# Patient Record
Sex: MALE | Race: WHITE | NOT HISPANIC OR LATINO | Employment: OTHER | URBAN - METROPOLITAN AREA
[De-identification: names, ages, dates, MRNs, and addresses within clinical notes are randomized per-mention and may not be internally consistent; named-entity substitution may affect disease eponyms.]

---

## 2017-02-16 RX ORDER — TESTOSTERONE CYPIONATE 200 MG/ML
INJECTION, SOLUTION INTRAMUSCULAR
Qty: 10 ML | Refills: 0 | OUTPATIENT
Start: 2017-02-16 | End: 2017-06-01 | Stop reason: SDUPTHER

## 2017-03-06 RX ORDER — LEVOTHYROXINE SODIUM 0.2 MG/1
TABLET ORAL
Qty: 90 TABLET | Refills: 0 | Status: SHIPPED | OUTPATIENT
Start: 2017-03-06 | End: 2017-06-01 | Stop reason: SDUPTHER

## 2017-03-14 ENCOUNTER — TELEPHONE (OUTPATIENT)
Dept: ENDOCRINOLOGY | Age: 67
End: 2017-03-14

## 2017-03-14 RX ORDER — DILTIAZEM HYDROCHLORIDE 180 MG/1
180 CAPSULE, COATED, EXTENDED RELEASE ORAL DAILY
Qty: 90 CAPSULE | Refills: 1 | Status: SHIPPED | OUTPATIENT
Start: 2017-03-14 | End: 2017-04-06

## 2017-03-14 RX ORDER — DILTIAZEM HYDROCHLORIDE 180 MG/1
180 CAPSULE, COATED, EXTENDED RELEASE ORAL DAILY
Qty: 90 CAPSULE | Refills: 1 | Status: SHIPPED | OUTPATIENT
Start: 2017-03-14 | End: 2017-03-14 | Stop reason: SDUPTHER

## 2017-03-14 RX ORDER — FENOFIBRIC ACID 135 MG/1
135 CAPSULE, DELAYED RELEASE ORAL DAILY
Qty: 90 CAPSULE | Refills: 1 | Status: SHIPPED | OUTPATIENT
Start: 2017-03-14 | End: 2017-04-06

## 2017-03-14 NOTE — TELEPHONE ENCOUNTER
----- Message from Monica Dale sent at 3/14/2017  9:40 AM EDT -----  Contact: patient  Patient needs a refill of  fenofibric acid (TRILIPIX) 135 MG capsule, one tablet once per day, 90 day supply  diltiaZEM CD (CARDIZEM CD) 180 MG 24 hr capsule, one tablet once per day, 90 day supply  Send to [facility]  [phone] (Phone)  [phone] (Fax)      FENOFIBRIC ACID REFILL SENT TO PHARMACY.  REQUEST FOR DILTIAZEM WAS SENT TO DR. DURHAM

## 2017-03-17 ENCOUNTER — TELEPHONE (OUTPATIENT)
Dept: ENDOCRINOLOGY | Age: 67
End: 2017-03-17

## 2017-03-17 RX ORDER — ATORVASTATIN CALCIUM 40 MG/1
40 TABLET, FILM COATED ORAL DAILY
Qty: 90 TABLET | Refills: 1 | Status: SHIPPED | OUTPATIENT
Start: 2017-03-17 | End: 2017-09-25 | Stop reason: SDUPTHER

## 2017-03-17 NOTE — TELEPHONE ENCOUNTER
----- Message from Monica Dale sent at 3/17/2017 10:11 AM EDT -----  Contact: patient  Patient needs a refill of  atorvastatin (LIPITOR) 40 MG tablet, one tablet once per day, 90 day supply  Send to [facility]   [phone] (Phone)  [phone] (Fax)    SCRIPT SENT

## 2017-03-29 ENCOUNTER — TELEPHONE (OUTPATIENT)
Dept: ENDOCRINOLOGY | Age: 67
End: 2017-03-29

## 2017-03-29 DIAGNOSIS — IMO0002 UNCONTROLLED TYPE 2 DIABETES MELLITUS WITH COMPLICATION, WITHOUT LONG-TERM CURRENT USE OF INSULIN: ICD-10-CM

## 2017-03-29 NOTE — TELEPHONE ENCOUNTER
----- Message from Jennifer Blackburn sent at 3/28/2017  9:41 AM EDT -----  Contact: patient  [facility] [place], [place] - [address] [phone]  - [phone] (Phone)  [phone] (Fax)              VICTOZA 18 MG/3ML solution pen-injector 27 mL 1 9/2/2016      INJECT 1.8MG SUBCUTANEOUSLYDAILY           Script sent

## 2017-04-06 ENCOUNTER — OFFICE VISIT (OUTPATIENT)
Dept: CARDIOLOGY | Facility: CLINIC | Age: 67
End: 2017-04-06

## 2017-04-06 VITALS
BODY MASS INDEX: 29.54 KG/M2 | HEART RATE: 62 BPM | HEIGHT: 71 IN | DIASTOLIC BLOOD PRESSURE: 70 MMHG | SYSTOLIC BLOOD PRESSURE: 120 MMHG | WEIGHT: 211 LBS

## 2017-04-06 DIAGNOSIS — I73.9 PERIPHERAL VASCULAR DISEASE (HCC): ICD-10-CM

## 2017-04-06 DIAGNOSIS — I25.10 CHRONIC CORONARY ARTERY DISEASE: Primary | ICD-10-CM

## 2017-04-06 DIAGNOSIS — E78.5 HYPERLIPIDEMIA, UNSPECIFIED HYPERLIPIDEMIA TYPE: ICD-10-CM

## 2017-04-06 DIAGNOSIS — I10 ESSENTIAL HYPERTENSION: ICD-10-CM

## 2017-04-06 DIAGNOSIS — Z79.4 TYPE 2 DIABETES MELLITUS WITH OTHER CIRCULATORY COMPLICATION, WITH LONG-TERM CURRENT USE OF INSULIN (HCC): ICD-10-CM

## 2017-04-06 DIAGNOSIS — E11.59 TYPE 2 DIABETES MELLITUS WITH OTHER CIRCULATORY COMPLICATION, WITH LONG-TERM CURRENT USE OF INSULIN (HCC): ICD-10-CM

## 2017-04-06 PROCEDURE — 93000 ELECTROCARDIOGRAM COMPLETE: CPT | Performed by: INTERNAL MEDICINE

## 2017-04-06 PROCEDURE — 99214 OFFICE O/P EST MOD 30 MIN: CPT | Performed by: INTERNAL MEDICINE

## 2017-04-06 NOTE — PROGRESS NOTES
Date of Office Visit: 2017  Encounter Provider: Argentina Prieto MD  Place of Service: [facility]  Patient Name: Spencer Sinha  :1950      Patient ID:  Spencer Sinha is a 67 y.o. male is here for  followup for CAD.         History of Present Illness  He was admitted to [facility] on 2011 with syncope and  atrial fibrillation with rapid ventricular response. His atrial fibrillation  was due to thyrotoxicosis. He sees Dr. Brown for this. He converted  to sinus rhythm with medication, was stable and able to be discharged from  the hospital on propylthiouracil.      He had bilateral knee replacements in  with Dr. Mena. Prior to that  he had an abnormal stress Myoview study showing anterior infarct with  periinfarct ischemia. He went on to have a cardiac catheterization showing  severe left anterior descending artery disease, which was well  collateralized and he was treated medically as he did not have any active  angina.      He has a known history of peripheral arterial disease and had aortobifemoral  bypass done by Dr. Solo in . He is schedule to followup with him in one  month.      He has discoid lupus and follows with Dr. Foster.   He stopped smoking in .        He retired in 2014 and he is really enjoying it.       Mr. Sinha is here today for followup.  He has no chest pain or difficulty breathing.  He has had no tachycardia, dizziness or syncope.  His energy level is good.  I do not have a recent lipid panel for him.  He is getting that checked with Dr. Brown so I will get that from his office.  The patient has had no dizziness, heart racing or skipping.  He wonders if there are some medications he can come off of.  He is staying active and he brought his grandson, Hardik, with him today and he enjoys spending a lot of time with him.            Past Medical History:   Diagnosis Date   •  Arteriosclerotic coronary artery disease    • Atrial fibrillation    • Coronary artery disease    • Diabetes     type II, mellitus   • Diplopia    • Discoid lupus erythematosus    • Graves disease    • History of echocardiogram     L ATRIUM ENLARGED, anterior myocardial infarction, lft axis deviation, abnormal ECG   • History of electrocardiogram     SHOWS NORMAL SINUS RHYTHM, ANTERIOR MYOCARDIAL INFARCTION, OLD LDEFT AXIS DEVIATION, LEFT ANTERIOR FASCICULAR BLOCK, ABNORMAL ECG   • Hyperinsulinism    • Hypertension    • Hypertensive heart disease    • Hypogonadism, male    • Hypothyroidism, postablative    • IBS (irritable bowel syndrome)    • Left ventricular hypertrophy    • Long-term insulin use    • Metabolic disorder    • Myocardial infarction of inferolateral wall greater than eight weeks ago     of inferior wall, greater than 8 weeks   • Osteoarthritis    • Peripheral vascular disease    • Right bundle branch block with left anterior fascicular block    • Syncope    • Thyrotoxicosis factitia    • Type 2 diabetes mellitus          Past Surgical History:   Procedure Laterality Date   • BACK SURGERY     • BYPASS GRAFT      USING VEIN: AORTOFEMORAL   • OTHER SURGICAL HISTORY      CATHETER ABLATION   • REPLACEMENT TOTAL KNEE BILATERAL         Current Outpatient Prescriptions on File Prior to Visit   Medication Sig Dispense Refill   • ALPRAZolam (XANAX) 0.5 MG tablet Take 0.5 mg by mouth 2 (Two) Times a Day.     • aspirin 81 MG EC tablet Take 1 tablet by mouth daily.     • atorvastatin (LIPITOR) 40 MG tablet Take 1 tablet by mouth Daily. 90 tablet 1   • CALCIUM PO Take 1 tablet by mouth daily.     • carvedilol (COREG) 6.25 MG tablet TAKE ONE TABLET BY MOUTH TWICE A DAY WITH MEALS 180 tablet 0   • Cholecalciferol (VITAMIN D) 2000 UNITS capsule Take  by mouth.     • clopidogrel (PLAVIX) 75 MG tablet Take 1 tablet by mouth daily.     • diltiaZEM CD (CARDIZEM CD) 180 MG 24 hr capsule Take 1 capsule by mouth Daily. 90  "capsule 1   • Docusate Calcium (STOOL SOFTENER PO) Take 1 tablet by mouth 2 (two) times a day.     • DULoxetine (CYMBALTA) 30 MG capsule Take by mouth.     • DULoxetine (CYMBALTA) 60 MG capsule      • fenofibric acid (TRILIPIX) 135 MG capsule delayed-release delayed release capsule Take 1 capsule by mouth Daily. 90 capsule 1   • glimepiride (AMARYL) 2 MG tablet TAKE ONE TABLET BY MOUTH DAILY AS DIRECTED 90 tablet 1   • glucose blood (KIMBERLY CONTOUR TEST) test strip Kimberly Contour Test In Vitro Strip; Patient Sig: Kimberly Contour Test In Vitro Strip ; 100; 0; 06-May-2012; Active     • Insulin Pen Needle (NOVOFINE) 32G X 6 MM misc      • levothyroxine (SYNTHROID, LEVOTHROID) 200 MCG tablet TAKE ONE TABLET BY MOUTH DAILY 90 tablet 0   • Liraglutide (VICTOZA) 18 MG/3ML solution pen-injector Inject 1.8 mg under the skin Daily. 27 mL 1   • lisinopril-hydrochlorothiazide (PRINZIDE,ZESTORETIC) 20-25 MG per tablet TAKE ONE TABLET BY MOUTH DAILY 90 tablet 3   • Magnesium 100 MG capsule Take  by mouth.     • meloxicam (MOBIC) 15 MG tablet Take by mouth.     • metFORMIN (GLUCOPHAGE) 1000 MG tablet TAKE ONE TABLET BY MOUTH TWICE A  tablet 2   • Multiple Vitamin (MULTI-DAY VITAMINS PO) Take  by mouth daily.     • Syringe/Needle, Disp, (B-D SYRINGE/NEEDLE 3CC/22GX1.5) 22G X 1-1/2\" 3 ML misc      • Testosterone Cypionate (DEPOTESTOTERONE CYPIONATE) 200 MG/ML injection INJECT 0.75MLS INTO THE MUSCLE EVERY 10 DAYS 10 mL 0   • traZODone (DESYREL) 50 MG tablet Take 50 mg by mouth.     • Zinc 100 MG tablet Take 1 tablet by mouth daily.     • [DISCONTINUED] glycopyrrolate (ROBINUL) 1 MG tablet Take 1 mg by mouth 3 (Three) Times a Day.       No current facility-administered medications on file prior to visit.        Social History     Social History   • Marital status:      Spouse name: N/A   • Number of children: N/A   • Years of education: N/A     Occupational History   • Not on file.     Social History Main Topics   • Smoking " "status: Former Smoker   • Smokeless tobacco: Not on file   • Alcohol use No      Comment: caffeine use   • Drug use: Not on file   • Sexual activity: Not on file     Other Topics Concern   • Not on file     Social History Narrative           Review of Systems   Constitution: Negative.   HENT: Negative for congestion and headaches.    Eyes: Negative for vision loss in left eye and vision loss in right eye.   Respiratory: Negative.  Negative for cough, hemoptysis, shortness of breath, sleep disturbances due to breathing, snoring, sputum production and wheezing.    Endocrine: Negative.    Hematologic/Lymphatic: Negative.    Skin: Negative for poor wound healing and rash.   Musculoskeletal: Negative for falls, gout, muscle cramps and myalgias.   Gastrointestinal: Negative for abdominal pain, diarrhea, dysphagia, hematemesis, melena, nausea and vomiting.   Neurological: Negative for excessive daytime sleepiness, dizziness, light-headedness, loss of balance, seizures and vertigo.   Psychiatric/Behavioral: Negative for depression and substance abuse. The patient is not nervous/anxious.        Procedures    ECG 12 Lead  Date/Time: 4/6/2017 1:06 PM  Performed by: SONJA DURHAM  Authorized by: SONJA DURHAM   Comparison: compared with previous ECG   Similar to previous ECG  Rhythm: sinus rhythm  QRS axis: left  Other findings: PRWP  Clinical impression: non-specific ECG               Objective:      Vitals:    04/06/17 1254   BP: 120/70   BP Location: Right arm   Patient Position: Sitting   Pulse: 62   Weight: 211 lb (95.7 kg)   Height: 71\" (180.3 cm)     Body mass index is 29.43 kg/(m^2).    Physical Exam   Constitutional: He is oriented to person, place, and time. He appears well-developed and well-nourished. No distress.   HENT:   Head: Normocephalic and atraumatic.   Eyes: Conjunctivae are normal. No scleral icterus.   Neck: Neck supple. No JVD present. Carotid bruit is not present. No thyromegaly present. "   Cardiovascular: Normal rate, regular rhythm, S1 normal, S2 normal, normal heart sounds and intact distal pulses.   No extrasystoles are present. PMI is not displaced.  Exam reveals no gallop.    No murmur heard.  Pulses:       Carotid pulses are 2+ on the right side, and 2+ on the left side.       Radial pulses are 2+ on the right side, and 2+ on the left side.        Dorsalis pedis pulses are 2+ on the right side, and 2+ on the left side.        Posterior tibial pulses are 2+ on the right side, and 2+ on the left side.   Pulmonary/Chest: Effort normal and breath sounds normal. No respiratory distress. He has no wheezes. He has no rhonchi. He has no rales. He exhibits no tenderness.   Abdominal: Soft. Bowel sounds are normal. He exhibits no distension, no abdominal bruit and no mass. There is no tenderness.   Musculoskeletal: He exhibits no edema or deformity.   Lymphadenopathy:     He has no cervical adenopathy.   Neurological: He is alert and oriented to person, place, and time. No cranial nerve deficit.   Skin: Skin is warm and dry. No rash noted. He is not diaphoretic. No cyanosis. No pallor. Nails show no clubbing.   Psychiatric: He has a normal mood and affect. Judgment normal.   Vitals reviewed.      Lab Review:       Assessment:      Diagnosis Plan   1. Chronic coronary artery disease     2. Hyperlipidemia, unspecified hyperlipidemia type     3. Essential hypertension     4. Peripheral vascular disease     5. Type 2 diabetes mellitus with other circulatory complication, with long-term current use of insulin       1. History of atrial fibrillation due to thyroid toxicosis. No recurrence of atrial fibrillation.  2. Hypertension, under good control. Has history of left ventricular hypertrophy.  3. Hyperlipidemia, per Dr. Brown.   4. Diabetes mellitus type 2, per Dr. Brown. Well controlled.  5. Peripheral arterial disease status post bilateral aorta with bifemoral  bypasses. Follows with   Jarod who he sees next month.  6. Tobacco use. He stopped smoking in 2015.   7. Coronary disease with history of severe left anterior descending artery  stenosis. No active angina or heart failure.   8. Abnormal ECG with left anterior fascicular block.  9. Osteoarthritis.   10. Stress with depression, under better control.      Plan:       Stop fenofibrate and diltiazem.  See back in 1 year, call in bp and hr results in 1 month.    Coronary Artery Disease  Assessment  • The patient has no angina    Subjective - Objective  • Current antiplatelet therapy includes aspirin 81 mg

## 2017-04-13 RX ORDER — LISINOPRIL AND HYDROCHLOROTHIAZIDE 25; 20 MG/1; MG/1
TABLET ORAL
Qty: 90 TABLET | Refills: 2 | Status: CANCELLED | OUTPATIENT
Start: 2017-04-13

## 2017-04-14 RX ORDER — LISINOPRIL AND HYDROCHLOROTHIAZIDE 25; 20 MG/1; MG/1
1 TABLET ORAL DAILY
Qty: 90 TABLET | Refills: 3 | Status: SHIPPED | OUTPATIENT
Start: 2017-04-14 | End: 2018-04-10 | Stop reason: ALTCHOICE

## 2017-04-21 ENCOUNTER — LAB (OUTPATIENT)
Dept: ENDOCRINOLOGY | Age: 67
End: 2017-04-21

## 2017-04-21 DIAGNOSIS — E89.0 POSTABLATIVE HYPOTHYROIDISM: ICD-10-CM

## 2017-04-21 DIAGNOSIS — E29.1 HYPOGONADISM IN MALE: ICD-10-CM

## 2017-04-21 DIAGNOSIS — IMO0002 UNCONTROLLED TYPE 2 DIABETES MELLITUS WITH COMPLICATION, WITH LONG-TERM CURRENT USE OF INSULIN: Primary | ICD-10-CM

## 2017-04-21 DIAGNOSIS — IMO0002 UNCONTROLLED TYPE 2 DIABETES MELLITUS WITH COMPLICATION, WITH LONG-TERM CURRENT USE OF INSULIN: ICD-10-CM

## 2017-04-23 LAB
ALBUMIN SERPL-MCNC: 4.5 G/DL (ref 3.5–5.2)
ALBUMIN/CREAT UR: 4.4 MG/G CREAT (ref 0–30)
ALBUMIN/GLOB SERPL: 1.7 G/DL
ALP SERPL-CCNC: 55 U/L (ref 39–117)
ALT SERPL-CCNC: 43 U/L (ref 1–41)
AST SERPL-CCNC: 26 U/L (ref 1–40)
BILIRUB SERPL-MCNC: 0.4 MG/DL (ref 0.1–1.2)
BUN SERPL-MCNC: 20 MG/DL (ref 8–23)
BUN/CREAT SERPL: 15.7 (ref 7–25)
CALCIUM SERPL-MCNC: 10.4 MG/DL (ref 8.6–10.5)
CHLORIDE SERPL-SCNC: 101 MMOL/L (ref 98–107)
CO2 SERPL-SCNC: 24.7 MMOL/L (ref 22–29)
CONV COMMENT: ABNORMAL
CREAT SERPL-MCNC: 1.27 MG/DL (ref 0.76–1.27)
CREAT UR-MCNC: 129.9 MG/DL
GLOBULIN SER CALC-MCNC: 2.6 GM/DL
GLUCOSE SERPL-MCNC: 170 MG/DL (ref 65–99)
HBA1C MFR BLD: 7.2 % (ref 4.8–5.6)
MICROALBUMIN UR-MCNC: 5.7 UG/ML
POTASSIUM SERPL-SCNC: 4.8 MMOL/L (ref 3.5–5.2)
PROT SERPL-MCNC: 7.1 G/DL (ref 6–8.5)
SHBG SERPL-SCNC: 35.2 NMOL/L (ref 19.3–76.4)
SODIUM SERPL-SCNC: 143 MMOL/L (ref 136–145)
T4 FREE SERPL-MCNC: 1.75 NG/DL (ref 0.93–1.7)
TESTOST FREE SERPL-MCNC: 8.5 PG/ML (ref 6.6–18.1)
TESTOST SERPL-MCNC: 207 NG/DL (ref 348–1197)
TSH SERPL DL<=0.005 MIU/L-ACNC: 1.4 MIU/ML (ref 0.27–4.2)

## 2017-04-28 ENCOUNTER — OFFICE VISIT (OUTPATIENT)
Dept: ENDOCRINOLOGY | Age: 67
End: 2017-04-28

## 2017-04-28 VITALS
SYSTOLIC BLOOD PRESSURE: 110 MMHG | BODY MASS INDEX: 29.09 KG/M2 | HEIGHT: 71 IN | WEIGHT: 207.8 LBS | HEART RATE: 74 BPM | DIASTOLIC BLOOD PRESSURE: 66 MMHG | OXYGEN SATURATION: 98 %

## 2017-04-28 DIAGNOSIS — E16.1 HYPERINSULINISM: ICD-10-CM

## 2017-04-28 DIAGNOSIS — E29.1 HYPOGONADISM IN MALE: ICD-10-CM

## 2017-04-28 DIAGNOSIS — E89.0 POSTABLATIVE HYPOTHYROIDISM: ICD-10-CM

## 2017-04-28 DIAGNOSIS — IMO0002 UNCONTROLLED TYPE 2 DIABETES MELLITUS WITH COMPLICATION, WITHOUT LONG-TERM CURRENT USE OF INSULIN: ICD-10-CM

## 2017-04-28 DIAGNOSIS — E05.00 GRAVES DISEASE: ICD-10-CM

## 2017-04-28 DIAGNOSIS — IMO0002 UNCONTROLLED TYPE 2 DIABETES MELLITUS WITH COMPLICATION, WITH LONG-TERM CURRENT USE OF INSULIN: Primary | ICD-10-CM

## 2017-04-28 PROCEDURE — 99214 OFFICE O/P EST MOD 30 MIN: CPT | Performed by: INTERNAL MEDICINE

## 2017-04-30 LAB
CHOLEST SERPL-MCNC: 149 MG/DL (ref 0–200)
CONV COMMENT: NORMAL
HDLC SERPL-MCNC: 39 MG/DL (ref 40–60)
LDLC SERPL CALC-MCNC: 71 MG/DL (ref 0–100)
TESTOST FREE SERPL-MCNC: 17.3 PG/ML (ref 6.6–18.1)
TESTOST SERPL-MCNC: 707 NG/DL (ref 348–1197)
TRIGL SERPL-MCNC: 195 MG/DL (ref 0–150)
VLDLC SERPL CALC-MCNC: 39 MG/DL (ref 5–40)

## 2017-05-02 ENCOUNTER — TELEPHONE (OUTPATIENT)
Dept: CARDIOLOGY | Facility: CLINIC | Age: 67
End: 2017-05-02

## 2017-05-26 ENCOUNTER — TELEPHONE (OUTPATIENT)
Dept: ENDOCRINOLOGY | Age: 67
End: 2017-05-26

## 2017-06-01 ENCOUNTER — TELEPHONE (OUTPATIENT)
Dept: ENDOCRINOLOGY | Age: 67
End: 2017-06-01

## 2017-06-01 RX ORDER — TESTOSTERONE CYPIONATE 200 MG/ML
INJECTION, SOLUTION INTRAMUSCULAR
Qty: 10 ML | Refills: 0 | OUTPATIENT
Start: 2017-06-01 | End: 2018-03-01 | Stop reason: SDUPTHER

## 2017-06-01 NOTE — TELEPHONE ENCOUNTER
----- Message from Francesca Wilkerson sent at 5/31/2017  3:36 PM EDT -----  Contact: PATIENT  THE PATIENT HAS SENT 2 MESSAGES ABOUT NEEDING HIS TESTOSTERONE REFILLED AND I HAD SPOKEN WITH HIM ABOUT HIS ON 5/24/17. I SEE IN THE CHART WHERE YOU HAD SENT THE PA TO [facility] ON 5/26/17 AND YOU SAID WE ARE JUST WAITING FOR A DECISION FROM THEM. THE PATIENT HEARD THAT EVENING FROM [facility] THAT HIS TESTOSTERONE WOULD BE COVERED AND IS ASKING FOR THIS SCRIPT TO BE SENT TO [facility]; NOT HIS [facility]. PLEASE SEND THIS SCRIPT TO:  [facility] [place], [place] - [address]  [phone] (Phone)  [phone] (Fax)          SCRIPT CALLED IN

## 2017-06-02 RX ORDER — LEVOTHYROXINE SODIUM 0.2 MG/1
TABLET ORAL
Qty: 90 TABLET | Refills: 0 | Status: SHIPPED | OUTPATIENT
Start: 2017-06-02 | End: 2017-07-27 | Stop reason: SDUPTHER

## 2017-06-02 RX ORDER — GLIMEPIRIDE 2 MG/1
TABLET ORAL
Qty: 90 TABLET | Refills: 0 | Status: SHIPPED | OUTPATIENT
Start: 2017-06-02 | End: 2017-08-23 | Stop reason: SDUPTHER

## 2017-06-15 ENCOUNTER — OFFICE (AMBULATORY)
Dept: URBAN - METROPOLITAN AREA CLINIC 75 | Facility: CLINIC | Age: 67
End: 2017-06-15

## 2017-06-15 VITALS
HEART RATE: 68 BPM | WEIGHT: 200 LBS | SYSTOLIC BLOOD PRESSURE: 118 MMHG | HEIGHT: 78 IN | DIASTOLIC BLOOD PRESSURE: 62 MMHG

## 2017-06-15 DIAGNOSIS — K59.1 FUNCTIONAL DIARRHEA: ICD-10-CM

## 2017-06-15 PROCEDURE — 99204 OFFICE O/P NEW MOD 45 MIN: CPT | Performed by: INTERNAL MEDICINE

## 2017-06-15 RX ORDER — DICYCLOMINE HYDROCHLORIDE 10 MG/1
CAPSULE ORAL
Qty: 60 | Refills: 6 | Status: COMPLETED
Start: 2017-06-15 | End: 2017-07-25

## 2017-06-16 ENCOUNTER — OFFICE (AMBULATORY)
Dept: URBAN - METROPOLITAN AREA LAB 2 | Facility: LAB | Age: 67
End: 2017-06-16
Payer: MEDICARE

## 2017-06-16 DIAGNOSIS — K59.1 FUNCTIONAL DIARRHEA: ICD-10-CM

## 2017-06-16 PROCEDURE — 87999 UNLISTED MICROBIOLOGY PX: CPT | Performed by: INTERNAL MEDICINE

## 2017-06-23 RX ORDER — CARVEDILOL 6.25 MG/1
TABLET ORAL
Qty: 180 TABLET | Refills: 3 | Status: SHIPPED | OUTPATIENT
Start: 2017-06-23 | End: 2018-06-23 | Stop reason: SDUPTHER

## 2017-07-13 ENCOUNTER — LAB (OUTPATIENT)
Dept: ENDOCRINOLOGY | Age: 67
End: 2017-07-13

## 2017-07-13 DIAGNOSIS — IMO0002 UNCONTROLLED TYPE 2 DIABETES MELLITUS WITH COMPLICATION, WITH LONG-TERM CURRENT USE OF INSULIN: Primary | ICD-10-CM

## 2017-07-13 DIAGNOSIS — E89.0 POSTABLATIVE HYPOTHYROIDISM: ICD-10-CM

## 2017-07-13 DIAGNOSIS — E29.1 HYPOGONADISM IN MALE: ICD-10-CM

## 2017-07-13 DIAGNOSIS — E78.5 HYPERLIPIDEMIA, UNSPECIFIED HYPERLIPIDEMIA TYPE: ICD-10-CM

## 2017-07-13 DIAGNOSIS — IMO0002 UNCONTROLLED TYPE 2 DIABETES MELLITUS WITH COMPLICATION, WITH LONG-TERM CURRENT USE OF INSULIN: ICD-10-CM

## 2017-07-15 LAB
ALBUMIN SERPL-MCNC: 4.6 G/DL (ref 3.5–5.2)
ALBUMIN/CREAT UR: <4 MG/G CREAT (ref 0–30)
ALBUMIN/GLOB SERPL: 1.5 G/DL
ALP SERPL-CCNC: 69 U/L (ref 39–117)
ALT SERPL-CCNC: 27 U/L (ref 1–41)
AST SERPL-CCNC: 22 U/L (ref 1–40)
BILIRUB SERPL-MCNC: 0.4 MG/DL (ref 0.1–1.2)
BUN SERPL-MCNC: 22 MG/DL (ref 8–23)
BUN/CREAT SERPL: 18.6 (ref 7–25)
CALCIUM SERPL-MCNC: 10.8 MG/DL (ref 8.6–10.5)
CHLORIDE SERPL-SCNC: 95 MMOL/L (ref 98–107)
CHOLEST SERPL-MCNC: 140 MG/DL (ref 0–200)
CO2 SERPL-SCNC: 28.8 MMOL/L (ref 22–29)
CONV COMMENT: ABNORMAL
CREAT SERPL-MCNC: 1.18 MG/DL (ref 0.76–1.27)
CREAT UR-MCNC: 75.4 MG/DL
GLOBULIN SER CALC-MCNC: 3 GM/DL
GLUCOSE SERPL-MCNC: 130 MG/DL (ref 65–99)
HBA1C MFR BLD: 6.6 % (ref 4.8–5.6)
HDLC SERPL-MCNC: 37 MG/DL (ref 40–60)
LDLC SERPL CALC-MCNC: 68 MG/DL (ref 0–100)
MICROALBUMIN UR-MCNC: <3 UG/ML
POTASSIUM SERPL-SCNC: 4.4 MMOL/L (ref 3.5–5.2)
PROT SERPL-MCNC: 7.6 G/DL (ref 6–8.5)
SHBG SERPL-SCNC: 41.3 NMOL/L (ref 19.3–76.4)
SODIUM SERPL-SCNC: 139 MMOL/L (ref 136–145)
T4 FREE SERPL-MCNC: 1.79 NG/DL (ref 0.93–1.7)
TESTOST FREE SERPL-MCNC: 5.5 PG/ML (ref 6.6–18.1)
TESTOST SERPL-MCNC: 130 NG/DL (ref 348–1197)
TRIGL SERPL-MCNC: 173 MG/DL (ref 0–150)
TSH SERPL DL<=0.005 MIU/L-ACNC: 0.92 MIU/ML (ref 0.27–4.2)
VLDLC SERPL CALC-MCNC: 34.6 MG/DL (ref 5–40)

## 2017-07-24 VITALS
RESPIRATION RATE: 17 BRPM | HEART RATE: 70 BPM | TEMPERATURE: 97.3 F | OXYGEN SATURATION: 95 % | DIASTOLIC BLOOD PRESSURE: 64 MMHG | SYSTOLIC BLOOD PRESSURE: 128 MMHG | OXYGEN SATURATION: 96 % | HEIGHT: 71 IN | RESPIRATION RATE: 21 BRPM | SYSTOLIC BLOOD PRESSURE: 101 MMHG | OXYGEN SATURATION: 98 % | SYSTOLIC BLOOD PRESSURE: 103 MMHG | OXYGEN SATURATION: 99 % | RESPIRATION RATE: 14 BRPM | RESPIRATION RATE: 18 BRPM | SYSTOLIC BLOOD PRESSURE: 106 MMHG | HEART RATE: 68 BPM | SYSTOLIC BLOOD PRESSURE: 105 MMHG | SYSTOLIC BLOOD PRESSURE: 109 MMHG | HEART RATE: 69 BPM | SYSTOLIC BLOOD PRESSURE: 111 MMHG | DIASTOLIC BLOOD PRESSURE: 62 MMHG | DIASTOLIC BLOOD PRESSURE: 66 MMHG | DIASTOLIC BLOOD PRESSURE: 88 MMHG | SYSTOLIC BLOOD PRESSURE: 126 MMHG | DIASTOLIC BLOOD PRESSURE: 92 MMHG | HEART RATE: 67 BPM | HEART RATE: 76 BPM | OXYGEN SATURATION: 97 % | HEART RATE: 66 BPM | DIASTOLIC BLOOD PRESSURE: 65 MMHG | HEART RATE: 78 BPM | TEMPERATURE: 97 F | SYSTOLIC BLOOD PRESSURE: 110 MMHG | DIASTOLIC BLOOD PRESSURE: 61 MMHG | SYSTOLIC BLOOD PRESSURE: 129 MMHG | WEIGHT: 200 LBS | DIASTOLIC BLOOD PRESSURE: 76 MMHG | RESPIRATION RATE: 16 BRPM

## 2017-07-25 ENCOUNTER — AMBULATORY SURGICAL CENTER (AMBULATORY)
Dept: URBAN - METROPOLITAN AREA SURGERY 17 | Facility: SURGERY | Age: 67
End: 2017-07-25

## 2017-07-25 ENCOUNTER — OFFICE (AMBULATORY)
Dept: URBAN - METROPOLITAN AREA CLINIC 64 | Facility: CLINIC | Age: 67
End: 2017-07-25

## 2017-07-25 DIAGNOSIS — K59.1 FUNCTIONAL DIARRHEA: ICD-10-CM

## 2017-07-25 DIAGNOSIS — K64.8 OTHER HEMORRHOIDS: ICD-10-CM

## 2017-07-25 LAB
GI HISTOLOGY: A. UNSPECIFIED: (no result)
GI HISTOLOGY: B. UNSPECIFIED: (no result)
GI HISTOLOGY: C. UNSPECIFIED: (no result)
GI HISTOLOGY: D. UNSPECIFIED: (no result)
GI HISTOLOGY: PDF REPORT: (no result)

## 2017-07-25 PROCEDURE — 45380 COLONOSCOPY AND BIOPSY: CPT | Performed by: INTERNAL MEDICINE

## 2017-07-25 PROCEDURE — 88305 TISSUE EXAM BY PATHOLOGIST: CPT | Performed by: INTERNAL MEDICINE

## 2017-07-25 RX ADMIN — PROPOFOL 50 MG: 10 INJECTION, EMULSION INTRAVENOUS at 09:30

## 2017-07-25 RX ADMIN — PROPOFOL 50 MG: 10 INJECTION, EMULSION INTRAVENOUS at 09:34

## 2017-07-25 RX ADMIN — PROPOFOL 50 MG: 10 INJECTION, EMULSION INTRAVENOUS at 09:24

## 2017-07-25 RX ADMIN — PROPOFOL 100 MG: 10 INJECTION, EMULSION INTRAVENOUS at 09:20

## 2017-07-27 ENCOUNTER — OFFICE VISIT (OUTPATIENT)
Dept: ENDOCRINOLOGY | Age: 67
End: 2017-07-27

## 2017-07-27 VITALS
SYSTOLIC BLOOD PRESSURE: 112 MMHG | OXYGEN SATURATION: 95 % | DIASTOLIC BLOOD PRESSURE: 72 MMHG | HEART RATE: 79 BPM | WEIGHT: 197.4 LBS | HEIGHT: 71 IN | BODY MASS INDEX: 27.64 KG/M2

## 2017-07-27 DIAGNOSIS — IMO0002 UNCONTROLLED TYPE 2 DIABETES MELLITUS WITH COMPLICATION, WITHOUT LONG-TERM CURRENT USE OF INSULIN: Primary | ICD-10-CM

## 2017-07-27 DIAGNOSIS — E29.1 HYPOGONADISM IN MALE: ICD-10-CM

## 2017-07-27 DIAGNOSIS — E05.00 GRAVES DISEASE: ICD-10-CM

## 2017-07-27 DIAGNOSIS — E16.1 HYPERINSULINISM: ICD-10-CM

## 2017-07-27 DIAGNOSIS — E89.0 POSTABLATIVE HYPOTHYROIDISM: ICD-10-CM

## 2017-07-27 PROCEDURE — 99214 OFFICE O/P EST MOD 30 MIN: CPT | Performed by: INTERNAL MEDICINE

## 2017-07-27 RX ORDER — METHSCOPOLAMINE BROMIDE 5 MG/1
TABLET ORAL
COMMUNITY
Start: 2017-07-21 | End: 2018-03-30

## 2017-07-27 RX ORDER — LEVOTHYROXINE SODIUM 175 UG/1
175 TABLET ORAL DAILY
Qty: 90 TABLET | Refills: 1 | Status: SHIPPED | OUTPATIENT
Start: 2017-07-27 | End: 2018-01-25 | Stop reason: SDUPTHER

## 2017-08-24 RX ORDER — GLIMEPIRIDE 2 MG/1
TABLET ORAL
Qty: 90 TABLET | Refills: 1 | Status: SHIPPED | OUTPATIENT
Start: 2017-08-24 | End: 2018-03-01 | Stop reason: SDUPTHER

## 2017-09-25 RX ORDER — ATORVASTATIN CALCIUM 40 MG/1
40 TABLET, FILM COATED ORAL DAILY
Qty: 90 TABLET | Refills: 1 | Status: SHIPPED | OUTPATIENT
Start: 2017-09-25 | End: 2018-03-27 | Stop reason: SDUPTHER

## 2017-09-26 ENCOUNTER — TELEPHONE (OUTPATIENT)
Dept: CARDIOLOGY | Facility: CLINIC | Age: 67
End: 2017-09-26

## 2017-09-26 NOTE — TELEPHONE ENCOUNTER
----- Message from Spencer Sinha sent at 9/26/2017  9:21 AM EDT -----  Regarding: Prescription Question  Contact: [phone]  Background:   In last 10 weeks I have changed diet to Lactose free & lost 20 pounds.  I am light headed when I rise from lying down or sitting.  I have passed out 5 times since July (out for a few seconds). No other symptoms.  BP has been running lower. Range now 106/64. 109/63 at DrMark office yesterday.  RX today: 1 Carvedilol (Coreg) AM & PM                  1 Lisinopril HCTZ (Zestoric) AM  Should I cut back on anything & monitor?

## 2017-10-12 ENCOUNTER — OFFICE (AMBULATORY)
Dept: URBAN - METROPOLITAN AREA CLINIC 75 | Facility: CLINIC | Age: 67
End: 2017-10-12

## 2017-10-12 VITALS
DIASTOLIC BLOOD PRESSURE: 68 MMHG | HEART RATE: 80 BPM | SYSTOLIC BLOOD PRESSURE: 102 MMHG | WEIGHT: 194 LBS | HEIGHT: 71 IN

## 2017-10-12 DIAGNOSIS — K30 FUNCTIONAL DYSPEPSIA: ICD-10-CM

## 2017-10-12 DIAGNOSIS — K59.1 FUNCTIONAL DIARRHEA: ICD-10-CM

## 2017-10-12 DIAGNOSIS — K64.8 OTHER HEMORRHOIDS: ICD-10-CM

## 2017-10-12 DIAGNOSIS — R14.0 ABDOMINAL DISTENSION (GASEOUS): ICD-10-CM

## 2017-10-12 PROCEDURE — 99214 OFFICE O/P EST MOD 30 MIN: CPT | Performed by: INTERNAL MEDICINE

## 2017-10-17 DIAGNOSIS — IMO0002 UNCONTROLLED TYPE 2 DIABETES MELLITUS WITH COMPLICATION, WITHOUT LONG-TERM CURRENT USE OF INSULIN: ICD-10-CM

## 2017-10-17 RX ORDER — LIRAGLUTIDE 6 MG/ML
INJECTION SUBCUTANEOUS
Qty: 27 ML | Refills: 1 | Status: SHIPPED | OUTPATIENT
Start: 2017-10-17 | End: 2018-01-22 | Stop reason: SDUPTHER

## 2017-10-17 RX ORDER — ATORVASTATIN CALCIUM 40 MG/1
TABLET, FILM COATED ORAL
Qty: 90 TABLET | Refills: 1 | Status: SHIPPED | OUTPATIENT
Start: 2017-10-17 | End: 2018-03-30 | Stop reason: SDUPTHER

## 2017-11-10 DIAGNOSIS — E89.0 POSTABLATIVE HYPOTHYROIDISM: Primary | ICD-10-CM

## 2017-11-30 ENCOUNTER — OFFICE VISIT (OUTPATIENT)
Dept: ENDOCRINOLOGY | Age: 67
End: 2017-11-30

## 2017-11-30 DIAGNOSIS — E89.0 POSTABLATIVE HYPOTHYROIDISM: ICD-10-CM

## 2017-11-30 PROCEDURE — 76536 US EXAM OF HEAD AND NECK: CPT | Performed by: INTERNAL MEDICINE

## 2017-12-09 NOTE — PROGRESS NOTES
[facility]                                                                                                                                       Ultrasound Thyroid/ Soft tissue Neck Report    [id number]  Spencer LUIS Sinha    1950    [facility] MD:      11/30/2017    INDICATION: Nodular goiter, Graves' disease status post radioiodine ablation    Explanation of Procedure::    Using physician - performed, real-time ultrasound, limited to the thyroid gland and the anterior neck , longitudinal and transverse images were obtained of both lobes and isthmus and power doppler was used to assess the vascularity and the findings are as follows:    Comparison to previous study  No previous ultrasound available for comparison    Thyroid size and Measurements: All measurements are expressed as longitudinal X AP X  transverse unless otherwise specified    Right Lobe: Measures 4.94 cm x 1.79 cm x 2.39 cm    Left lobe: Measures 3.57 cm x 1.5 cm x 1.37 cm    Isthmus: Measures 0.17 cm    Thyroid Parenchyma:    Overall thyroid gland echotexture appears to be predominantly isoechoic with heterogeneous appearance and multiple nodules spread throughout the gland.  The nodule in the right lobe which appears to be 2.1 cm x 1.6 cm predominantly isoechoic with no calcifications noted  test again to sick features noted for this nodule.  Vascularity appears to be minimal  There is also a nodule in the left lobe of 1.89 cm x 1.05 cm which is rather hypoechoic with a diminished vascularity  Patient also has several small nodules bilaterally  General vascularity of the entire gland appears to be power Doppler grade 1        Impression  Multinodular goiter with heterogeneous echotexture suggestive of thyroiditis  Large right thyroid nodule measuring 2.1 cm x 1.6 cm predominantly isoechoic with no internal characteristics features  Nodule in the left lobe of 1.89 cm x 1.05 cm  predominantly hypoechoic with a diminished vascularity and no other characteristic features  Several small nodules noted throughout the gland  Consistent with multinodular goiter along with the picture of thyroiditis.    Recommendation / Followup  Follow-up ultrasound in 4-6 months to evaluate nodule stability

## 2018-01-15 ENCOUNTER — LAB (OUTPATIENT)
Dept: ENDOCRINOLOGY | Age: 68
End: 2018-01-15

## 2018-01-15 DIAGNOSIS — E89.0 POSTABLATIVE HYPOTHYROIDISM: Primary | ICD-10-CM

## 2018-01-15 DIAGNOSIS — E89.0 POSTABLATIVE HYPOTHYROIDISM: ICD-10-CM

## 2018-01-15 DIAGNOSIS — E78.5 HYPERLIPIDEMIA, UNSPECIFIED HYPERLIPIDEMIA TYPE: ICD-10-CM

## 2018-01-15 DIAGNOSIS — IMO0002 UNCONTROLLED TYPE 2 DIABETES MELLITUS WITH COMPLICATION, WITHOUT LONG-TERM CURRENT USE OF INSULIN: ICD-10-CM

## 2018-01-15 DIAGNOSIS — E29.1 HYPOGONADISM IN MALE: ICD-10-CM

## 2018-01-18 LAB
ALBUMIN SERPL-MCNC: 4.7 G/DL (ref 3.5–5.2)
ALBUMIN/CREAT UR: 9.9 MG/G CREAT (ref 0–30)
ALBUMIN/GLOB SERPL: 1.8 G/DL
ALP SERPL-CCNC: 66 U/L (ref 39–117)
ALT SERPL-CCNC: 37 U/L (ref 1–41)
AST SERPL-CCNC: 46 U/L (ref 1–40)
BILIRUB SERPL-MCNC: 0.4 MG/DL (ref 0.1–1.2)
BUN SERPL-MCNC: 19 MG/DL (ref 8–23)
BUN/CREAT SERPL: 17.1 (ref 7–25)
CALCIUM SERPL-MCNC: 9.7 MG/DL (ref 8.6–10.5)
CHLORIDE SERPL-SCNC: 103 MMOL/L (ref 98–107)
CHOLEST SERPL-MCNC: 118 MG/DL (ref 0–200)
CO2 SERPL-SCNC: 28 MMOL/L (ref 22–29)
CREAT SERPL-MCNC: 1.11 MG/DL (ref 0.76–1.27)
CREAT UR-MCNC: 148.6 MG/DL
GLOBULIN SER CALC-MCNC: 2.6 GM/DL
GLUCOSE SERPL-MCNC: 164 MG/DL (ref 65–99)
HBA1C MFR BLD: 7.03 % (ref 4.8–5.6)
HDLC SERPL-MCNC: 42 MG/DL (ref 40–60)
INTERPRETATION: NORMAL
LDLC SERPL CALC-MCNC: 58 MG/DL (ref 0–100)
Lab: NORMAL
MICROALBUMIN UR-MCNC: 14.7 UG/ML
POTASSIUM SERPL-SCNC: 4.5 MMOL/L (ref 3.5–5.2)
PROT SERPL-MCNC: 7.3 G/DL (ref 6–8.5)
SHBG SERPL-SCNC: 36.5 NMOL/L (ref 19.3–76.4)
SODIUM SERPL-SCNC: 140 MMOL/L (ref 136–145)
T4 FREE SERPL-MCNC: 1.76 NG/DL (ref 0.93–1.7)
TESTOST FREE SERPL-MCNC: 26.7 PG/ML (ref 6.6–18.1)
TESTOST SERPL-MCNC: 1213 NG/DL (ref 264–916)
TRIGL SERPL-MCNC: 88 MG/DL (ref 0–150)
TSH SERPL DL<=0.005 MIU/L-ACNC: 1.89 MIU/ML (ref 0.27–4.2)
VLDLC SERPL CALC-MCNC: 17.6 MG/DL (ref 5–40)

## 2018-01-22 DIAGNOSIS — IMO0002 UNCONTROLLED TYPE 2 DIABETES MELLITUS WITH COMPLICATION, WITHOUT LONG-TERM CURRENT USE OF INSULIN: ICD-10-CM

## 2018-01-24 ENCOUNTER — DOCUMENTATION (OUTPATIENT)
Dept: ENDOCRINOLOGY | Age: 68
End: 2018-01-24

## 2018-01-24 DIAGNOSIS — E04.2 MULTINODULAR GOITER: Primary | ICD-10-CM

## 2018-01-24 NOTE — PROGRESS NOTES
Patient recently had a thyroid ultrasound through Dr. Brown.  Which showed 2 thyroid nodules though the characteristic features of the nodules were not concerning for malignancy based on the size of the nodule itself patient would qualify for a biopsy.  When these results were related to the patient he became very anxious and didn't want to wait until Dr. Brown returned from vacation to perform the biopsy or make a decision if he would need a biopsy or not.  At this point based on the size of the nodule and given the concern that the patient has been experiencing Will refer the patient to the hospital for thyroid nodule biopsy.

## 2018-01-25 DIAGNOSIS — E04.2 MULTINODULAR GOITER: Primary | ICD-10-CM

## 2018-01-26 RX ORDER — LEVOTHYROXINE SODIUM 175 UG/1
TABLET ORAL
Qty: 90 TABLET | Refills: 0 | Status: SHIPPED | OUTPATIENT
Start: 2018-01-26 | End: 2018-05-06 | Stop reason: SDUPTHER

## 2018-01-30 DIAGNOSIS — E04.2 MULTINODULAR GOITER: Primary | ICD-10-CM

## 2018-02-02 ENCOUNTER — TRANSCRIBE ORDERS (OUTPATIENT)
Dept: ADMINISTRATIVE | Facility: HOSPITAL | Age: 68
End: 2018-02-02

## 2018-02-02 DIAGNOSIS — E04.2 MULTINODULAR GOITER: Primary | ICD-10-CM

## 2018-02-06 DIAGNOSIS — E04.2 MULTINODULAR GOITER: Primary | ICD-10-CM

## 2018-02-07 ENCOUNTER — HOSPITAL ENCOUNTER (OUTPATIENT)
Dept: ULTRASOUND IMAGING | Facility: HOSPITAL | Age: 68
Discharge: HOME | End: 2018-02-07
Attending: INTERNAL MEDICINE | Admitting: INTERNAL MEDICINE

## 2018-02-07 VITALS
RESPIRATION RATE: 16 BRPM | HEART RATE: 74 BPM | TEMPERATURE: 94 F | SYSTOLIC BLOOD PRESSURE: 122 MMHG | BODY MASS INDEX: 28 KG/M2 | WEIGHT: 200 LBS | HEIGHT: 71 IN | DIASTOLIC BLOOD PRESSURE: 73 MMHG | OXYGEN SATURATION: 98 %

## 2018-02-07 DIAGNOSIS — E04.2 MULTINODULAR GOITER: ICD-10-CM

## 2018-02-07 LAB
INR PPP: 1.1 (ref 0.8–1.2)
PROTHROMBIN TIME: 12.8 SECONDS (ref 12.8–15.2)

## 2018-02-07 PROCEDURE — 88173 CYTOPATH EVAL FNA REPORT: CPT | Performed by: INTERNAL MEDICINE

## 2018-02-07 PROCEDURE — 88305 TISSUE EXAM BY PATHOLOGIST: CPT | Performed by: INTERNAL MEDICINE

## 2018-02-07 PROCEDURE — 76942 ECHO GUIDE FOR BIOPSY: CPT

## 2018-02-07 RX ORDER — LIDOCAINE HYDROCHLORIDE 10 MG/ML
10 INJECTION, SOLUTION INFILTRATION; PERINEURAL ONCE
Status: COMPLETED | OUTPATIENT
Start: 2018-02-07 | End: 2018-02-07

## 2018-02-07 RX ADMIN — LIDOCAINE HYDROCHLORIDE 10 ML: 10 INJECTION, SOLUTION INFILTRATION; PERINEURAL at 08:27

## 2018-02-07 NOTE — DISCHARGE INSTRUCTIONS
EDUCATION / DISCHARGE INSTRUCTIONS  Aspiration:  An aspiration is a procedure used to take a sample of cells or tissue from a lump, mass or other area of concern.   Within a week the radiologist will send a report to your physician.  A pathologist will also examine the tissue and send a report.  THIS EDUCATION INFORMATION WAS REVIEWED PRIOR TO THE PROCEDURE AND CONSENT.  Patient initials_________________Time________________      Post Procedure:    *  Rest today (no pushing pulling or straining).   *  Slowly increase activity tomorow.    *  If you received sedation do not drive for 24 hours.   *  Keep dressing clean and dry.   *  Leave dressing on puncture site for 24 hours.    *  You may shower when dressing removed.   *  If needed, use an ice pack at the site for up to 20 minutes at a time for pain.    Call your doctor if experiencing:   *  Signs of infection such as redness, swelling, excessive pain and / or foul      smelling drainage from the puncture site.   *  Chills or fever over 101 degrees (by mouth).   *  Unrelieved pain.   *  Any new or severe symptoms.      Following the procedure:     Follow-up with the ordering physician as directed.    Continue to take other medications as directed by your physician unless  otherwise instructed.   If applicable, resume taking your blood thinners or Aspirin on ___________.     If you have any concerns please call the Radiology Nurses Desk at [phone].  You are the most important factor in your recovery.  Follow the above instructions carefully.  EDUCATION /DISCHARGE INSTRUCTIONS  CT/US guided biopsy:  A biopsy is a procedure done to remove tissue for further analysis.  Before images are taken to locate the target area.  Images can be obtained using ultrasound, CT or MRI.  A physician will clean your skin with antiseptic soap, place a sterile towel around the site and administer a local anesthetic to numb the area.  The physician will then insert a special needle.   Sometimes images are taken of the needle after it is inserted to ensure the needle is in the correct area to be biopsied.   A sample is obtained and sent to the laboratory for study.  Occasionally the laboratory is unable to make a diagnosis from the sample and the procedure may need to be repeated.  Within a week the radiologist will send a report to your physician.  A pathologist will also examine the tissue and send a report.      Risks of the procedure include but are not limited to:   *  Bleeding    *  Infection   *  Puncture of surrounding organs *  Death     *  Lung collapse if the biopsy is near the chest which may require insertion of a       tube to re-inflate the lung if severe.      Benefits of the procedure:  Using x-ray helps to locate the area that requires a biopsy. The procedure is less invasive than a surgical procedure, there are no large incisions and it does not require anesthesia.      Alternatives to the procedure:  A biopsy can be performed surgically.  Risks of a surgical biopsy include exposure to anesthesia, infection, excessive bleeding and injury to abdominal organs.  A benefit of surgical biopsy is the ability to see the area to be biopsied and remove of a larger piece of tissue.    THIS EDUCATION INFORMATION WAS REVIEWED PRIOR TO PROCEDURE AND CONSENT. Patient initials__________________Time___________________    Post Procedure:    *  Expect the biopsy site may be tender up to one week.    *  Rest today (no pushing pulling or straining).   *  Slowly increase activity tomorrow.    *  If you received sedation do not drive for 24 hours.   *  Keep dressing clean and dry.   *  Leave dressing on puncture site for 24 hours.    *  You may shower when dressing removed.    Call your doctor if experiencing:   *  Signs of infection such as redness, swelling, excessive pain and / or foul        smelling drainage from the puncture site.   *  Chills or fever over 101 degrees (by mouth).   *   Unrelieved pain.   *  Any new or severe symptoms.   *  If experiencing sudden / severe shortness of breath or chest pain go to the       nearest emergency room.     Following the procedure:     Follow-up with the ordering physician as directed.    Continue to take other medications as directed by your physician unless    otherwise instructed.   If applicable, resume taking your Plavix and Aspirin on Thursday February 8, 2018    If you have any concerns please call the Radiology Nurses Desk at [phone].  You are the most important factor in your recovery.  Follow the above instructions carefully.

## 2018-02-07 NOTE — NURSING NOTE
Discharge instructions discussed and understood by patient and caregiver. No dysphagia.  Drsg to site dry and intact. No distress.

## 2018-02-07 NOTE — NURSING NOTE
Dressing to bandaid sites on lower right neck and lower left neck dry and intact. No c/o pain. Ice pack applied to site. Fluids offered.

## 2018-02-07 NOTE — INTERVAL H&P NOTE
Name: Spencer Sinha ADMIT: 2018   : 1950  PCP: Hardik Rizvi MD    MRN: [id number] LOS: 0 days   AGE/SEX: 67 y.o. male  ROOM: Room/bed info not found       Chief complaint bilateral thyroid nodules for B/L FNA and possible core biopsy    Present Illness or Internal History:  Patient is a 67 y.o. male presents with bilateral thyroid nodules for B/L FNA and possible core biopsy.     Past Surgical History:  Past Surgical History:   Procedure Laterality Date   • ABDOMINAL AORTA STENT Bilateral     USING VEIN: AORTOFEMORAL   • BACK SURGERY     • COLONOSCOPY     • OTHER SURGICAL HISTORY      CATHETER ABLATION   • REPLACEMENT TOTAL KNEE BILATERAL         Past Medical History:  Past Medical History:   Diagnosis Date   • Arteriosclerotic coronary artery disease    • Atrial fibrillation    • Coronary artery disease    • Diabetes     type II, mellitus   • Diplopia    • Discoid lupus erythematosus    • Graves disease    • History of echocardiogram     L ATRIUM ENLARGED, anterior myocardial infarction, lft axis deviation, abnormal ECG   • History of electrocardiogram     SHOWS NORMAL SINUS RHYTHM, ANTERIOR MYOCARDIAL INFARCTION, OLD LDEFT AXIS DEVIATION, LEFT ANTERIOR FASCICULAR BLOCK, ABNORMAL ECG   • Hyperinsulinism    • Hypertension    • Hypertensive heart disease    • Hypogonadism, male    • Hypothyroidism, postablative    • IBS (irritable bowel syndrome)    • Left ventricular hypertrophy    • Long-term insulin use    • Metabolic disorder    • Myocardial infarction of inferolateral wall greater than eight weeks ago     of inferior wall, greater than 8 weeks   • Osteoarthritis    • Peripheral vascular disease    • Right bundle branch block with left anterior fascicular block    • Syncope    • Thyrotoxicosis factitia    • Type 2 diabetes mellitus        Home Medications:    (Not in a hospital admission)    Allergies:  Review of patient's allergies indicates no known allergies.    Family  History:  Family History   Problem Relation Age of Onset   • Coronary artery disease Other        Social History:  Social History   Substance Use Topics   • Smoking status: Former Smoker   • Smokeless tobacco: None   • Alcohol use No      Comment: caffeine use        Objective     Physical Exam:    General Appearance alert, appears stated age and cooperative  Head normocephalic, without obvious abnormality and atraumatic  Lungs clear to auscultation, respirations regular, respirations even and respirations unlabored  Heart regular rhythm & normal rate    Vital Signs  Temp:  [94 °F (34.4 °C)] 94 °F (34.4 °C)  Heart Rate:  [74] 74  Resp:  [16] 16  BP: (122)/(73) 122/73    Anticipated Surgical Procedure:  bilateral thyroid nodules for B/L FNA and possible core biopsy    The risks, benefits and alternatives of this procedure have been discussed with the patient or responsible party: Yes        Dustin Davila MD  02/07/18  7:29 AM

## 2018-02-08 ENCOUNTER — TELEPHONE (OUTPATIENT)
Dept: INTERVENTIONAL RADIOLOGY/VASCULAR | Facility: HOSPITAL | Age: 68
End: 2018-02-08

## 2018-02-08 LAB
LAB AP CASE REPORT: NORMAL
LAB AP DIAGNOSIS COMMENT: NORMAL
LAB AP NON-GYN SPECIMEN ADEQUACY: NORMAL
Lab: NORMAL
PATH REPORT.FINAL DX SPEC: NORMAL
PATH REPORT.GROSS SPEC: NORMAL

## 2018-03-05 RX ORDER — TESTOSTERONE CYPIONATE 200 MG/ML
INJECTION, SOLUTION INTRAMUSCULAR
Qty: 5 ML | Refills: 0 | OUTPATIENT
Start: 2018-03-05 | End: 2018-09-11 | Stop reason: SDUPTHER

## 2018-03-05 RX ORDER — GLIMEPIRIDE 2 MG/1
TABLET ORAL
Qty: 90 TABLET | Refills: 0 | Status: SHIPPED | OUTPATIENT
Start: 2018-03-05 | End: 2018-05-27 | Stop reason: SDUPTHER

## 2018-03-07 DIAGNOSIS — E89.0 POSTABLATIVE HYPOTHYROIDISM: Primary | ICD-10-CM

## 2018-03-23 ENCOUNTER — LAB (OUTPATIENT)
Dept: ENDOCRINOLOGY | Age: 68
End: 2018-03-23

## 2018-03-23 DIAGNOSIS — E29.1 HYPOGONADISM IN MALE: ICD-10-CM

## 2018-03-23 DIAGNOSIS — IMO0002 UNCONTROLLED TYPE 2 DIABETES MELLITUS WITH COMPLICATION, WITH LONG-TERM CURRENT USE OF INSULIN: ICD-10-CM

## 2018-03-23 DIAGNOSIS — E78.5 HYPERLIPIDEMIA, UNSPECIFIED HYPERLIPIDEMIA TYPE: ICD-10-CM

## 2018-03-23 DIAGNOSIS — E89.0 POSTABLATIVE HYPOTHYROIDISM: Primary | ICD-10-CM

## 2018-03-23 DIAGNOSIS — E89.0 POSTABLATIVE HYPOTHYROIDISM: ICD-10-CM

## 2018-03-25 LAB
ALBUMIN SERPL-MCNC: 4.4 G/DL (ref 3.5–5.2)
ALBUMIN/CREAT UR: 19.3 MG/G CREAT (ref 0–30)
ALBUMIN/GLOB SERPL: 1.6 G/DL
ALP SERPL-CCNC: 73 U/L (ref 39–117)
ALT SERPL-CCNC: 48 U/L (ref 1–41)
AST SERPL-CCNC: 29 U/L (ref 1–40)
BILIRUB SERPL-MCNC: 0.6 MG/DL (ref 0.1–1.2)
BUN SERPL-MCNC: 14 MG/DL (ref 8–23)
BUN/CREAT SERPL: 13.5 (ref 7–25)
CALCIUM SERPL-MCNC: 9.6 MG/DL (ref 8.6–10.5)
CHLORIDE SERPL-SCNC: 101 MMOL/L (ref 98–107)
CO2 SERPL-SCNC: 28.6 MMOL/L (ref 22–29)
CREAT SERPL-MCNC: 1.04 MG/DL (ref 0.76–1.27)
CREAT UR-MCNC: 120.9 MG/DL
GFR SERPLBLD CREATININE-BSD FMLA CKD-EPI: 71 ML/MIN/1.73
GFR SERPLBLD CREATININE-BSD FMLA CKD-EPI: 86 ML/MIN/1.73
GLOBULIN SER CALC-MCNC: 2.7 GM/DL
GLUCOSE SERPL-MCNC: 141 MG/DL (ref 65–99)
HBA1C MFR BLD: 7.2 % (ref 4.8–5.6)
MICROALBUMIN UR-MCNC: 23.3 UG/ML
POTASSIUM SERPL-SCNC: 4.9 MMOL/L (ref 3.5–5.2)
PROT SERPL-MCNC: 7.1 G/DL (ref 6–8.5)
SHBG SERPL-SCNC: 34.7 NMOL/L (ref 19.3–76.4)
SODIUM SERPL-SCNC: 142 MMOL/L (ref 136–145)
T4 FREE SERPL-MCNC: 1.37 NG/DL (ref 0.93–1.7)
TESTOST FREE SERPL-MCNC: 6.1 PG/ML (ref 6.6–18.1)
TESTOST SERPL-MCNC: 173 NG/DL (ref 264–916)
TSH SERPL DL<=0.005 MIU/L-ACNC: 5.3 MIU/ML (ref 0.27–4.2)

## 2018-03-27 RX ORDER — ATORVASTATIN CALCIUM 40 MG/1
40 TABLET, FILM COATED ORAL DAILY
Qty: 90 TABLET | Refills: 2 | Status: SHIPPED | OUTPATIENT
Start: 2018-03-27 | End: 2018-09-04 | Stop reason: SDUPTHER

## 2018-03-30 ENCOUNTER — OFFICE VISIT (OUTPATIENT)
Dept: ENDOCRINOLOGY | Age: 68
End: 2018-03-30

## 2018-03-30 VITALS
HEIGHT: 71 IN | SYSTOLIC BLOOD PRESSURE: 124 MMHG | HEART RATE: 84 BPM | WEIGHT: 200.2 LBS | DIASTOLIC BLOOD PRESSURE: 78 MMHG | BODY MASS INDEX: 28.03 KG/M2

## 2018-03-30 DIAGNOSIS — E89.0 POSTABLATIVE HYPOTHYROIDISM: ICD-10-CM

## 2018-03-30 DIAGNOSIS — E05.00 GRAVES DISEASE: ICD-10-CM

## 2018-03-30 DIAGNOSIS — E29.1 HYPOGONADISM IN MALE: ICD-10-CM

## 2018-03-30 DIAGNOSIS — IMO0002 UNCONTROLLED TYPE 2 DIABETES MELLITUS WITH COMPLICATION, WITH LONG-TERM CURRENT USE OF INSULIN: Primary | ICD-10-CM

## 2018-03-30 DIAGNOSIS — E16.1 HYPERINSULINISM: ICD-10-CM

## 2018-03-30 PROCEDURE — 99214 OFFICE O/P EST MOD 30 MIN: CPT | Performed by: INTERNAL MEDICINE

## 2018-03-30 RX ORDER — CHOLECALCIFEROL (VITAMIN D3) 125 MCG
1 CAPSULE ORAL DAILY
COMMUNITY

## 2018-04-10 ENCOUNTER — OFFICE VISIT (OUTPATIENT)
Dept: CARDIOLOGY | Facility: CLINIC | Age: 68
End: 2018-04-10

## 2018-04-10 VITALS
HEIGHT: 71 IN | BODY MASS INDEX: 28.36 KG/M2 | HEART RATE: 68 BPM | DIASTOLIC BLOOD PRESSURE: 86 MMHG | WEIGHT: 202.6 LBS | SYSTOLIC BLOOD PRESSURE: 122 MMHG

## 2018-04-10 DIAGNOSIS — I10 ESSENTIAL HYPERTENSION: ICD-10-CM

## 2018-04-10 DIAGNOSIS — I73.9 PERIPHERAL VASCULAR DISEASE (HCC): ICD-10-CM

## 2018-04-10 DIAGNOSIS — E78.5 HYPERLIPIDEMIA, UNSPECIFIED HYPERLIPIDEMIA TYPE: ICD-10-CM

## 2018-04-10 DIAGNOSIS — I25.10 CHRONIC CORONARY ARTERY DISEASE: Primary | ICD-10-CM

## 2018-04-10 DIAGNOSIS — IMO0002 UNCONTROLLED TYPE 2 DIABETES MELLITUS WITH COMPLICATION, WITH LONG-TERM CURRENT USE OF INSULIN: ICD-10-CM

## 2018-04-10 PROCEDURE — 99214 OFFICE O/P EST MOD 30 MIN: CPT | Performed by: INTERNAL MEDICINE

## 2018-04-10 PROCEDURE — 93000 ELECTROCARDIOGRAM COMPLETE: CPT | Performed by: INTERNAL MEDICINE

## 2018-04-10 RX ORDER — LISINOPRIL 5 MG/1
5 TABLET ORAL DAILY
Qty: 90 TABLET | Refills: 3 | Status: SHIPPED | OUTPATIENT
Start: 2018-04-10 | End: 2019-02-13 | Stop reason: HOSPADM

## 2018-04-10 NOTE — PROGRESS NOTES
Date of Office Visit: 04/10/2018  Encounter Provider: Argentina Prieto MD  Place of Service: [facility] CARDIOLOGY  Patient Name: Spencer Sinha  :1950      Patient ID:  Spencer Sinha is a 68 y.o. male is here for  followup for CAD.         History of Present Illness    He was admitted to [facility] on 2011 with syncope and  atrial fibrillation with rapid ventricular response. His atrial fibrillation  was due to thyrotoxicosis. He sees Dr. Brown for this. He converted  to sinus rhythm with medication, was stable and able to be discharged from  the hospital on propylthiouracil.      He had bilateral knee replacements in  with Dr. Mena. Prior to that  he had an abnormal stress Myoview study showing anterior infarct with  periinfarct ischemia. He went on to have a cardiac catheterization showing  severe left anterior descending artery disease, which was well  collateralized and he was treated medically as he did not have any active  angina.      He has a known history of peripheral arterial disease and had aortobifemoral  bypass done by Dr. Solo in . He follows with Dr. Olivera.       He has discoid lupus and follows with Dr. Foster.   He stopped smoking in .         He retired in 2014 and he is really enjoying it.     He is overall doing well.  He followed up last month with Dr. Olivera and his peripheral vascular disease was stable.  He's had no tachycardia or syncope.  He was having low blood pressure with dizziness so his lisinopril HCT was discontinued.  He is had no further issues since then.    he does have thyroid nodules which are benign, biopsy done 2018.  He had cataract removals in 2019.  He had a normal colonoscopy done in 2017.  He's had no exertional chest tightness or pressure.  He has no orthopnea, exertional dyspnea or PND.       Past Medical History:   Diagnosis Date   • Arteriosclerotic coronary  artery disease    • Atrial fibrillation    • Coronary artery disease    • Diabetes     type II, mellitus   • Diplopia    • Discoid lupus erythematosus    • Graves disease    • History of echocardiogram     L ATRIUM ENLARGED, anterior myocardial infarction, lft axis deviation, abnormal ECG   • History of electrocardiogram     SHOWS NORMAL SINUS RHYTHM, ANTERIOR MYOCARDIAL INFARCTION, OLD LDEFT AXIS DEVIATION, LEFT ANTERIOR FASCICULAR BLOCK, ABNORMAL ECG   • Hyperinsulinism    • Hypertension    • Hypertensive heart disease    • Hypogonadism, male    • Hypothyroidism, postablative    • IBS (irritable bowel syndrome)    • Left ventricular hypertrophy    • Long-term insulin use    • Metabolic disorder    • Myocardial infarction of inferolateral wall greater than eight weeks ago     of inferior wall, greater than 8 weeks   • Osteoarthritis    • Peripheral vascular disease    • Right bundle branch block with left anterior fascicular block    • Syncope    • Thyrotoxicosis factitia    • Type 2 diabetes mellitus          Past Surgical History:   Procedure Laterality Date   • ABDOMINAL AORTA STENT Bilateral     USING VEIN: AORTOFEMORAL   • BACK SURGERY     • CATARACT EXTRACTION     • COLONOSCOPY     • OTHER SURGICAL HISTORY      CATHETER ABLATION   • REPLACEMENT TOTAL KNEE BILATERAL         Current Outpatient Prescriptions on File Prior to Visit   Medication Sig Dispense Refill   • ALPRAZolam (XANAX) 0.5 MG tablet Take 0.5 mg by mouth 2 (Two) Times a Day.     • aspirin 81 MG EC tablet Take 1 tablet by mouth daily.     • atorvastatin (LIPITOR) 40 MG tablet Take 1 tablet by mouth Daily. 90 tablet 2   • CALCIUM PO Take 1 tablet by mouth daily.     • carvedilol (COREG) 6.25 MG tablet TAKE ONE TABLET BY MOUTH TWICE A DAY WITH MEALS 180 tablet 3   • Cholecalciferol (VITAMIN D3) 2000 units tablet Take  by mouth.     • clopidogrel (PLAVIX) 75 MG tablet Take 1 tablet by mouth daily.     • DULoxetine (CYMBALTA) 30 MG capsule Take by  "mouth.     • DULoxetine (CYMBALTA) 60 MG capsule      • glimepiride (AMARYL) 2 MG tablet TAKE ONE TABLET BY MOUTH DAILY AS DIRECTED 90 tablet 0   • glucose blood (KIMBERLY CONTOUR TEST) test strip Kimberly Contour Test In Vitro Strip; Patient Sig: Kimberly Contour Test In Vitro Strip ; 100; 0; 06-May-2012; Active     • Insulin Pen Needle (NOVOFINE) 32G X 6 MM misc      • levothyroxine (SYNTHROID, LEVOTHROID) 175 MCG tablet TAKE ONE TABLET BY MOUTH DAILY 90 tablet 0   • Liraglutide (VICTOZA) 18 MG/3ML solution pen-injector injection Inject 1.8 mg under the skin Daily. 27 mL 1   • Magnesium 100 MG capsule Take  by mouth Daily.     • metFORMIN (GLUCOPHAGE) 1000 MG tablet TAKE ONE TABLET BY MOUTH TWICE A  tablet 0   • Multiple Vitamin (MULTI-DAY VITAMINS PO) Take  by mouth daily.     • Syringe/Needle, Disp, (B-D SYRINGE/NEEDLE 3CC/22GX1.5) 22G X 1-1/2\" 3 ML misc      • Testosterone Cypionate (DEPOTESTOTERONE CYPIONATE) 200 MG/ML injection INJECT 0.5 ML INTRAMUSCULARLY EVERY 10 DAYS 5 mL 0   • traZODone (DESYREL) 50 MG tablet Take 50 mg by mouth.     • Zinc 100 MG tablet Take 1 tablet by mouth daily.     • [DISCONTINUED] lisinopril-hydrochlorothiazide (PRINZIDE,ZESTORETIC) 20-25 MG per tablet Take 1 tablet by mouth Daily. 90 tablet 3     No current facility-administered medications on file prior to visit.        Social History     Social History   • Marital status:      Spouse name: N/A   • Number of children: N/A   • Years of education: N/A     Occupational History   • Not on file.     Social History Main Topics   • Smoking status: Former Smoker   • Smokeless tobacco: Never Used   • Alcohol use No      Comment: caffeine use   • Drug use: Unknown   • Sexual activity: Not on file     Other Topics Concern   • Not on file     Social History Narrative   • No narrative on file           Review of Systems   Constitution: Negative.   HENT: Negative for congestion.    Eyes: Negative for vision loss in left eye and vision " "loss in right eye.   Respiratory: Negative.  Negative for cough, hemoptysis, shortness of breath, sleep disturbances due to breathing, snoring, sputum production and wheezing.    Endocrine: Negative.    Hematologic/Lymphatic: Negative.    Skin: Negative for poor wound healing and rash.   Musculoskeletal: Negative for falls, gout, muscle cramps and myalgias.   Gastrointestinal: Negative for abdominal pain, diarrhea, dysphagia, hematemesis, melena, nausea and vomiting.   Neurological: Negative for excessive daytime sleepiness, dizziness, headaches, light-headedness, loss of balance, seizures and vertigo.   Psychiatric/Behavioral: Positive for depression. Negative for substance abuse. The patient is nervous/anxious.        Procedures    ECG 12 Lead  Date/Time: 4/10/2018 12:19 PM  Performed by: SONJA DURHAM  Authorized by: SONJA DURHAM   Comparison: compared with previous ECG   Similar to previous ECG  Rhythm: sinus rhythm  Conduction: LAFB  Clinical impression: abnormal ECG                Objective:      Vitals:    04/10/18 1158   BP: 122/86   BP Location: Right arm   Patient Position: Sitting   Pulse: 68   Weight: 91.9 kg (202 lb 9.6 oz)   Height: 180.3 cm (71\")     Body mass index is 28.26 kg/m².    Physical Exam   Constitutional: He is oriented to person, place, and time. He appears well-developed and well-nourished. No distress.   HENT:   Head: Normocephalic and atraumatic.   Eyes: Conjunctivae are normal. No scleral icterus.   Neck: Neck supple. No JVD present. Carotid bruit is not present. No thyromegaly present.   Cardiovascular: Normal rate, regular rhythm, S1 normal, S2 normal, normal heart sounds and intact distal pulses.   No extrasystoles are present. PMI is not displaced.  Exam reveals no gallop.    No murmur heard.  Pulses:       Carotid pulses are 2+ on the right side, and 2+ on the left side.       Radial pulses are 2+ on the right side, and 2+ on the left side.        Dorsalis pedis " pulses are 2+ on the right side, and 2+ on the left side.        Posterior tibial pulses are 2+ on the right side, and 2+ on the left side.   Pulmonary/Chest: Effort normal and breath sounds normal. No respiratory distress. He has no wheezes. He has no rhonchi. He has no rales. He exhibits no tenderness.   Abdominal: Soft. Bowel sounds are normal. He exhibits no distension, no abdominal bruit and no mass. There is no tenderness.   Musculoskeletal: He exhibits no edema or deformity.   Lymphadenopathy:     He has no cervical adenopathy.   Neurological: He is alert and oriented to person, place, and time. No cranial nerve deficit.   Skin: Skin is warm and dry. No rash noted. He is not diaphoretic. No cyanosis. No pallor. Nails show no clubbing.   Psychiatric: He has a normal mood and affect. Judgment normal.   Vitals reviewed.      Lab Review:       Assessment:      Diagnosis Plan   1. Chronic coronary artery disease     2. Essential hypertension     3. Hyperlipidemia, unspecified hyperlipidemia type     4. Peripheral vascular disease     5. Uncontrolled type 2 diabetes mellitus with complication, with long-term current use of insulin       1. History of atrial fibrillation due to thyroid toxicosis. No recurrence of atrial fibrillation.  2. Hypertension, under good control. Has history of left ventricular hypertrophy.  3. Hyperlipidemia, per Dr. Brown.   4. Diabetes mellitus type 2, per Dr. Brown. Well controlled.  5. Peripheral arterial disease status post bilateral aorta with bifemoral  bypasses. Follows with Dr. Olivera annually and everything has been stable.   6. Tobacco use. He stopped smoking in 2015.   7. Coronary disease with history of severe left anterior descending artery  Stenosis, treated medically due to good collaterals. No active angina or heart failure.   8. Abnormal ECG with left anterior fascicular block.  9. Osteoarthritis.   10. Stress with depression, under better control.       Plan:       F/u with Michelle in 1 year.  Start lisinopril 5mg daily.     Coronary Artery Disease  Assessment  • The patient has no angina    Subjective - Objective  • Current antiplatelet therapy includes aspirin 81 mg

## 2018-05-07 RX ORDER — LEVOTHYROXINE SODIUM 175 UG/1
TABLET ORAL
Qty: 90 TABLET | Refills: 0 | Status: SHIPPED | OUTPATIENT
Start: 2018-05-07 | End: 2018-08-09 | Stop reason: SDUPTHER

## 2018-05-29 RX ORDER — GLIMEPIRIDE 2 MG/1
TABLET ORAL
Qty: 90 TABLET | Refills: 0 | Status: SHIPPED | OUTPATIENT
Start: 2018-05-29 | End: 2018-09-01 | Stop reason: SDUPTHER

## 2018-06-25 RX ORDER — CARVEDILOL 6.25 MG/1
TABLET ORAL
Qty: 180 TABLET | Refills: 2 | Status: SHIPPED | OUTPATIENT
Start: 2018-06-25 | End: 2019-02-07

## 2018-07-16 ENCOUNTER — LAB (OUTPATIENT)
Dept: ENDOCRINOLOGY | Age: 68
End: 2018-07-16

## 2018-07-16 DIAGNOSIS — E29.1 HYPOGONADISM IN MALE: ICD-10-CM

## 2018-07-16 DIAGNOSIS — IMO0002 UNCONTROLLED TYPE 2 DIABETES MELLITUS WITH COMPLICATION, WITH LONG-TERM CURRENT USE OF INSULIN: ICD-10-CM

## 2018-07-16 DIAGNOSIS — IMO0002 UNCONTROLLED TYPE 2 DIABETES MELLITUS WITH COMPLICATION, WITH LONG-TERM CURRENT USE OF INSULIN: Primary | ICD-10-CM

## 2018-07-16 DIAGNOSIS — E89.0 POSTABLATIVE HYPOTHYROIDISM: ICD-10-CM

## 2018-07-20 LAB
ALBUMIN SERPL-MCNC: 4.1 G/DL (ref 3.6–4.8)
ALBUMIN/CREAT UR: 10.9 MG/G CREAT (ref 0–30)
ALBUMIN/GLOB SERPL: 1.7 {RATIO} (ref 1.2–2.2)
ALP SERPL-CCNC: 73 IU/L (ref 39–117)
ALT SERPL-CCNC: 32 IU/L (ref 0–44)
AST SERPL-CCNC: 24 IU/L (ref 0–40)
BILIRUB SERPL-MCNC: 0.3 MG/DL (ref 0–1.2)
BUN SERPL-MCNC: 15 MG/DL (ref 8–27)
BUN/CREAT SERPL: 14 (ref 10–24)
CALCIUM SERPL-MCNC: 9.6 MG/DL (ref 8.6–10.2)
CHLORIDE SERPL-SCNC: 100 MMOL/L (ref 96–106)
CO2 SERPL-SCNC: 24 MMOL/L (ref 20–29)
CREAT SERPL-MCNC: 1.1 MG/DL (ref 0.76–1.27)
CREAT UR-MCNC: 115.1 MG/DL
GLOBULIN SER CALC-MCNC: 2.4 G/DL (ref 1.5–4.5)
GLUCOSE SERPL-MCNC: 168 MG/DL (ref 65–99)
HBA1C MFR BLD: 8.4 % (ref 4.8–5.6)
MICROALBUMIN UR-MCNC: 12.6 UG/ML
POTASSIUM SERPL-SCNC: 5.3 MMOL/L (ref 3.5–5.2)
PROT SERPL-MCNC: 6.5 G/DL (ref 6–8.5)
SHBG SERPL-SCNC: 39.6 NMOL/L (ref 19.3–76.4)
SODIUM SERPL-SCNC: 139 MMOL/L (ref 134–144)
T4 FREE SERPL-MCNC: 1.64 NG/DL (ref 0.82–1.77)
TESTOST FREE SERPL-MCNC: 22.2 PG/ML (ref 6.6–18.1)
TESTOST SERPL-MCNC: 1053 NG/DL (ref 264–916)
TSH SERPL DL<=0.005 MIU/L-ACNC: 3.34 UIU/ML (ref 0.45–4.5)

## 2018-08-01 ENCOUNTER — HOSPITAL ENCOUNTER (OUTPATIENT)
Dept: ULTRASOUND IMAGING | Facility: HOSPITAL | Age: 68
Discharge: HOME | End: 2018-08-01
Attending: INTERNAL MEDICINE | Admitting: INTERNAL MEDICINE

## 2018-08-01 DIAGNOSIS — E89.0 POSTABLATIVE HYPOTHYROIDISM: ICD-10-CM

## 2018-08-01 PROCEDURE — 76536 US EXAM OF HEAD AND NECK: CPT

## 2018-08-09 ENCOUNTER — TELEPHONE (OUTPATIENT)
Dept: ENDOCRINOLOGY | Age: 68
End: 2018-08-09

## 2018-08-09 RX ORDER — LEVOTHYROXINE SODIUM 175 UG/1
175 TABLET ORAL DAILY
Qty: 90 TABLET | Refills: 1 | Status: SHIPPED | OUTPATIENT
Start: 2018-08-09 | End: 2019-02-02 | Stop reason: SDUPTHER

## 2018-08-09 NOTE — TELEPHONE ENCOUNTER
----- Message from Sylvie Haynes sent at 8/9/2018 10:41 AM EDT -----  Contact: PATIENT   PATIENT   REQUEST TO REFILL MEDICATION:  MEDICATION:  levothyroxine (SYNTHROID, LEVOTHROID) 175 MCG tablet -    MESSAGE:  PATIENT HAS CALLED IN REGARDS TO GETTING THE ABOVE MEDICATION REFILLED. PATIENT IS STATING [facility] HAS SENT OVER TWO REQUEST TO GET THIS MEDICATION REFILLED.  PATIENT IS STILL TAKING   1 TABLET DAILY.     PLEASE SEND THE MEDICATION TO THE FOLLOWING PHARMACY   [address] - [phone] PH - [phone] FX      PHONE NUMBER: [phone]    SCRIPT SENT

## 2018-08-15 DIAGNOSIS — IMO0002 UNCONTROLLED TYPE 2 DIABETES MELLITUS WITH COMPLICATION, WITHOUT LONG-TERM CURRENT USE OF INSULIN: ICD-10-CM

## 2018-08-15 RX ORDER — LIRAGLUTIDE 6 MG/ML
INJECTION SUBCUTANEOUS
Qty: 3 PEN | Refills: 1 | Status: SHIPPED | OUTPATIENT
Start: 2018-08-15 | End: 2018-10-19 | Stop reason: SDUPTHER

## 2018-08-21 ENCOUNTER — LAB (OUTPATIENT)
Dept: ENDOCRINOLOGY | Age: 68
End: 2018-08-21

## 2018-08-21 DIAGNOSIS — E89.0 POSTABLATIVE HYPOTHYROIDISM: ICD-10-CM

## 2018-08-21 DIAGNOSIS — E89.0 POSTABLATIVE HYPOTHYROIDISM: Primary | ICD-10-CM

## 2018-08-21 DIAGNOSIS — IMO0002 UNCONTROLLED TYPE 2 DIABETES MELLITUS WITH COMPLICATION, WITH LONG-TERM CURRENT USE OF INSULIN: ICD-10-CM

## 2018-08-21 DIAGNOSIS — E29.1 HYPOGONADISM IN MALE: ICD-10-CM

## 2018-08-23 LAB
ALBUMIN SERPL-MCNC: 4.2 G/DL (ref 3.6–4.8)
ALBUMIN/CREAT UR: 7 MG/G CREAT (ref 0–30)
ALBUMIN/GLOB SERPL: 1.9 {RATIO} (ref 1.2–2.2)
ALP SERPL-CCNC: 65 IU/L (ref 39–117)
ALT SERPL-CCNC: 32 IU/L (ref 0–44)
AST SERPL-CCNC: 20 IU/L (ref 0–40)
BILIRUB SERPL-MCNC: 0.4 MG/DL (ref 0–1.2)
BUN SERPL-MCNC: 15 MG/DL (ref 8–27)
BUN/CREAT SERPL: 15 (ref 10–24)
CALCIUM SERPL-MCNC: 9.4 MG/DL (ref 8.6–10.2)
CHLORIDE SERPL-SCNC: 99 MMOL/L (ref 96–106)
CO2 SERPL-SCNC: 25 MMOL/L (ref 20–29)
CREAT SERPL-MCNC: 0.98 MG/DL (ref 0.76–1.27)
CREAT UR-MCNC: 99.1 MG/DL
GLOBULIN SER CALC-MCNC: 2.2 G/DL (ref 1.5–4.5)
GLUCOSE SERPL-MCNC: 176 MG/DL (ref 65–99)
HBA1C MFR BLD: 7.5 % (ref 4.8–5.6)
HCV AB S/CO SERPL IA: <0.1 S/CO RATIO (ref 0–0.9)
MICROALBUMIN UR-MCNC: 6.9 UG/ML
POTASSIUM SERPL-SCNC: 4.9 MMOL/L (ref 3.5–5.2)
PROT SERPL-MCNC: 6.4 G/DL (ref 6–8.5)
PSA SERPL-MCNC: 1.9 NG/ML (ref 0–4)
SHBG SERPL-SCNC: 36.7 NMOL/L (ref 19.3–76.4)
SODIUM SERPL-SCNC: 139 MMOL/L (ref 134–144)
T4 FREE SERPL-MCNC: 1.62 NG/DL (ref 0.82–1.77)
TESTOST FREE SERPL-MCNC: 12.9 PG/ML (ref 6.6–18.1)
TESTOST SERPL-MCNC: 665 NG/DL (ref 264–916)
TSH SERPL DL<=0.005 MIU/L-ACNC: 1.52 UIU/ML (ref 0.45–4.5)

## 2018-09-04 ENCOUNTER — OFFICE VISIT (OUTPATIENT)
Dept: ENDOCRINOLOGY | Age: 68
End: 2018-09-04

## 2018-09-04 VITALS
WEIGHT: 199.8 LBS | HEART RATE: 71 BPM | DIASTOLIC BLOOD PRESSURE: 72 MMHG | BODY MASS INDEX: 27.97 KG/M2 | HEIGHT: 71 IN | SYSTOLIC BLOOD PRESSURE: 122 MMHG

## 2018-09-04 DIAGNOSIS — E11.69 HYPERLIPIDEMIA ASSOCIATED WITH TYPE 2 DIABETES MELLITUS (HCC): ICD-10-CM

## 2018-09-04 DIAGNOSIS — IMO0002 UNCONTROLLED TYPE 2 DIABETES MELLITUS WITH COMPLICATION, WITH LONG-TERM CURRENT USE OF INSULIN: Primary | ICD-10-CM

## 2018-09-04 DIAGNOSIS — E78.5 HYPERLIPIDEMIA ASSOCIATED WITH TYPE 2 DIABETES MELLITUS (HCC): ICD-10-CM

## 2018-09-04 DIAGNOSIS — E29.1 HYPOGONADISM IN MALE: ICD-10-CM

## 2018-09-04 DIAGNOSIS — E89.0 POSTABLATIVE HYPOTHYROIDISM: ICD-10-CM

## 2018-09-04 DIAGNOSIS — I73.9 PERIPHERAL VASCULAR DISEASE (HCC): ICD-10-CM

## 2018-09-04 DIAGNOSIS — E16.1 HYPERINSULINISM: ICD-10-CM

## 2018-09-04 DIAGNOSIS — E05.00 GRAVES DISEASE: ICD-10-CM

## 2018-09-04 PROCEDURE — 99214 OFFICE O/P EST MOD 30 MIN: CPT | Performed by: INTERNAL MEDICINE

## 2018-09-04 RX ORDER — GLIMEPIRIDE 2 MG/1
TABLET ORAL
Qty: 90 TABLET | Refills: 0 | Status: SHIPPED | OUTPATIENT
Start: 2018-09-04 | End: 2018-12-09 | Stop reason: SDUPTHER

## 2018-09-04 RX ORDER — ATORVASTATIN CALCIUM 40 MG/1
40 TABLET, FILM COATED ORAL DAILY
Qty: 90 TABLET | Refills: 2 | Status: SHIPPED | OUTPATIENT
Start: 2018-09-04 | End: 2019-06-07 | Stop reason: SDUPTHER

## 2018-09-04 NOTE — PROGRESS NOTES
68 y.o.    Patient Care Team:  Hardik Rizvi MD as PCP - General  Paredes, Beverly Bautista MD as PCP - Claims Attributed    Chief Complaint:      F/U TYPE 2 DIABETES, UNCONTROLLED.  POSTABLATIVE HYPOTHYROIDISM.  HYPOGONADISM MALE.  HERE TO DISCUSS LAB RESULTS  Subjective   HPI  Patient is a 68-year-old white male with a past history of post ablative hypothyroidism, Graves' disease, uncontrolled type 2 diabetes mellitus and hypogonadism came for follow-up    Post ablative hypothyroidism  Patient is currently taking 175 µg levothyroxine daily.  He reports compliance with the medication and denies any side effects  He takes the medication at the morning daily.  Uncontrolled type 2 diabetes mellitus  Patient reported that most of his blood sugars are between 122 250 range  They're slightly higher than before  He's currently taking victoza 1.8 mg along with metformin 1000 twice daily and glimepiride 2 mg daily in the morning  Hypoglycemia  Patient is occasionally expressing hypoglycemia with blood sugar dropping into 60-70 range  Graves' disease  Patient has ophthalmopathy  He has regular follow-up with ophthalmologist for double vision  Hypogonadism  Patient is currently taking testosterone injections every 10 days as recommended  He reports no side effects  He also denies any significant improvement in his symptoms  Patient denied any knowledge of diabetic retinopathy or nephropathy or neuropathy.        Interval History      Hypothyroidism post ablative  Patient is compliant with his medication. He denied any side effects.he is currently on 200 micrograms Synthroid daily  Type II diabetes mellitus  Patient is currently taking Victoza 1.8 mg,, metformin 1000 mg twice daily.patient reports that his blood sugars are doing well. Usually averaging around 140; he also started Amaryl one tablet twice daily. He did not take any insulin in the past 3 months.  hypoglycemia  Patient had a few episodes of hypoglycemia  especially in the early morning hours in the late evening hours into the 56 and 61 range  Hypogonadism  patient is currently on testosterone injections 0.75 ml every 2 weeks and reports no side effects.  Last testosterone injection was approximately one week ago Friday  He'll get lab testing done today  Graves' disease with ophthalmopathy  Patient reported that he had significant problems finding an ophthalmologist to believe his symptoms of double vision in the fact that he had Graves' disease. He finally found and ophthalmologist at [facility] Dr Yesi Vazquez who examined him and did a CT scan of the orbit and confirmed that he had a right lateral and left medial rectus enlargement on the CT scan confirming diplopia in the horizontal direction.  Patient does have a regular followup ware inith this ophthalmologist  Hyperlipidemia  Patient is currently on medication and denied any side effects.  Interval history  Patient reported that he just had back surgery approximately 3 weeks ago and currently has a back brace and walking without any Cane   Patient reported that he has been tolerating the testosterone injections 0.5 mL every 2 weeks  He reported most of his blood sugars are less than 150  He reports increasing stress at home from a 94-year-old mother living with them  Diabetes   He has type 2 diabetes mellitus. No MedicAlert identification noted. The initial diagnosis of diabetes was made 40 years ago. Pertinent negatives for hypoglycemia include no confusion, dizziness, headaches, hunger, mood changes, nervousness/anxiousness, pallor, seizures, sleepiness, speech difficulty, sweats or tremors. Pertinent negatives for diabetes include no blurred vision, no chest pain, no fatigue, no foot paresthesias, no foot ulcerations, no polydipsia, no polyphagia, no polyuria, no visual change, no weakness and no weight loss. Hypoglycemia complications include blackouts and required assistance. Pertinent  negatives for hypoglycemia complications include no hospitalization, no nocturnal hypoglycemia and no required glucagon injection. Symptoms are worsening. Diabetic complications include PVD and retinopathy. Pertinent negatives for diabetic complications include no CVA, heart disease, impotence, nephropathy or peripheral neuropathy. There are no known risk factors for coronary artery disease. Current diabetic treatment includes oral agent (triple therapy). He is compliant with treatment most of the time. He is currently taking insulin pre-breakfast. Insulin injections are given by patient. Rotation sites for injection include the lower legs. His weight is stable. He is following a generally healthy diet. Meal planning includes avoidance of concentrated sweets. He has not had a previous visit with a dietitian. He participates in exercise daily. He monitors blood glucose at home 1-2 x per day. He monitors urine at home <1 x per month. Blood glucose monitoring compliance is excellent. His home blood glucose trend is increasing rapidly. His breakfast blood glucose is taken between 7-8 am. His breakfast blood glucose range is generally 140-180 mg/dl. His highest blood glucose is >200 mg/dl. His overall blood glucose range is 140-180 mg/dl. He does not see a podiatrist.Eye exam is current.         Interval History      The following portions of the patient's history were reviewed and updated as appropriate: allergies, current medications, past family history, past medical history, past social history, past surgical history and problem list.    Past Medical History:   Diagnosis Date   • Arteriosclerotic coronary artery disease    • Atrial fibrillation (CMS/HCC)    • Coronary artery disease    • Diabetes (CMS/HCC)     type II, mellitus   • Diplopia    • Discoid lupus erythematosus    • Graves disease    • History of echocardiogram     L ATRIUM ENLARGED, anterior myocardial infarction, lft axis deviation, abnormal ECG   •  History of electrocardiogram     SHOWS NORMAL SINUS RHYTHM, ANTERIOR MYOCARDIAL INFARCTION, OLD LDEFT AXIS DEVIATION, LEFT ANTERIOR FASCICULAR BLOCK, ABNORMAL ECG   • Hyperinsulinism    • Hypertension    • Hypertensive heart disease    • Hypogonadism, male    • Hypothyroidism, postablative    • IBS (irritable bowel syndrome)    • Left ventricular hypertrophy    • Long-term insulin use (CMS/HCC)    • Metabolic disorder    • Myocardial infarction of inferolateral wall greater than eight weeks ago     of inferior wall, greater than 8 weeks   • Osteoarthritis    • Peripheral vascular disease (CMS/HCC)    • Right bundle branch block with left anterior fascicular block    • Syncope    • Thyrotoxicosis factitia    • Type 2 diabetes mellitus (CMS/HCC)      Family History   Problem Relation Age of Onset   • Coronary artery disease Other      Social History     Social History   • Marital status:      Spouse name: N/A   • Number of children: N/A   • Years of education: N/A     Occupational History   • Not on file.     Social History Main Topics   • Smoking status: Former Smoker   • Smokeless tobacco: Never Used   • Alcohol use No      Comment: caffeine use   • Drug use: Unknown   • Sexual activity: Not on file     Other Topics Concern   • Not on file     Social History Narrative   • No narrative on file     No Known Allergies    Current Outpatient Prescriptions:   •  ALPRAZolam (XANAX) 0.5 MG tablet, Take 0.5 mg by mouth 2 (Two) Times a Day., Disp: , Rfl:   •  aspirin 81 MG EC tablet, Take 1 tablet by mouth daily., Disp: , Rfl:   •  atorvastatin (LIPITOR) 40 MG tablet, Take 1 tablet by mouth Daily., Disp: 90 tablet, Rfl: 2  •  CALCIUM PO, Take 1 tablet by mouth daily., Disp: , Rfl:   •  carvedilol (COREG) 6.25 MG tablet, TAKE ONE TABLET BY MOUTH TWICE A DAY WITH MEALS, Disp: 180 tablet, Rfl: 2  •  Cholecalciferol (VITAMIN D3) 2000 units tablet, Take  by mouth., Disp: , Rfl:   •  clopidogrel (PLAVIX) 75 MG tablet, Take 1  "tablet by mouth daily., Disp: , Rfl:   •  DULoxetine (CYMBALTA) 30 MG capsule, Take by mouth., Disp: , Rfl:   •  DULoxetine (CYMBALTA) 60 MG capsule, , Disp: , Rfl:   •  glimepiride (AMARYL) 2 MG tablet, TAKE ONE TABLET BY MOUTH DAILY AS DIRECTED, Disp: 90 tablet, Rfl: 0  •  glucose blood (KIMBERLY CONTOUR TEST) test strip, Kimberly Contour Test In Vitro Strip; Patient Sig: Kimberly Contour Test In Vitro Strip ; 100; 0; 06-May-2012; Active, Disp: , Rfl:   •  Insulin Pen Needle (NOVOFINE) 32G X 6 MM misc, , Disp: , Rfl:   •  levothyroxine (SYNTHROID, LEVOTHROID) 175 MCG tablet, Take 1 tablet by mouth Daily., Disp: 90 tablet, Rfl: 1  •  lisinopril (PRINIVIL,ZESTRIL) 5 MG tablet, Take 1 tablet by mouth Daily., Disp: 90 tablet, Rfl: 3  •  Magnesium 100 MG capsule, Take  by mouth Daily., Disp: , Rfl:   •  metFORMIN (GLUCOPHAGE) 1000 MG tablet, TAKE ONE TABLET BY MOUTH TWICE A DAY, Disp: 180 tablet, Rfl: 0  •  Multiple Vitamin (MULTI-DAY VITAMINS PO), Take  by mouth daily., Disp: , Rfl:   •  Syringe/Needle, Disp, (B-D SYRINGE/NEEDLE 3CC/22GX1.5) 22G X 1-1/2\" 3 ML misc, , Disp: , Rfl:   •  Testosterone Cypionate (DEPOTESTOTERONE CYPIONATE) 200 MG/ML injection, INJECT 0.5 ML INTRAMUSCULARLY EVERY 10 DAYS, Disp: 5 mL, Rfl: 0  •  traZODone (DESYREL) 50 MG tablet, Take 50 mg by mouth., Disp: , Rfl:   •  VICTOZA 18 MG/3ML solution pen-injector injection, DIAL AND INJECT SUBCUTANEOUSLY 1.8MG DAILY, Disp: 3 pen, Rfl: 1  •  Zinc 100 MG tablet, Take 1 tablet by mouth daily., Disp: , Rfl:         Review of Systems   Constitutional: Negative for fatigue and weight loss.   Eyes: Negative for blurred vision.   Cardiovascular: Negative for chest pain.   Endocrine: Negative for polydipsia, polyphagia and polyuria.   Genitourinary: Negative for impotence.   Skin: Negative for pallor.   Neurological: Negative for dizziness, tremors, seizures, speech difficulty, weakness and headaches.   Psychiatric/Behavioral: Negative for confusion. The patient is " "not nervous/anxious.    All other systems reviewed and are negative.      Objective       Vitals:    09/04/18 0954   BP: 122/72   Pulse: 71   Weight: 90.6 kg (199 lb 12.8 oz)   Height: 180.3 cm (71\")     Body mass index is 27.87 kg/m².      Physical Exam   Constitutional: He is oriented to person, place, and time. He appears well-developed and well-nourished.   HENT:   Head: Normocephalic and atraumatic.   Eyes: Pupils are equal, round, and reactive to light. EOM are normal.   Neck: Normal range of motion. Neck supple. No tracheal deviation present. No thyromegaly present.   Cardiovascular: Normal rate, regular rhythm, normal heart sounds and intact distal pulses.    Pulmonary/Chest: Effort normal and breath sounds normal.   Abdominal: Soft. Bowel sounds are normal. He exhibits no distension. There is no tenderness.   Musculoskeletal: Normal range of motion. He exhibits no edema.   Neurological: He is alert and oriented to person, place, and time. He has normal reflexes.   Skin: Skin is warm and dry. No rash noted.   Psychiatric: He has a normal mood and affect. His behavior is normal.   Nursing note and vitals reviewed.    Results Review:     I reviewed the patient's new clinical results.    Medical records reviewed  Summary:      Lab on 08/21/2018   Component Date Value Ref Range Status   • Glucose 08/21/2018 176* 65 - 99 mg/dL Final   • BUN 08/21/2018 15  8 - 27 mg/dL Final   • Creatinine 08/21/2018 0.98  0.76 - 1.27 mg/dL Final   • eGFR Non  Am 08/21/2018 78  >59 mL/min/1.73 Final   • eGFR African Am 08/21/2018 91  >59 mL/min/1.73 Final   • BUN/Creatinine Ratio 08/21/2018 15  10 - 24 Final   • Sodium 08/21/2018 139  134 - 144 mmol/L Final   • Potassium 08/21/2018 4.9  3.5 - 5.2 mmol/L Final   • Chloride 08/21/2018 99  96 - 106 mmol/L Final   • Total CO2 08/21/2018 25  20 - 29 mmol/L Final   • Calcium 08/21/2018 9.4  8.6 - 10.2 mg/dL Final   • Total Protein 08/21/2018 6.4  6.0 - 8.5 g/dL Final   • Albumin " 08/21/2018 4.2  3.6 - 4.8 g/dL Final   • Globulin 08/21/2018 2.2  1.5 - 4.5 g/dL Final   • A/G Ratio 08/21/2018 1.9  1.2 - 2.2 Final   • Total Bilirubin 08/21/2018 0.4  0.0 - 1.2 mg/dL Final   • Alkaline Phosphatase 08/21/2018 65  39 - 117 IU/L Final   • AST (SGOT) 08/21/2018 20  0 - 40 IU/L Final   • ALT (SGPT) 08/21/2018 32  0 - 44 IU/L Final   • Hemoglobin A1C 08/21/2018 7.5* 4.8 - 5.6 % Final    Comment:          Prediabetes: 5.7 - 6.4           Diabetes: >6.4           Glycemic control for adults with diabetes: <7.0     • Free T4 08/21/2018 1.62  0.82 - 1.77 ng/dL Final   • TSH 08/21/2018 1.520  0.450 - 4.500 uIU/mL Final   • Testosterone, Total 08/21/2018 665  264 - 916 ng/dL Final    Comment: Adult male reference interval is based on a population of  healthy nonobese males (BMI <30) between 19 and 39 years old.  Abilio, et.al. JCEM 2017,102;5904-2481. PMID: 14440215.     • Testosterone, Free 08/21/2018 12.9  6.6 - 18.1 pg/mL Final   • Sex Hormone Binding Globulin 08/21/2018 36.7  19.3 - 76.4 nmol/L Final   • Creatinine, Urine 08/21/2018 99.1  Not Estab. mg/dL Final   • Microalbumin, Urine 08/21/2018 6.9  Not Estab. ug/mL Final   • Microalbumin/Creatinine Ratio 08/21/2018 7.0  0.0 - 30.0 mg/g creat Final   • PSA 08/21/2018 1.9  0.0 - 4.0 ng/mL Final    Comment: Roche ECLIA methodology.  According to the American Urological Association, Serum PSA should  decrease and remain at undetectable levels after radical  prostatectomy. The AUA defines biochemical recurrence as an initial  PSA value 0.2 ng/mL or greater followed by a subsequent confirmatory  PSA value 0.2 ng/mL or greater.  Values obtained with different assay methods or kits cannot be used  interchangeably. Results cannot be interpreted as absolute evidence  of the presence or absence of malignant disease.     • Hep C Virus Ab 08/21/2018 <0.1  0.0 - 0.9 s/co ratio Final    Comment:                                   Negative:     < 0.8                                Indeterminate: 0.8 - 0.9                                    Positive:     > 0.9   The CDC recommends that a positive HCV antibody result   be followed up with a HCV Nucleic Acid Amplification   test ([id number]).       Lab Results   Component Value Date    HGBA1C 7.5 (H) 08/21/2018    HGBA1C 8.4 (H) 07/16/2018    HGBA1C 7.20 (H) 03/23/2018     Lab Results   Component Value Date    MICROALBUR 6.9 08/21/2018    CREATININE 0.98 08/21/2018     Imaging Results (most recent)     None          Assessment and Plan:    Diagnoses and all orders for this visit:    Uncontrolled type 2 diabetes mellitus with complication, with long-term current use of insulin (CMS/HCC)    Hyperinsulinism    Postablative hypothyroidism    Graves disease    Peripheral vascular disease (CMS/HCC)    Hyperlipidemia associated with type 2 diabetes mellitus (CMS/HCC)    Hypogonadism in male        #1 uncontrolled type II diabetes mellitus.  #2 post ablative hypothyroidism; stable and no change in the dosage  #3 Graves' disease With ophthalmopathy  #4 hypogonadism- patient is currently on testosterone 0.5 cc injections every 2 weeks  #5 hyperlipidemia  #6 insulin management; not on insulin currently. He is only taking victoza and Amaryl and metformin  #7 hypoglycemia;      Patient's glucometer readings reviewed that majority are in the 100-250  He occasionally has hypoglycemia the blood sugar dropping into 70 and 80 range  Patient is reporting increasing sweating especially after exercise  He denies any palpitations  He's currently taking levothyroxine 175 µg daily.  He reports compliance with the medication    Lab reports reviewed  Patient's hemoglobin A1c 7.5% slightly better than before.  TSH is 1.5 and stable    Patient is currently taking testosterone every 10 days  He is currently taking Amaryl 2 mg daily along with metformin 1000 mg twice daily and victoza 1.8 mg daily    Patient will return to follow-up in 6-8 weeks    The total time  "spent  was more than 25 min of which greater than 15 min of time ( greater than 50% of the total time )  was spent face to face with the patient counseling and coordination of care on recommended evaluation and treatment options, instructions for management/treatment and /or follow up  and importance of compliance with chosen management or treatment options    Curt Brown MD. FACE    09/04/18      EMR Dragon / transcription disclaimer:     \"Dictated utilizing Dragon dictation\".         "

## 2018-09-13 RX ORDER — TESTOSTERONE CYPIONATE 200 MG/ML
INJECTION, SOLUTION INTRAMUSCULAR
Qty: 2 ML | Refills: 5 | Status: SHIPPED | OUTPATIENT
Start: 2018-09-13 | End: 2019-02-07

## 2018-09-15 PROBLEM — E11.69 HYPERLIPIDEMIA ASSOCIATED WITH TYPE 2 DIABETES MELLITUS (HCC): Status: ACTIVE | Noted: 2018-09-15

## 2018-09-15 PROBLEM — E78.5 HYPERLIPIDEMIA ASSOCIATED WITH TYPE 2 DIABETES MELLITUS (HCC): Status: ACTIVE | Noted: 2018-09-15

## 2018-10-08 ENCOUNTER — LAB (OUTPATIENT)
Dept: ENDOCRINOLOGY | Age: 68
End: 2018-10-08

## 2018-10-08 DIAGNOSIS — E11.69 HYPERLIPIDEMIA ASSOCIATED WITH TYPE 2 DIABETES MELLITUS (HCC): ICD-10-CM

## 2018-10-08 DIAGNOSIS — IMO0002 UNCONTROLLED TYPE 2 DIABETES MELLITUS WITH COMPLICATION, WITH LONG-TERM CURRENT USE OF INSULIN: Primary | ICD-10-CM

## 2018-10-08 DIAGNOSIS — E89.0 POSTABLATIVE HYPOTHYROIDISM: ICD-10-CM

## 2018-10-08 DIAGNOSIS — E78.5 HYPERLIPIDEMIA ASSOCIATED WITH TYPE 2 DIABETES MELLITUS (HCC): ICD-10-CM

## 2018-10-08 DIAGNOSIS — E29.1 HYPOGONADISM IN MALE: ICD-10-CM

## 2018-10-08 DIAGNOSIS — IMO0002 UNCONTROLLED TYPE 2 DIABETES MELLITUS WITH COMPLICATION, WITH LONG-TERM CURRENT USE OF INSULIN: ICD-10-CM

## 2018-10-10 LAB
ALBUMIN SERPL-MCNC: 4.2 G/DL (ref 3.5–5.2)
ALBUMIN/CREAT UR: 6.1 MG/G CREAT (ref 0–30)
ALBUMIN/GLOB SERPL: 1.7 G/DL
ALP SERPL-CCNC: 70 U/L (ref 39–117)
ALT SERPL-CCNC: 30 U/L (ref 1–41)
AST SERPL-CCNC: 19 U/L (ref 1–40)
BILIRUB SERPL-MCNC: 0.3 MG/DL (ref 0.1–1.2)
BUN SERPL-MCNC: 15 MG/DL (ref 8–23)
BUN/CREAT SERPL: 13.6 (ref 7–25)
CALCIUM SERPL-MCNC: 9.5 MG/DL (ref 8.6–10.5)
CHLORIDE SERPL-SCNC: 104 MMOL/L (ref 98–107)
CHOLEST SERPL-MCNC: 127 MG/DL (ref 0–200)
CO2 SERPL-SCNC: 29 MMOL/L (ref 22–29)
CREAT SERPL-MCNC: 1.1 MG/DL (ref 0.76–1.27)
CREAT UR-MCNC: 84.7 MG/DL
GLOBULIN SER CALC-MCNC: 2.5 GM/DL
GLUCOSE SERPL-MCNC: 169 MG/DL (ref 65–99)
HBA1C MFR BLD: 7.32 % (ref 4.8–5.6)
HDLC SERPL-MCNC: 37 MG/DL (ref 40–60)
INTERPRETATION: NORMAL
LDLC SERPL CALC-MCNC: 60 MG/DL (ref 0–100)
Lab: NORMAL
MICROALBUMIN UR-MCNC: 5.2 UG/ML
POTASSIUM SERPL-SCNC: 5.2 MMOL/L (ref 3.5–5.2)
PROT SERPL-MCNC: 6.7 G/DL (ref 6–8.5)
SHBG SERPL-SCNC: 34.4 NMOL/L (ref 19.3–76.4)
SODIUM SERPL-SCNC: 143 MMOL/L (ref 136–145)
T4 FREE SERPL-MCNC: 1.77 NG/DL (ref 0.93–1.7)
TESTOST FREE SERPL-MCNC: 6.1 PG/ML (ref 6.6–18.1)
TESTOST SERPL-MCNC: 255 NG/DL (ref 264–916)
TRIGL SERPL-MCNC: 148 MG/DL (ref 0–150)
TSH SERPL DL<=0.005 MIU/L-ACNC: 1.1 MIU/ML (ref 0.27–4.2)
VLDLC SERPL CALC-MCNC: 29.6 MG/DL (ref 5–40)

## 2018-10-16 ENCOUNTER — OFFICE VISIT (OUTPATIENT)
Dept: ENDOCRINOLOGY | Age: 68
End: 2018-10-16

## 2018-10-16 VITALS
HEART RATE: 72 BPM | SYSTOLIC BLOOD PRESSURE: 118 MMHG | WEIGHT: 198.4 LBS | HEIGHT: 71 IN | DIASTOLIC BLOOD PRESSURE: 72 MMHG | BODY MASS INDEX: 27.77 KG/M2

## 2018-10-16 DIAGNOSIS — E78.5 HYPERLIPIDEMIA ASSOCIATED WITH TYPE 2 DIABETES MELLITUS (HCC): ICD-10-CM

## 2018-10-16 DIAGNOSIS — E89.0 POSTABLATIVE HYPOTHYROIDISM: ICD-10-CM

## 2018-10-16 DIAGNOSIS — E29.1 HYPOGONADISM IN MALE: ICD-10-CM

## 2018-10-16 DIAGNOSIS — E05.00 GRAVES DISEASE: ICD-10-CM

## 2018-10-16 DIAGNOSIS — I73.9 PERIPHERAL VASCULAR DISEASE (HCC): ICD-10-CM

## 2018-10-16 DIAGNOSIS — E16.1 HYPERINSULINISM: ICD-10-CM

## 2018-10-16 DIAGNOSIS — E11.69 HYPERLIPIDEMIA ASSOCIATED WITH TYPE 2 DIABETES MELLITUS (HCC): ICD-10-CM

## 2018-10-16 DIAGNOSIS — IMO0002 UNCONTROLLED TYPE 2 DIABETES MELLITUS WITH COMPLICATION, WITHOUT LONG-TERM CURRENT USE OF INSULIN: Primary | ICD-10-CM

## 2018-10-16 PROCEDURE — 99214 OFFICE O/P EST MOD 30 MIN: CPT | Performed by: INTERNAL MEDICINE

## 2018-10-16 NOTE — PROGRESS NOTES
68 y.o.    Patient Care Team:  Phil Gilliam MD as PCP - General (Internal Medicine)  Beverly Paredes MD as PCP - Claims Attributed    Chief Complaint:      F/u type 2 diabetes, uncontrolled.  Hypogonadism male.  Hypothyroidism.  Here to discuss lab results.    Subjective     HPI   Patient is a 68-year-old white male with a past history of post ablative hypothyroidism, Graves' disease, uncontrolled type 2 diabetes mellitus and hypogonadism came for follow-up    Post ablative hypothyroidism  Patient is currently taking levothyroxine 175 µg daily.  He reports compliance with the medication  He denies any side effects  He takes the medication early morning daily  Uncontrolled type 2 diabetes mellitus  Patient's last hemoglobin A1c was 7.5%  Most of his blood sugars are in the 100-180 range  His currently taking victoza 1.8 mg along with metformin 1000 mg twice daily and glimepiride 2 mg daily in the morning  Hypoglycemia  Patient occasionally experiences hypoglycemia especially when he is physically very active  Graves' disease  Patient has ophthalmopathy and has regular follow-up with ophthalmologist for double vision for some time.  It is stable  Hypogonadism  Patient is currently taking testosterone injections 0.5 ML every 10 days as recommended  He denies any side effects  He reports compliance with the medication  Patient denied any knowledge of diabetic retinopathy or nephropathy or neuropathy    Patient reports that he completed the PET scan in the spot in the lung is now suspected to be a scar and he is rather pleased with that       Interval History      Hypothyroidism post ablative  Patient is compliant with his medication. He denied any side effects.he is currently on 200 micrograms Synthroid daily  Type II diabetes mellitus  Patient is currently taking Victoza 1.8 mg,, metformin 1000 mg twice daily.patient reports that his blood sugars are doing well. Usually averaging around 140; he also  started Amaryl one tablet twice daily. He did not take any insulin in the past 3 months.  hypoglycemia  Patient had a few episodes of hypoglycemia especially in the early morning hours in the late evening hours into the 56 and 61 range  Hypogonadism  patient is currently on testosterone injections 0.75 ml every 2 weeks and reports no side effects.  Last testosterone injection was approximately one week ago Friday  He'll get lab testing done today  Graves' disease with ophthalmopathy  Patient reported that he had significant problems finding an ophthalmologist to believe his symptoms of double vision in the fact that he had Graves' disease. He finally found and ophthalmologist at [facility] [place] Yesi Vazquez who examined him and did a CT scan of the orbit and confirmed that he had a right lateral and left medial rectus enlargement on the CT scan confirming diplopia in the horizontal direction.  Patient does have a regular followup ware inith this ophthalmologist  Hyperlipidemia  Patient is currently on medication and denied any side effects.  Interval history  Patient reported that he just had back surgery approximately 3 weeks ago and currently has a back brace and walking without any Cane   Patient reported that he has been tolerating the testosterone injections 0.5 mL every 2 weeks  He reported most of his blood sugars are less than 150  He reports increasing stress at home from a 94-year-old mother living with them  The following portions of the patient's history were reviewed and updated as appropriate: allergies, current medications, past family history, past medical history, past social history, past surgical history and problem list.    Past Medical History:   Diagnosis Date   • Arteriosclerotic coronary artery disease    • Atrial fibrillation (CMS/HCC)    • Coronary artery disease    • Diabetes (CMS/HCC)     type II, mellitus   • Diplopia    • Discoid lupus erythematosus    • Graves disease     • History of echocardiogram     L ATRIUM ENLARGED, anterior myocardial infarction, lft axis deviation, abnormal ECG   • History of electrocardiogram     SHOWS NORMAL SINUS RHYTHM, ANTERIOR MYOCARDIAL INFARCTION, OLD LDEFT AXIS DEVIATION, LEFT ANTERIOR FASCICULAR BLOCK, ABNORMAL ECG   • Hyperinsulinism    • Hypertension    • Hypertensive heart disease    • Hypogonadism, male    • Hypothyroidism, postablative    • IBS (irritable bowel syndrome)    • Left ventricular hypertrophy    • Long-term insulin use (CMS/HCC)    • Metabolic disorder    • Myocardial infarction of inferolateral wall greater than eight weeks ago     of inferior wall, greater than 8 weeks   • Osteoarthritis    • Peripheral vascular disease (CMS/HCC)    • Right bundle branch block with left anterior fascicular block    • Syncope    • Thyrotoxicosis factitia    • Type 2 diabetes mellitus (CMS/HCC)      Family History   Problem Relation Age of Onset   • Coronary artery disease Other      Social History     Social History   • Marital status:      Spouse name: N/A   • Number of children: N/A   • Years of education: N/A     Occupational History   • Not on file.     Social History Main Topics   • Smoking status: Former Smoker   • Smokeless tobacco: Never Used   • Alcohol use No      Comment: caffeine use   • Drug use: Unknown   • Sexual activity: Not on file     Other Topics Concern   • Not on file     Social History Narrative   • No narrative on file     No Known Allergies    Current Outpatient Prescriptions:   •  ALPRAZolam (XANAX) 0.5 MG tablet, Take 0.5 mg by mouth 2 (Two) Times a Day., Disp: , Rfl:   •  aspirin 81 MG EC tablet, Take 1 tablet by mouth daily., Disp: , Rfl:   •  atorvastatin (LIPITOR) 40 MG tablet, Take 1 tablet by mouth Daily., Disp: 90 tablet, Rfl: 2  •  CALCIUM PO, Take 1 tablet by mouth daily., Disp: , Rfl:   •  carvedilol (COREG) 6.25 MG tablet, TAKE ONE TABLET BY MOUTH TWICE A DAY WITH MEALS, Disp: 180 tablet, Rfl: 2  •   "Cholecalciferol (VITAMIN D3) 2000 units tablet, Take  by mouth., Disp: , Rfl:   •  clopidogrel (PLAVIX) 75 MG tablet, Take 1 tablet by mouth daily., Disp: , Rfl:   •  DULoxetine (CYMBALTA) 30 MG capsule, Take by mouth., Disp: , Rfl:   •  DULoxetine (CYMBALTA) 60 MG capsule, , Disp: , Rfl:   •  glimepiride (AMARYL) 2 MG tablet, TAKE ONE TABLET BY MOUTH DAILY AS DIRECTED, Disp: 90 tablet, Rfl: 0  •  glucose blood (KIMBERLY CONTOUR TEST) test strip, Kimberly Contour Test In Vitro Strip; Patient Sig: Kimberly Contour Test In Vitro Strip ; 100; 0; 06-May-2012; Active, Disp: , Rfl:   •  Insulin Pen Needle (NOVOFINE) 32G X 6 MM misc, , Disp: , Rfl:   •  levothyroxine (SYNTHROID, LEVOTHROID) 175 MCG tablet, Take 1 tablet by mouth Daily., Disp: 90 tablet, Rfl: 1  •  lisinopril (PRINIVIL,ZESTRIL) 5 MG tablet, Take 1 tablet by mouth Daily., Disp: 90 tablet, Rfl: 3  •  Magnesium 100 MG capsule, Take  by mouth Daily., Disp: , Rfl:   •  metFORMIN (GLUCOPHAGE) 1000 MG tablet, TAKE ONE TABLET BY MOUTH TWICE A DAY, Disp: 180 tablet, Rfl: 0  •  Multiple Vitamin (MULTI-DAY VITAMINS PO), Take  by mouth daily., Disp: , Rfl:   •  Syringe/Needle, Disp, (B-D SYRINGE/NEEDLE 3CC/22GX1.5) 22G X 1-1/2\" 3 ML misc, , Disp: , Rfl:   •  Testosterone Cypionate (DEPOTESTOTERONE CYPIONATE) 200 MG/ML injection, INJECT 0.5 ML INTRAMUSCULARLY EVERY 10 DAYS, Disp: 2 mL, Rfl: 5  •  traZODone (DESYREL) 50 MG tablet, Take 50 mg by mouth., Disp: , Rfl:   •  VICTOZA 18 MG/3ML solution pen-injector injection, DIAL AND INJECT SUBCUTANEOUSLY 1.8MG DAILY, Disp: 3 pen, Rfl: 1  •  Zinc 100 MG tablet, Take 1 tablet by mouth daily., Disp: , Rfl:         Review of Systems   Constitutional: Negative for chills, fatigue and fever.   Cardiovascular: Negative for chest pain and palpitations.   Gastrointestinal: Negative for abdominal pain, constipation, diarrhea, nausea and vomiting.   Endocrine: Negative for cold intolerance and heat intolerance.   All other systems reviewed and " "are negative.      Objective       Vitals:    10/16/18 1020   BP: 118/72   Pulse: 72   Weight: 90 kg (198 lb 6.4 oz)   Height: 180.3 cm (71\")     Body mass index is 27.67 kg/m².      Physical Exam   Constitutional: He is oriented to person, place, and time. He appears well-developed and well-nourished.   HENT:   Head: Normocephalic and atraumatic.   Eyes: Pupils are equal, round, and reactive to light. EOM are normal.   Neck: Normal range of motion. Neck supple. No tracheal deviation present. No thyromegaly present.   Cardiovascular: Normal rate, regular rhythm, normal heart sounds and intact distal pulses.    Pulmonary/Chest: Effort normal and breath sounds normal.   Abdominal: Soft. Bowel sounds are normal. He exhibits no distension. There is no tenderness.   Musculoskeletal: Normal range of motion. He exhibits no edema.   Neurological: He is alert and oriented to person, place, and time. He has normal reflexes.   Skin: Skin is warm and dry. No rash noted.   Psychiatric: He has a normal mood and affect. His behavior is normal.   Nursing note and vitals reviewed.    Results Review:     I reviewed the patient's new clinical results.    Medical records reviewed  Summary:      Lab on 10/08/2018   Component Date Value Ref Range Status   • Glucose 10/08/2018 169* 65 - 99 mg/dL Final   • BUN 10/08/2018 15  8 - 23 mg/dL Final   • Creatinine 10/08/2018 1.10  0.76 - 1.27 mg/dL Final   • eGFR Non  Am 10/08/2018 66  >60 mL/min/1.73 Final   • eGFR African Am 10/08/2018 80  >60 mL/min/1.73 Final   • BUN/Creatinine Ratio 10/08/2018 13.6  7.0 - 25.0 Final   • Sodium 10/08/2018 143  136 - 145 mmol/L Final   • Potassium 10/08/2018 5.2  3.5 - 5.2 mmol/L Final   • Chloride 10/08/2018 104  98 - 107 mmol/L Final   • Total CO2 10/08/2018 29.0  22.0 - 29.0 mmol/L Final   • Calcium 10/08/2018 9.5  8.6 - 10.5 mg/dL Final   • Total Protein 10/08/2018 6.7  6.0 - 8.5 g/dL Final   • Albumin 10/08/2018 4.20  3.50 - 5.20 g/dL Final   • " Globulin 10/08/2018 2.5  gm/dL Final   • A/G Ratio 10/08/2018 1.7  g/dL Final   • Total Bilirubin 10/08/2018 0.3  0.1 - 1.2 mg/dL Final   • Alkaline Phosphatase 10/08/2018 70  39 - 117 U/L Final   • AST (SGOT) 10/08/2018 19  1 - 40 U/L Final   • ALT (SGPT) 10/08/2018 30  1 - 41 U/L Final   • Hemoglobin A1C 10/08/2018 7.32* 4.80 - 5.60 % Final    Comment: Hemoglobin A1C Ranges:  Increased Risk for Diabetes  5.7% to 6.4%  Diabetes                     >= 6.5%  Diabetic Goal                < 7.0%     • Free T4 10/08/2018 1.77* 0.93 - 1.70 ng/dL Final   • TSH 10/08/2018 1.100  0.270 - 4.200 mIU/mL Final   • Testosterone, Total 10/08/2018 255* 264 - 916 ng/dL Final    Comment: Adult male reference interval is based on a population of  healthy nonobese males (BMI <30) between 19 and 39 years old.  Abilio et.al. JCEM 2017,102;2325-8751. PMID: [id number].     • Testosterone, Free 10/08/2018 6.1* 6.6 - 18.1 pg/mL Final   • Sex Hormone Binding Globulin 10/08/2018 34.4  19.3 - 76.4 nmol/L Final   • Total Cholesterol 10/08/2018 127  0 - 200 mg/dL Final   • Triglycerides 10/08/2018 148  0 - 150 mg/dL Final   • HDL Cholesterol 10/08/2018 37* 40 - 60 mg/dL Final   • VLDL Cholesterol 10/08/2018 29.6  5 - 40 mg/dL Final   • LDL Cholesterol  10/08/2018 60  0 - 100 mg/dL Final   • Creatinine, Urine 10/08/2018 84.7  Not Estab. mg/dL Final   • Microalbumin, Urine 10/08/2018 5.2  Not Estab. ug/mL Final   • Microalbumin/Creatinine Ratio 10/08/2018 6.1  0.0 - 30.0 mg/g creat Final    Comment:                      Normal:                0.0 -  30.0                       Albuminuria:          31.0 - 300.0                       Clinical albuminuria:       >300.0     • Interpretation 10/08/2018 Note   Final    Supplemental report is available.   • PDF Image 10/08/2018 Not applicable   Final     Lab Results   Component Value Date    HGBA1C 7.32 (H) 10/08/2018    HGBA1C 7.5 (H) 08/21/2018    HGBA1C 8.4 (H) 07/16/2018     Lab Results   Component  Value Date    MICROALBUR 5.2 10/08/2018    CREATININE 1.10 10/08/2018     Imaging Results (most recent)     None          Assessment and Plan:    Spencer was seen today for diabetes, hypogonadism and hypothyroidism.    Diagnoses and all orders for this visit:    Uncontrolled type 2 diabetes mellitus with complication, without long-term current use of insulin (CMS/[facility])    Postablative hypothyroidism    Graves disease    Hypogonadism in male    Hyperinsulinism    Hyperlipidemia associated with type 2 diabetes mellitus (CMS/[facility])      #1 uncontrolled type II diabetes mellitus.  #2 post ablative hypothyroidism; stable and no change in the dosage  #3 Graves' disease With ophthalmopathy  #4 hypogonadism- patient is currently on testosterone 0.5 cc injections every 2 weeks  #5 hyperlipidemia  #6 insulin management; not on insulin currently. He is only taking victoza and Amaryl and metformin  #7 hypoglycemia;     Patient's glucometer readings reviewed  Majority are in the 100-180 range  Patient's hemoglobin A1c came down slightly to 7.3% still an improvement in 3 weeks from 7.5% before  Patient denies any hypoglycemia  His currently taking levothyroxine 175 µg daily.  He reports compliance with the medication    Patient is also taking Amaryl 2 mg daily and victoza 1.8 mg daily  Patient reports that his PET scan was considered negative and possibly a scar in the lung  Patient has follow-up CT scan next month    Testosterone levels trough level was 255 and the peak level was 680  I advised the patient to continue the current regimen of 0.5 cc injections every 10 days  Patient will return to follow-up in 3 months with lab tests    The total time spent  was more than 25 min of which greater than 15 min of time (greater than 50% of the total time)  was spent face to face with the patient counseling and coordination of care on recommended evaluation and treatment options, instructions for management/treatment and /or follow up and  "importance of compliance with chosen management or treatment options     Curt Brown MD. FACE    10/16/18      EMR Dragon / transcription disclaimer:     \"Dictated utilizing Dragon dictation\".         "

## 2018-10-19 DIAGNOSIS — IMO0002 UNCONTROLLED TYPE 2 DIABETES MELLITUS WITH COMPLICATION, WITHOUT LONG-TERM CURRENT USE OF INSULIN: ICD-10-CM

## 2018-10-20 RX ORDER — LIRAGLUTIDE 6 MG/ML
INJECTION SUBCUTANEOUS
Qty: 9 ML | Refills: 1 | Status: SHIPPED | OUTPATIENT
Start: 2018-10-20 | End: 2019-01-02 | Stop reason: SDUPTHER

## 2018-12-10 RX ORDER — GLIMEPIRIDE 2 MG/1
TABLET ORAL
Qty: 90 TABLET | Refills: 0 | Status: SHIPPED | OUTPATIENT
Start: 2018-12-10 | End: 2019-02-07

## 2019-01-02 DIAGNOSIS — IMO0002 UNCONTROLLED TYPE 2 DIABETES MELLITUS WITH COMPLICATION, WITHOUT LONG-TERM CURRENT USE OF INSULIN: ICD-10-CM

## 2019-01-03 RX ORDER — LIRAGLUTIDE 6 MG/ML
INJECTION SUBCUTANEOUS
Qty: 9 PEN | Refills: 1 | Status: SHIPPED | OUTPATIENT
Start: 2019-01-03 | End: 2019-02-07

## 2019-01-07 ENCOUNTER — PATIENT MESSAGE (OUTPATIENT)
Dept: CARDIOLOGY | Facility: CLINIC | Age: 69
End: 2019-01-07

## 2019-01-08 ENCOUNTER — PATIENT MESSAGE (OUTPATIENT)
Dept: CARDIOLOGY | Facility: CLINIC | Age: 69
End: 2019-01-08

## 2019-01-08 ENCOUNTER — TELEPHONE (OUTPATIENT)
Dept: CARDIOLOGY | Facility: CLINIC | Age: 69
End: 2019-01-08

## 2019-01-08 ENCOUNTER — LAB (OUTPATIENT)
Dept: ENDOCRINOLOGY | Age: 69
End: 2019-01-08

## 2019-01-08 DIAGNOSIS — E89.0 POSTABLATIVE HYPOTHYROIDISM: ICD-10-CM

## 2019-01-08 DIAGNOSIS — IMO0002 UNCONTROLLED TYPE 2 DIABETES MELLITUS WITH COMPLICATION, WITHOUT LONG-TERM CURRENT USE OF INSULIN: Primary | ICD-10-CM

## 2019-01-08 DIAGNOSIS — E78.5 HYPERLIPIDEMIA ASSOCIATED WITH TYPE 2 DIABETES MELLITUS (HCC): ICD-10-CM

## 2019-01-08 DIAGNOSIS — E11.69 HYPERLIPIDEMIA ASSOCIATED WITH TYPE 2 DIABETES MELLITUS (HCC): ICD-10-CM

## 2019-01-08 DIAGNOSIS — E29.1 HYPOGONADISM IN MALE: ICD-10-CM

## 2019-01-08 DIAGNOSIS — IMO0002 UNCONTROLLED TYPE 2 DIABETES MELLITUS WITH COMPLICATION, WITHOUT LONG-TERM CURRENT USE OF INSULIN: ICD-10-CM

## 2019-01-08 NOTE — TELEPHONE ENCOUNTER
From: Spencer Sinha  To: Argentina Prieto MD  Sent: 1/7/2019 2:04 PM EST  Subject: Test Results Question    I've had several CT Scans and a PET for a nodule in my right lung.  The scans were performed at [facility] (8/29/18, 9/11/18, and 12/11/18).  Noted in the Test Results were consistent reports of atherosclerotic disease of the coronary arteries and aorta.  Can you please look into these observations and let me know what I need to do to address these situations?

## 2019-01-08 NOTE — TELEPHONE ENCOUNTER
Regarding: FW: Test Results Question  Contact: [phone]    Called and left message to get CT on CDs for me to evaluate.     RM    ----- Message -----  From: Sydney Rojas MA  Sent: 1/8/2019   9:45 AM  To: Argentina Prieto MD  Subject: Test Results Question                            ----- Message from Sydney Rojas MA sent at 1/8/2019  9:45 AM EST -----       ----- Message from Spencer Sinha to Argentina Prieto MD sent at 1/7/2019  2:04 PM -----   I've had several CT Scans and a PET for a nodule in my right lung.  The scans were performed at [facility] (8/29/18, 9/11/18, and 12/11/18).  Noted in the Test Results were consistent reports of atherosclerotic disease of the coronary arteries and aorta.  Can you please look into these observations and let me know what I need to do to address these situations?

## 2019-01-09 NOTE — TELEPHONE ENCOUNTER
From: Spencer Sinha  To: Argentina Prieto MD  Sent: 1/8/2019 6:27 PM EST  Subject: Test Results Question    I am sorry I missed your call today around 4.  I was closed up in the bathroom grooming our dogs from 3:30 to 5:30.  I sent you a message yesterday to alert you to some of the results and images related to my Cardiologist system from 8/29 through 12/20.  Let me know either way.   I have an appointment April 10th with JACK Paez.

## 2019-01-10 LAB
ALBUMIN SERPL-MCNC: 4.3 G/DL (ref 3.5–5.2)
ALBUMIN/CREAT UR: 7.7 MG/G CREAT (ref 0–30)
ALBUMIN/GLOB SERPL: 1.4 G/DL
ALP SERPL-CCNC: 76 U/L (ref 39–117)
ALT SERPL-CCNC: 34 U/L (ref 1–41)
AST SERPL-CCNC: 23 U/L (ref 1–40)
BILIRUB SERPL-MCNC: 0.6 MG/DL (ref 0.1–1.2)
BUN SERPL-MCNC: 18 MG/DL (ref 8–23)
BUN/CREAT SERPL: 15.7 (ref 7–25)
CALCIUM SERPL-MCNC: 10 MG/DL (ref 8.6–10.5)
CHLORIDE SERPL-SCNC: 101 MMOL/L (ref 98–107)
CHOLEST SERPL-MCNC: 129 MG/DL (ref 0–200)
CO2 SERPL-SCNC: 26.1 MMOL/L (ref 22–29)
CREAT SERPL-MCNC: 1.15 MG/DL (ref 0.76–1.27)
CREAT UR-MCNC: 157.8 MG/DL
GLOBULIN SER CALC-MCNC: 3 GM/DL
GLUCOSE SERPL-MCNC: 174 MG/DL (ref 65–99)
HBA1C MFR BLD: 7.8 % (ref 4.8–5.6)
HDLC SERPL-MCNC: 36 MG/DL (ref 40–60)
INTERPRETATION: NORMAL
LDLC SERPL CALC-MCNC: 60 MG/DL (ref 0–100)
Lab: NORMAL
MICROALBUMIN UR-MCNC: 12.2 UG/ML
POTASSIUM SERPL-SCNC: 5 MMOL/L (ref 3.5–5.2)
PROT SERPL-MCNC: 7.3 G/DL (ref 6–8.5)
SHBG SERPL-SCNC: 36.4 NMOL/L (ref 19.3–76.4)
SODIUM SERPL-SCNC: 139 MMOL/L (ref 136–145)
T4 FREE SERPL-MCNC: 1.99 NG/DL (ref 0.93–1.7)
TESTOST FREE SERPL-MCNC: 8.3 PG/ML (ref 6.6–18.1)
TESTOST SERPL-MCNC: 375 NG/DL (ref 264–916)
TRIGL SERPL-MCNC: 163 MG/DL (ref 0–150)
TSH SERPL DL<=0.005 MIU/L-ACNC: 1.13 MIU/ML (ref 0.27–4.2)
VLDLC SERPL CALC-MCNC: 32.6 MG/DL (ref 5–40)

## 2019-01-16 ENCOUNTER — PATIENT MESSAGE (OUTPATIENT)
Dept: CARDIOLOGY | Facility: CLINIC | Age: 69
End: 2019-01-16

## 2019-01-16 NOTE — TELEPHONE ENCOUNTER
From: Spencer Sinha  To: Argentina Prieto MD  Sent: 1/16/2019 3:38 PM EST  Subject: Test Results Question    I dropped off CD'S of my recent scans at [facility] on 1/10.   Please advise if Dr. Prieto needs to see me sooner than April.    ----- Message -----  From: ALVARO JOSE  Sent: 1/9/2019 12:20 PM EST  To: Spencer Sinha  Subject: RE: Test Results Question  You needs to get the CD of images from [facility] for me to evaluate and then just drop the CD at the office per Dr Prieto......Sydney JOSE.      ----- Message -----   From: Spencer Sinha   Sent: 1/8/2019 6:27 PM EST   To: Argentina Prieto MD  Subject: Test Results Question    I am sorry I missed your call today around 4.  I was closed up in the bathroom grooming our dogs from 3:30 to 5:30.  I sent you a message yesterday to alert you to some of the results and images related to my Cardiologist system from 8/29 through 12/20.  Let me know either way.   I have an appointment April 10th with JACK Paez.

## 2019-01-18 ENCOUNTER — TELEPHONE (OUTPATIENT)
Dept: CARDIOLOGY | Facility: CLINIC | Age: 69
End: 2019-01-18

## 2019-01-18 DIAGNOSIS — I25.10 CORONARY ARTERY DISEASE INVOLVING NATIVE CORONARY ARTERY OF NATIVE HEART, ANGINA PRESENCE UNSPECIFIED: ICD-10-CM

## 2019-01-18 DIAGNOSIS — R06.09 DYSPNEA ON EFFORT: Primary | ICD-10-CM

## 2019-01-22 ENCOUNTER — OFFICE VISIT (OUTPATIENT)
Dept: ENDOCRINOLOGY | Age: 69
End: 2019-01-22

## 2019-01-22 VITALS
WEIGHT: 203.8 LBS | SYSTOLIC BLOOD PRESSURE: 122 MMHG | HEART RATE: 78 BPM | BODY MASS INDEX: 28.53 KG/M2 | HEIGHT: 71 IN | DIASTOLIC BLOOD PRESSURE: 80 MMHG

## 2019-01-22 DIAGNOSIS — E16.1 HYPERINSULINISM: ICD-10-CM

## 2019-01-22 DIAGNOSIS — IMO0002 UNCONTROLLED TYPE 2 DIABETES MELLITUS WITH COMPLICATION, WITHOUT LONG-TERM CURRENT USE OF INSULIN: Primary | ICD-10-CM

## 2019-01-22 DIAGNOSIS — E78.5 HYPERLIPIDEMIA ASSOCIATED WITH TYPE 2 DIABETES MELLITUS (HCC): ICD-10-CM

## 2019-01-22 DIAGNOSIS — E89.0 POSTABLATIVE HYPOTHYROIDISM: ICD-10-CM

## 2019-01-22 DIAGNOSIS — E29.1 HYPOGONADISM IN MALE: ICD-10-CM

## 2019-01-22 DIAGNOSIS — E11.69 HYPERLIPIDEMIA ASSOCIATED WITH TYPE 2 DIABETES MELLITUS (HCC): ICD-10-CM

## 2019-01-22 DIAGNOSIS — E05.00 GRAVES DISEASE: ICD-10-CM

## 2019-01-22 PROCEDURE — 99214 OFFICE O/P EST MOD 30 MIN: CPT | Performed by: INTERNAL MEDICINE

## 2019-01-22 NOTE — PROGRESS NOTES
68 y.o.    Patient Care Team:  Phil Gilliam MD as PCP - General (Internal Medicine)    Chief Complaint:         F/U TYPE 2 DIABETES, UNCONTROLLED.  POSTABLATIVE HYPOTHYROIDISM.  HYPOGONADISM MALE.  HERE TO DISCUSS LAB RESULTS  Subjective     HPI   Patient is a 68-year-old white male with a past history of post ablative hypothyroidism, Graves' disease, uncontrolled type 2 diabetes mellitus and hypogonadism came for follow-up    Post ablative hypothyroidism  Patient is currently taking levothyroxine 175 µg daily.  He reports compliance with the medication  Denies any side effects  He takes the medication early morning daily  Uncontrolled type 2 diabetes mellitus  Patient's recent hemoglobin A1c 7.9%  Most of his blood sugars are in the 100-200 range  Patient is currently taking victoza 1.8 mg daily along with metformin 1000 mg twice daily and glimepiride 2 mg daily in the morning  Hypoglycemia  Patient reports occasional hypoglycemia especially when physically active  Graves' disease  Patient is ophthalmopathy and has regular follow-up with ophthalmologist for the double vision which he experiences intermittently  Hypogonadism  Patient is currently taking testosterone injections 0.5 ML every 10 days as recommended  He denies any side effects  He reports compliance with the medication  He denies any symptoms or knowledge of diabetic retinopathy or neuropathy or nephropathy  Patient reported that he had a biopsy of the lung nodule and was not cancer  He is seeing cardiothoracic surgery next month for evaluation      Interval History    Hypothyroidism post ablative  Patient is compliant with his medication. He denied any side effects.he is currently on 200 micrograms Synthroid daily  Type II diabetes mellitus  Patient is currently taking Victoza 1.8 mg,, metformin 1000 mg twice daily.patient reports that his blood sugars are doing well. Usually averaging around 140; he also started Amaryl one tablet twice daily.  He did not take any insulin in the past 3 months.  hypoglycemia  Patient had a few episodes of hypoglycemia especially in the early morning hours in the late evening hours into the 56 and 61 range  Hypogonadism  patient is currently on testosterone injections 0.75 ml every 2 weeks and reports no side effects.  Last testosterone injection was approximately one week ago Friday  He'll get lab testing done today  Graves' disease with ophthalmopathy  Patient reported that he had significant problems finding an ophthalmologist to believe his symptoms of double vision in the fact that he had Graves' disease. He finally found and ophthalmologist at [facility] [place] Dr Yesi Vazquez who examined him and did a CT scan of the orbit and confirmed that he had a right lateral and left medial rectus enlargement on the CT scan confirming diplopia in the horizontal direction.  Patient does have a regular followup ware inith this ophthalmologist  Hyperlipidemia  Patient is currently on medication and denied any side effects.  Interval history  Patient reported that he just had back surgery approximately 3 weeks ago and currently has a back brace and walking without any Cane   Patient reported that he has been tolerating the testosterone injections 0.5 mL every 2 weeks  He reported most of his blood sugars are less than 150  He reports increasing stress at home from a 94-year-old mother living with them        The following portions of the patient's history were reviewed and updated as appropriate: allergies, current medications, past family history, past medical history, past social history, past surgical history and problem list.    Past Medical History:   Diagnosis Date   • Arteriosclerotic coronary artery disease    • Atrial fibrillation (CMS/HCC)    • Coronary artery disease    • Diabetes (CMS/HCC)     type II, mellitus   • Diplopia    • Discoid lupus erythematosus    • Graves disease    • History of echocardiogram      L ATRIUM ENLARGED, anterior myocardial infarction, lft axis deviation, abnormal ECG   • History of electrocardiogram     SHOWS NORMAL SINUS RHYTHM, ANTERIOR MYOCARDIAL INFARCTION, OLD LDEFT AXIS DEVIATION, LEFT ANTERIOR FASCICULAR BLOCK, ABNORMAL ECG   • Hyperinsulinism    • Hypertension    • Hypertensive heart disease    • Hypogonadism, male    • Hypothyroidism, postablative    • IBS (irritable bowel syndrome)    • Left ventricular hypertrophy    • Long-term insulin use (CMS/[facility])    • Metabolic disorder    • Myocardial infarction of inferolateral wall greater than eight weeks ago     of inferior wall, greater than 8 weeks   • Osteoarthritis    • Peripheral vascular disease (CMS/[facility])    • Right bundle branch block with left anterior fascicular block    • Syncope    • Thyrotoxicosis factitia    • Type 2 diabetes mellitus (CMS/[facility])      Family History   Problem Relation Age of Onset   • Coronary artery disease Other      Social History     Socioeconomic History   • Marital status:      Spouse name: Not on file   • Number of children: Not on file   • Years of education: Not on file   • Highest education level: Not on file   Social Needs   • Financial resource strain: Not on file   • Food insecurity - worry: Not on file   • Food insecurity - inability: Not on file   • Transportation needs - medical: Not on file   • Transportation needs - non-medical: Not on file   Occupational History   • Not on file   Tobacco Use   • Smoking status: Former Smoker   • Smokeless tobacco: Never Used   Substance and Sexual Activity   • Alcohol use: No     Comment: caffeine use   • Drug use: Not on file   • Sexual activity: Not on file   Other Topics Concern   • Not on file   Social History Narrative   • Not on file     No Known Allergies    Current Outpatient Medications:   •  ALPRAZolam (XANAX) 0.5 MG tablet, Take 0.5 mg by mouth 2 (Two) Times a Day., Disp: , Rfl:   •  aspirin 81 MG EC tablet, Take 1 tablet by mouth daily.,  "Disp: , Rfl:   •  atorvastatin (LIPITOR) 40 MG tablet, Take 1 tablet by mouth Daily., Disp: 90 tablet, Rfl: 2  •  CALCIUM PO, Take 1 tablet by mouth daily., Disp: , Rfl:   •  carvedilol (COREG) 6.25 MG tablet, TAKE ONE TABLET BY MOUTH TWICE A DAY WITH MEALS, Disp: 180 tablet, Rfl: 2  •  Cholecalciferol (VITAMIN D3) 2000 units tablet, Take  by mouth., Disp: , Rfl:   •  clopidogrel (PLAVIX) 75 MG tablet, Take 1 tablet by mouth daily., Disp: , Rfl:   •  DULoxetine (CYMBALTA) 30 MG capsule, Take by mouth., Disp: , Rfl:   •  DULoxetine (CYMBALTA) 60 MG capsule, , Disp: , Rfl:   •  glimepiride (AMARYL) 2 MG tablet, TAKE ONE TABLET BY MOUTH DAILY AS DIRECTED, Disp: 90 tablet, Rfl: 0  •  glucose blood (KIMBERLY CONTOUR TEST) test strip, Kimberly Contour Test In Vitro Strip; Patient Sig: Kimberly Contour Test In Vitro Strip ; 100; 0; 06-May-2012; Active, Disp: , Rfl:   •  Insulin Pen Needle (NOVOFINE) 32G X 6 MM misc, , Disp: , Rfl:   •  levothyroxine (SYNTHROID, LEVOTHROID) 175 MCG tablet, Take 1 tablet by mouth Daily., Disp: 90 tablet, Rfl: 1  •  lisinopril (PRINIVIL,ZESTRIL) 5 MG tablet, Take 1 tablet by mouth Daily., Disp: 90 tablet, Rfl: 3  •  Magnesium 100 MG capsule, Take  by mouth Daily., Disp: , Rfl:   •  metFORMIN (GLUCOPHAGE) 1000 MG tablet, TAKE ONE TABLET BY MOUTH TWICE A DAY, Disp: 180 tablet, Rfl: 0  •  Multiple Vitamin (MULTI-DAY VITAMINS PO), Take  by mouth daily., Disp: , Rfl:   •  Syringe/Needle, Disp, (B-D SYRINGE/NEEDLE 3CC/22GX1.5) 22G X 1-1/2\" 3 ML misc, , Disp: , Rfl:   •  Testosterone Cypionate (DEPOTESTOTERONE CYPIONATE) 200 MG/ML injection, INJECT 0.5 ML INTRAMUSCULARLY EVERY 10 DAYS, Disp: 2 mL, Rfl: 5  •  traZODone (DESYREL) 50 MG tablet, Take 50 mg by mouth., Disp: , Rfl:   •  VICTOZA 18 MG/3ML solution pen-injector injection, DIAL AND INJECT SUBCUTANEOUSLY 1.8MG DAILY, Disp: 9 pen, Rfl: 1  •  Zinc 100 MG tablet, Take 1 tablet by mouth daily., Disp: , Rfl:         Review of Systems   Constitutional: " "Negative for chills, fatigue and fever.   Cardiovascular: Negative for chest pain and palpitations.   Gastrointestinal: Negative for abdominal pain, constipation, diarrhea, nausea and vomiting.   Endocrine: Negative for cold intolerance and heat intolerance.   All other systems reviewed and are negative.      Objective       Vitals:    01/22/19 1206   BP: 122/80   Pulse: 78   Weight: 92.4 kg (203 lb 12.8 oz)   Height: 180.3 cm (71\")     Body mass index is 28.42 kg/m².      Physical Exam   Constitutional: He is oriented to person, place, and time. He appears well-developed and well-nourished.   HENT:   Head: Normocephalic and atraumatic.   Eyes: EOM are normal. Pupils are equal, round, and reactive to light.   Neck: Normal range of motion. Neck supple. No tracheal deviation present. No thyromegaly present.   Cardiovascular: Normal rate, regular rhythm, normal heart sounds and intact distal pulses.   Pulmonary/Chest: Effort normal and breath sounds normal.   Abdominal: Soft. Bowel sounds are normal. He exhibits no distension. There is no tenderness.   Musculoskeletal: Normal range of motion. He exhibits no edema.   Neurological: He is alert and oriented to person, place, and time. He has normal reflexes.   Skin: Skin is warm and dry. No rash noted.   Psychiatric: He has a normal mood and affect. His behavior is normal.   Nursing note and vitals reviewed.    Results Review:     I reviewed the patient's new clinical results.    Medical records reviewed  Summary:      Lab on 01/08/2019   Component Date Value Ref Range Status   • Total Cholesterol 01/08/2019 129  0 - 200 mg/dL Final   • Triglycerides 01/08/2019 163* 0 - 150 mg/dL Final   • HDL Cholesterol 01/08/2019 36* 40 - 60 mg/dL Final   • VLDL Cholesterol 01/08/2019 32.6  5 - 40 mg/dL Final   • LDL Cholesterol  01/08/2019 60  0 - 100 mg/dL Final   • Testosterone, Total 01/08/2019 375  264 - 916 ng/dL Final    Comment: Adult male reference interval is based on a " population of  healthy nonobese males (BMI <30) between 19 and 39 years old.  chaz [email or web address]. JCEM 2017,102;5498-6789. PMID: 56371710.     • Testosterone, Free 01/08/2019 8.3  6.6 - 18.1 pg/mL Final   • Sex Hormone Binding Globulin 01/08/2019 36.4  19.3 - 76.4 nmol/L Final   • TSH 01/08/2019 1.130  0.270 - 4.200 mIU/mL Final   • Free T4 01/08/2019 1.99* 0.93 - 1.70 ng/dL Final   • Hemoglobin A1C 01/08/2019 7.80* 4.80 - 5.60 % Final    Comment: Hemoglobin A1C Ranges:  Increased Risk for Diabetes  5.7% to 6.4%  Diabetes                     >= 6.5%  Diabetic Goal                < 7.0%     • Glucose 01/08/2019 174* 65 - 99 mg/dL Final   • BUN 01/08/2019 18  8 - 23 mg/dL Final   • Creatinine 01/08/2019 1.15  0.76 - 1.27 mg/dL Final   • eGFR Non  Am 01/08/2019 63  >60 mL/min/1.73 Final   • eGFR African Am 01/08/2019 76  >60 mL/min/1.73 Final   • BUN/Creatinine Ratio 01/08/2019 15.7  7.0 - 25.0 Final   • Sodium 01/08/2019 139  136 - 145 mmol/L Final   • Potassium 01/08/2019 5.0  3.5 - 5.2 mmol/L Final   • Chloride 01/08/2019 101  98 - 107 mmol/L Final   • Total CO2 01/08/2019 26.1  22.0 - 29.0 mmol/L Final   • Calcium 01/08/2019 10.0  8.6 - 10.5 mg/dL Final   • Total Protein 01/08/2019 7.3  6.0 - 8.5 g/dL Final   • Albumin 01/08/2019 4.30  3.50 - 5.20 g/dL Final   • Globulin 01/08/2019 3.0  gm/dL Final   • A/G Ratio 01/08/2019 1.4  g/dL Final   • Total Bilirubin 01/08/2019 0.6  0.1 - 1.2 mg/dL Final   • Alkaline Phosphatase 01/08/2019 76  39 - 117 U/L Final   • AST (SGOT) 01/08/2019 23  1 - 40 U/L Final   • ALT (SGPT) 01/08/2019 34  1 - 41 U/L Final   • Creatinine, Urine 01/08/2019 157.8  Not Estab. mg/dL Final   • Microalbumin, Urine 01/08/2019 12.2  Not Estab. ug/mL Final   • Microalbumin/Creatinine Ratio 01/08/2019 7.7  0.0 - 30.0 mg/g creat Final    Comment:                      Normal:                0.0 -  30.0                       Albuminuria:          31.0 - 300.0                       Clinical  albuminuria:       >300.0     • Interpretation 01/08/2019 Note   Final    Supplemental report is available.   • PDF Image 01/08/2019 Not applicable   Final     Lab Results   Component Value Date    HGBA1C 7.80 (H) 01/08/2019    HGBA1C 7.32 (H) 10/08/2018    HGBA1C 7.5 (H) 08/21/2018     Lab Results   Component Value Date    MICROALBUR 12.2 01/08/2019    CREATININE 1.15 01/08/2019     Imaging Results (most recent)     None                Assessment and Plan:    Spencer was seen today for hypothyroidism, diabetes and hypogonadism.    Diagnoses and all orders for this visit:    Uncontrolled type 2 diabetes mellitus with complication, without long-term current use of insulin (CMS/[facility])    Postablative hypothyroidism    Hypogonadism in male    Graves disease    Hyperinsulinism    Hyperlipidemia associated with type 2 diabetes mellitus (CMS/[facility])       #1 uncontrolled type II diabetes mellitus.  #2 post ablative hypothyroidism; stable and no change in the dosage  #3 Graves' disease With ophthalmopathy  #4 hypogonadism- patient is currently on testosterone 0.5 cc injections every 2 weeks  #5 hyperlipidemia  #6 insulin management; not on insulin currently. He is only taking victoza and Amaryl and metformin  #7 hypoglycemia;      Patient's glucometer readings reviewed  Majority of the blood sugars are in the 100-200 range  Patient has no hypoglycemia  His currently taking levothyroxine 175 µg daily.  He is also taking Amaryl 2 mg daily in the morning and victoza 1.8 mg daily    Testosterone levels trough level was 255 and the peak level was 680  I advised the patient to continue the current regimen of 0.5 cc injections every 10 days  Patient will return to follow-up in 3 months with lab tests    He reported that the lung nodule biopsy was not cancer   Hemoglobin A1c went up to 7.8%  Patient is planning to get back to exercise and signed up to [employer]  Patient will return to follow-up in 3-4 months     The total time  "spent  was more than 25 min of which greater than 15 min of time (greater than 50% of the total time)  was spent face to face with the patient counseling and coordination of care on recommended evaluation and treatment options, instructions for management/treatment and /or follow up and importance of compliance with chosen management or treatment options    Curt Brown MD. FACE    01/22/19      EMR Dragon / transcription disclaimer:     \"Dictated utilizing Dragon dictation\".         "

## 2019-01-30 ENCOUNTER — TRANSCRIBE ORDERS (OUTPATIENT)
Dept: ADMINISTRATIVE | Facility: HOSPITAL | Age: 69
End: 2019-01-30

## 2019-01-30 ENCOUNTER — LAB (OUTPATIENT)
Dept: LAB | Facility: HOSPITAL | Age: 69
End: 2019-01-30
Attending: INTERNAL MEDICINE

## 2019-01-30 ENCOUNTER — HOSPITAL ENCOUNTER (OUTPATIENT)
Dept: CARDIOLOGY | Facility: HOSPITAL | Age: 69
Discharge: HOME | End: 2019-01-30
Attending: INTERNAL MEDICINE | Admitting: INTERNAL MEDICINE

## 2019-01-30 VITALS — WEIGHT: 203 LBS | BODY MASS INDEX: 28.42 KG/M2 | HEIGHT: 71 IN

## 2019-01-30 DIAGNOSIS — Z01.810 PRE-OPERATIVE CARDIOVASCULAR EXAMINATION: Primary | ICD-10-CM

## 2019-01-30 DIAGNOSIS — Z13.6 SCREENING FOR ISCHEMIC HEART DISEASE: ICD-10-CM

## 2019-01-30 DIAGNOSIS — R94.39 ABNORMAL NUCLEAR STRESS TEST: ICD-10-CM

## 2019-01-30 DIAGNOSIS — R06.09 DYSPNEA ON EFFORT: ICD-10-CM

## 2019-01-30 DIAGNOSIS — I20.9 ANGINA PECTORIS (HCC): Primary | ICD-10-CM

## 2019-01-30 DIAGNOSIS — I25.10 CORONARY ARTERY DISEASE INVOLVING NATIVE CORONARY ARTERY OF NATIVE HEART, ANGINA PRESENCE UNSPECIFIED: ICD-10-CM

## 2019-01-30 DIAGNOSIS — Z01.810 PRE-OPERATIVE CARDIOVASCULAR EXAMINATION: ICD-10-CM

## 2019-01-30 LAB
ANION GAP SERPL CALCULATED.3IONS-SCNC: 13.6 MMOL/L
BASOPHILS # BLD AUTO: 0.06 10*3/MM3 (ref 0–0.2)
BASOPHILS NFR BLD AUTO: 0.7 % (ref 0–1.5)
BH CV NUCLEAR PRIOR STUDY: 3
BH CV STRESS BP STAGE 1: NORMAL
BH CV STRESS BP STAGE 2: NORMAL
BH CV STRESS DURATION MIN STAGE 1: 3
BH CV STRESS DURATION MIN STAGE 2: 3
BH CV STRESS DURATION MIN STAGE 3: 1
BH CV STRESS DURATION SEC STAGE 1: 0
BH CV STRESS DURATION SEC STAGE 2: 0
BH CV STRESS DURATION SEC STAGE 3: 0
BH CV STRESS GRADE STAGE 1: 10
BH CV STRESS GRADE STAGE 2: 12
BH CV STRESS GRADE STAGE 3: 14
BH CV STRESS HR STAGE 1: 103
BH CV STRESS HR STAGE 2: 122
BH CV STRESS HR STAGE 3: 124
BH CV STRESS METS STAGE 1: 5
BH CV STRESS METS STAGE 2: 7.5
BH CV STRESS METS STAGE 3: 10
BH CV STRESS PROTOCOL 1: NORMAL
BH CV STRESS PROTOCOL 2 BP STAGE 1: NORMAL
BH CV STRESS PROTOCOL 2 COMMENTS STAGE 1: NORMAL
BH CV STRESS PROTOCOL 2 DOSE REGADENOSON STAGE 1: 0.4
BH CV STRESS PROTOCOL 2 DURATION MIN STAGE 1: 0
BH CV STRESS PROTOCOL 2 DURATION SEC STAGE 1: 10
BH CV STRESS PROTOCOL 2 HR STAGE 1: 125
BH CV STRESS PROTOCOL 2 STAGE 1: 1
BH CV STRESS PROTOCOL 2: NORMAL
BH CV STRESS RECOVERY BP: NORMAL MMHG
BH CV STRESS RECOVERY HR: 90 BPM
BH CV STRESS SPEED STAGE 1: 1.7
BH CV STRESS SPEED STAGE 2: 2.5
BH CV STRESS SPEED STAGE 3: 3.4
BH CV STRESS STAGE 1: 1
BH CV STRESS STAGE 2: 2
BH CV STRESS STAGE 3: 3
BUN BLD-MCNC: 19 MG/DL (ref 8–23)
BUN/CREAT SERPL: 17.1 (ref 7–25)
CALCIUM SPEC-SCNC: 9.4 MG/DL (ref 8.6–10.5)
CHLORIDE SERPL-SCNC: 98 MMOL/L (ref 98–107)
CO2 SERPL-SCNC: 27.4 MMOL/L (ref 22–29)
CREAT BLD-MCNC: 1.11 MG/DL (ref 0.76–1.27)
DEPRECATED RDW RBC AUTO: 44.4 FL (ref 37–54)
EOSINOPHIL # BLD AUTO: 0.19 10*3/MM3 (ref 0–0.7)
EOSINOPHIL NFR BLD AUTO: 2.3 % (ref 0.3–6.2)
ERYTHROCYTE [DISTWIDTH] IN BLOOD BY AUTOMATED COUNT: 13.8 % (ref 11.5–14.5)
GFR SERPL CREATININE-BSD FRML MDRD: 66 ML/MIN/1.73
GLUCOSE BLD-MCNC: 220 MG/DL (ref 65–99)
HCT VFR BLD AUTO: 50 % (ref 40.4–52.2)
HGB BLD-MCNC: 16.2 G/DL (ref 13.7–17.6)
IMM GRANULOCYTES # BLD AUTO: 0 10*3/MM3 (ref 0–0.03)
IMM GRANULOCYTES NFR BLD AUTO: 0 % (ref 0–0.5)
LV EF NUC BP: 51 %
LYMPHOCYTES # BLD AUTO: 1.54 10*3/MM3 (ref 0.9–4.8)
LYMPHOCYTES NFR BLD AUTO: 18.3 % (ref 19.6–45.3)
MAXIMAL PREDICTED HEART RATE: 151 BPM
MCH RBC QN AUTO: 28.7 PG (ref 27–32.7)
MCHC RBC AUTO-ENTMCNC: 32.4 G/DL (ref 32.6–36.4)
MCV RBC AUTO: 88.5 FL (ref 79.8–96.2)
MONOCYTES # BLD AUTO: 0.59 10*3/MM3 (ref 0.2–1.2)
MONOCYTES NFR BLD AUTO: 7 % (ref 5–12)
NEUTROPHILS # BLD AUTO: 6.02 10*3/MM3 (ref 1.9–8.1)
NEUTROPHILS NFR BLD AUTO: 71.7 % (ref 42.7–76)
PERCENT MAX PREDICTED HR: 82.78 %
PLATELET # BLD AUTO: 195 10*3/MM3 (ref 140–500)
PMV BLD AUTO: 10 FL (ref 6–12)
POTASSIUM BLD-SCNC: 4.4 MMOL/L (ref 3.5–5.2)
RBC # BLD AUTO: 5.65 10*6/MM3 (ref 4.6–6)
SODIUM BLD-SCNC: 139 MMOL/L (ref 136–145)
STRESS BASELINE BP: NORMAL MMHG
STRESS BASELINE HR: 78 BPM
STRESS PERCENT HR: 97 %
STRESS POST EXERCISE DUR MIN: 7 MIN
STRESS POST PEAK BP: NORMAL MMHG
STRESS POST PEAK HR: 125 BPM
STRESS TARGET HR: 128 BPM
WBC NRBC COR # BLD: 8.4 10*3/MM3 (ref 4.5–10.7)

## 2019-01-30 PROCEDURE — 78452 HT MUSCLE IMAGE SPECT MULT: CPT

## 2019-01-30 PROCEDURE — 93016 CV STRESS TEST SUPVJ ONLY: CPT | Performed by: INTERNAL MEDICINE

## 2019-01-30 PROCEDURE — 36415 COLL VENOUS BLD VENIPUNCTURE: CPT

## 2019-01-30 PROCEDURE — 80048 BASIC METABOLIC PNL TOTAL CA: CPT

## 2019-01-30 PROCEDURE — 25010000002 REGADENOSON 0.4 MG/5ML SOLUTION: Performed by: INTERNAL MEDICINE

## 2019-01-30 PROCEDURE — 93018 CV STRESS TEST I&R ONLY: CPT | Performed by: INTERNAL MEDICINE

## 2019-01-30 PROCEDURE — 93017 CV STRESS TEST TRACING ONLY: CPT

## 2019-01-30 PROCEDURE — 78452 HT MUSCLE IMAGE SPECT MULT: CPT | Performed by: INTERNAL MEDICINE

## 2019-01-30 PROCEDURE — 85025 COMPLETE CBC W/AUTO DIFF WBC: CPT

## 2019-01-30 PROCEDURE — 0 TECHNETIUM TETROFOSMIN KIT: Performed by: INTERNAL MEDICINE

## 2019-01-30 PROCEDURE — A9502 TC99M TETROFOSMIN: HCPCS | Performed by: INTERNAL MEDICINE

## 2019-01-30 RX ADMIN — REGADENOSON 0.4 MG: 0.08 INJECTION, SOLUTION INTRAVENOUS at 10:05

## 2019-01-30 RX ADMIN — TETROFOSMIN 1 DOSE: 1.38 INJECTION, POWDER, LYOPHILIZED, FOR SOLUTION INTRAVENOUS at 08:55

## 2019-01-30 RX ADMIN — TETROFOSMIN 1 DOSE: 1.38 INJECTION, POWDER, LYOPHILIZED, FOR SOLUTION INTRAVENOUS at 10:05

## 2019-01-31 ENCOUNTER — HOSPITAL ENCOUNTER (OUTPATIENT)
Facility: HOSPITAL | Age: 69
Setting detail: HOSPITAL OUTPATIENT SURGERY
Discharge: HOME | End: 2019-01-31
Attending: INTERNAL MEDICINE | Admitting: INTERNAL MEDICINE

## 2019-01-31 VITALS
BODY MASS INDEX: 28 KG/M2 | DIASTOLIC BLOOD PRESSURE: 73 MMHG | OXYGEN SATURATION: 97 % | WEIGHT: 200 LBS | HEIGHT: 71 IN | RESPIRATION RATE: 18 BRPM | HEART RATE: 72 BPM | TEMPERATURE: 97 F | SYSTOLIC BLOOD PRESSURE: 129 MMHG

## 2019-01-31 DIAGNOSIS — I20.9 ANGINA PECTORIS (HCC): ICD-10-CM

## 2019-01-31 DIAGNOSIS — R94.39 ABNORMAL NUCLEAR STRESS TEST: ICD-10-CM

## 2019-01-31 LAB — GLUCOSE BLDC GLUCOMTR-MCNC: 166 MG/DL (ref 70–130)

## 2019-01-31 PROCEDURE — 25010000002 HEPARIN (PORCINE) PER 1000 UNITS: Performed by: INTERNAL MEDICINE

## 2019-01-31 PROCEDURE — 93458 L HRT ARTERY/VENTRICLE ANGIO: CPT | Performed by: INTERNAL MEDICINE

## 2019-01-31 PROCEDURE — 25010000002 FENTANYL CITRATE (PF) 100 MCG/2ML SOLUTION: Performed by: INTERNAL MEDICINE

## 2019-01-31 PROCEDURE — C1894 INTRO/SHEATH, NON-LASER: HCPCS | Performed by: INTERNAL MEDICINE

## 2019-01-31 PROCEDURE — C1769 GUIDE WIRE: HCPCS | Performed by: INTERNAL MEDICINE

## 2019-01-31 PROCEDURE — S0260 H&P FOR SURGERY: HCPCS | Performed by: INTERNAL MEDICINE

## 2019-01-31 PROCEDURE — 99204 OFFICE O/P NEW MOD 45 MIN: CPT | Performed by: THORACIC SURGERY (CARDIOTHORACIC VASCULAR SURGERY)

## 2019-01-31 PROCEDURE — 0 IOPAMIDOL PER 1 ML: Performed by: INTERNAL MEDICINE

## 2019-01-31 PROCEDURE — 25010000002 MIDAZOLAM PER 1 MG: Performed by: INTERNAL MEDICINE

## 2019-01-31 PROCEDURE — 82962 GLUCOSE BLOOD TEST: CPT

## 2019-01-31 RX ORDER — SODIUM CHLORIDE 0.9 % (FLUSH) 0.9 %
3-10 SYRINGE (ML) INJECTION AS NEEDED
Status: DISCONTINUED | OUTPATIENT
Start: 2019-01-31 | End: 2019-01-31 | Stop reason: HOSPADM

## 2019-01-31 RX ORDER — SODIUM CHLORIDE 0.9 % (FLUSH) 0.9 %
3 SYRINGE (ML) INJECTION EVERY 12 HOURS SCHEDULED
Status: DISCONTINUED | OUTPATIENT
Start: 2019-01-31 | End: 2019-01-31 | Stop reason: HOSPADM

## 2019-01-31 RX ORDER — SODIUM CHLORIDE 9 MG/ML
125 INJECTION, SOLUTION INTRAVENOUS CONTINUOUS
Status: DISCONTINUED | OUTPATIENT
Start: 2019-01-31 | End: 2019-01-31 | Stop reason: HOSPADM

## 2019-01-31 RX ORDER — FENTANYL CITRATE 50 UG/ML
INJECTION, SOLUTION INTRAMUSCULAR; INTRAVENOUS AS NEEDED
Status: DISCONTINUED | OUTPATIENT
Start: 2019-01-31 | End: 2019-01-31 | Stop reason: HOSPADM

## 2019-01-31 RX ORDER — MIDAZOLAM HYDROCHLORIDE 1 MG/ML
INJECTION INTRAMUSCULAR; INTRAVENOUS AS NEEDED
Status: DISCONTINUED | OUTPATIENT
Start: 2019-01-31 | End: 2019-01-31 | Stop reason: HOSPADM

## 2019-01-31 RX ORDER — LIDOCAINE HYDROCHLORIDE 20 MG/ML
INJECTION, SOLUTION INFILTRATION; PERINEURAL AS NEEDED
Status: DISCONTINUED | OUTPATIENT
Start: 2019-01-31 | End: 2019-01-31 | Stop reason: HOSPADM

## 2019-01-31 RX ORDER — SODIUM CHLORIDE 9 MG/ML
100 INJECTION, SOLUTION INTRAVENOUS CONTINUOUS
Status: DISCONTINUED | OUTPATIENT
Start: 2019-01-31 | End: 2019-01-31 | Stop reason: HOSPADM

## 2019-01-31 RX ORDER — LIDOCAINE HYDROCHLORIDE 10 MG/ML
0.1 INJECTION, SOLUTION EPIDURAL; INFILTRATION; INTRACAUDAL; PERINEURAL ONCE AS NEEDED
Status: DISCONTINUED | OUTPATIENT
Start: 2019-01-31 | End: 2019-01-31 | Stop reason: HOSPADM

## 2019-01-31 RX ADMIN — SODIUM CHLORIDE 125 ML/HR: 9 INJECTION, SOLUTION INTRAVENOUS at 09:23

## 2019-02-01 ENCOUNTER — PREP FOR SURGERY (OUTPATIENT)
Dept: OTHER | Facility: HOSPITAL | Age: 69
End: 2019-02-01

## 2019-02-01 ENCOUNTER — TELEPHONE (OUTPATIENT)
Dept: CARDIAC SURGERY | Facility: CLINIC | Age: 69
End: 2019-02-01

## 2019-02-01 DIAGNOSIS — I67.850 CEREBRAL AUTOSOMAL DOMINANT ARTERIOPATHY WITH SUBCORTICAL INFARCTS AND LEUKOENCEPHALOPATHY: ICD-10-CM

## 2019-02-01 DIAGNOSIS — I25.118 CORONARY ARTERY DISEASE OF NATIVE HEART WITH STABLE ANGINA PECTORIS, UNSPECIFIED VESSEL OR LESION TYPE (HCC): Primary | ICD-10-CM

## 2019-02-01 DIAGNOSIS — I13.0 HYPERTENSIVE HEART AND CHRONIC KIDNEY DISEASE WITH HEART FAILURE AND STAGE 1 THROUGH STAGE 4 CHRONIC KIDNEY DISEASE, OR CHRONIC KIDNEY DISEASE (HCC): ICD-10-CM

## 2019-02-01 DIAGNOSIS — R79.9 ABNORMAL FINDING OF BLOOD CHEMISTRY: ICD-10-CM

## 2019-02-01 DIAGNOSIS — R79.1 ABNORMAL COAGULATION PROFILE: ICD-10-CM

## 2019-02-01 RX ORDER — CHLORHEXIDINE GLUCONATE 0.12 MG/ML
15 RINSE ORAL ONCE
Status: CANCELLED | OUTPATIENT
Start: 2019-02-08 | End: 2019-02-01

## 2019-02-01 RX ORDER — CEFAZOLIN SODIUM 2 G/100ML
2 INJECTION, SOLUTION INTRAVENOUS
Status: CANCELLED | OUTPATIENT
Start: 2019-02-09 | End: 2019-02-10

## 2019-02-01 RX ORDER — CHLORHEXIDINE GLUCONATE 500 MG/1
1 CLOTH TOPICAL EVERY 12 HOURS PRN
Status: CANCELLED | OUTPATIENT
Start: 2019-02-01

## 2019-02-01 RX ORDER — CHLORHEXIDINE GLUCONATE 500 MG/1
1 CLOTH TOPICAL EVERY 12 HOURS PRN
Status: CANCELLED | OUTPATIENT
Start: 2019-02-08

## 2019-02-01 RX ORDER — CHLORHEXIDINE GLUCONATE 0.12 MG/ML
15 RINSE ORAL EVERY 12 HOURS
Status: CANCELLED | OUTPATIENT
Start: 2019-02-01 | End: 2019-02-02

## 2019-02-04 RX ORDER — LEVOTHYROXINE SODIUM 175 UG/1
TABLET ORAL
Qty: 90 TABLET | Refills: 0 | Status: SHIPPED | OUTPATIENT
Start: 2019-02-04 | End: 2019-02-07

## 2019-02-07 ENCOUNTER — HOSPITAL ENCOUNTER (OUTPATIENT)
Dept: RESPIRATORY THERAPY | Facility: HOSPITAL | Age: 69
Discharge: HOME | End: 2019-02-07
Attending: THORACIC SURGERY (CARDIOTHORACIC VASCULAR SURGERY)

## 2019-02-07 ENCOUNTER — HOSPITAL ENCOUNTER (OUTPATIENT)
Dept: GENERAL RADIOLOGY | Facility: HOSPITAL | Age: 69
Discharge: HOME | End: 2019-02-07

## 2019-02-07 ENCOUNTER — ANESTHESIA EVENT (OUTPATIENT)
Dept: PERIOP | Facility: HOSPITAL | Age: 69
End: 2019-02-07

## 2019-02-07 ENCOUNTER — HOSPITAL ENCOUNTER (OUTPATIENT)
Dept: CARDIOLOGY | Facility: HOSPITAL | Age: 69
Discharge: HOME | End: 2019-02-07

## 2019-02-07 ENCOUNTER — HOSPITAL ENCOUNTER (OUTPATIENT)
Dept: CARDIOLOGY | Facility: HOSPITAL | Age: 69
Discharge: HOME | End: 2019-02-07
Admitting: NURSE PRACTITIONER

## 2019-02-07 ENCOUNTER — APPOINTMENT (OUTPATIENT)
Dept: PREADMISSION TESTING | Facility: HOSPITAL | Age: 69
End: 2019-02-07

## 2019-02-07 VITALS
HEART RATE: 72 BPM | BODY MASS INDEX: 27.86 KG/M2 | SYSTOLIC BLOOD PRESSURE: 102 MMHG | DIASTOLIC BLOOD PRESSURE: 70 MMHG | TEMPERATURE: 97 F | HEIGHT: 71 IN | WEIGHT: 199 LBS | RESPIRATION RATE: 16 BRPM | OXYGEN SATURATION: 99 %

## 2019-02-07 DIAGNOSIS — R79.9 ABNORMAL FINDING OF BLOOD CHEMISTRY: ICD-10-CM

## 2019-02-07 DIAGNOSIS — I67.850 CEREBRAL AUTOSOMAL DOMINANT ARTERIOPATHY WITH SUBCORTICAL INFARCTS AND LEUKOENCEPHALOPATHY: ICD-10-CM

## 2019-02-07 DIAGNOSIS — I25.118 CORONARY ARTERY DISEASE OF NATIVE HEART WITH STABLE ANGINA PECTORIS, UNSPECIFIED VESSEL OR LESION TYPE (HCC): ICD-10-CM

## 2019-02-07 DIAGNOSIS — I13.0 HYPERTENSIVE HEART AND CHRONIC KIDNEY DISEASE WITH HEART FAILURE AND STAGE 1 THROUGH STAGE 4 CHRONIC KIDNEY DISEASE, OR CHRONIC KIDNEY DISEASE (HCC): ICD-10-CM

## 2019-02-07 DIAGNOSIS — Z01.811 PREOPERATIVE RESPIRATORY EXAMINATION: Primary | ICD-10-CM

## 2019-02-07 DIAGNOSIS — R79.1 ABNORMAL COAGULATION PROFILE: ICD-10-CM

## 2019-02-07 LAB
ABO GROUP BLD: NORMAL
ALBUMIN SERPL-MCNC: 4.1 G/DL (ref 3.5–5.2)
ALBUMIN/GLOB SERPL: 1.5 G/DL
ALP SERPL-CCNC: 61 U/L (ref 39–117)
ALT SERPL W P-5'-P-CCNC: 33 U/L (ref 1–41)
ANION GAP SERPL CALCULATED.3IONS-SCNC: 10.8 MMOL/L
APTT PPP: 25.6 SECONDS (ref 22.7–35.4)
ARTERIAL PATENCY WRIST A: POSITIVE
AST SERPL-CCNC: 19 U/L (ref 1–40)
ATMOSPHERIC PRESS: 746.4 MMHG
BASE EXCESS BLDA CALC-SCNC: 0.8 MMOL/L (ref 0–2)
BASOPHILS # BLD AUTO: 0.08 10*3/MM3 (ref 0–0.2)
BASOPHILS NFR BLD AUTO: 0.9 % (ref 0–1.5)
BDY SITE: ABNORMAL
BH CV XLRA MEAS - DIST GSV CALF DIST LEFT: 0.27 CM
BH CV XLRA MEAS - DIST GSV CALF DIST RIGHT: 0.27 CM
BH CV XLRA MEAS - DIST GSV THIGH DIST LEFT: 0.27 CM
BH CV XLRA MEAS - DIST GSV THIGH DIST RIGHT: 0.25 CM
BH CV XLRA MEAS - GSV ANKLE DIST LEFT: 0.24 CM
BH CV XLRA MEAS - GSV ANKLE DIST RIGHT: 0.29 CM
BH CV XLRA MEAS - GSV KNEE DIST LEFT: 0.22 CM
BH CV XLRA MEAS - GSV KNEE DIST RIGHT: 0.36 CM
BH CV XLRA MEAS - GSV ORIGIN DIST LEFT: 0.48 CM
BH CV XLRA MEAS - GSV ORIGIN DIST RIGHT: 0.46 CM
BH CV XLRA MEAS - MID GSV CALF LEFT: 0.3 CM
BH CV XLRA MEAS - MID GSV CALF RIGHT: 0.26 CM
BH CV XLRA MEAS - MID GSV THIGH  LEFT: 0.24 CM
BH CV XLRA MEAS - MID GSV THIGH  RIGHT: 0.26 CM
BH CV XLRA MEAS - PROX GSV CALF DIST LEFT: 0.29 CM
BH CV XLRA MEAS - PROX GSV CALF DIST RIGHT: 0.27 CM
BH CV XLRA MEAS - PROX GSV THIGH  LEFT: 0.31 CM
BH CV XLRA MEAS - PROX GSV THIGH  RIGHT: 0.42 CM
BH CV XLRA MEAS LEFT DIST CCA EDV: 18.5 CM/SEC
BH CV XLRA MEAS LEFT DIST CCA PSV: -58.5 CM/SEC
BH CV XLRA MEAS LEFT DIST ICA EDV: -22.8 CM/SEC
BH CV XLRA MEAS LEFT DIST ICA PSV: -57 CM/SEC
BH CV XLRA MEAS LEFT ICA/CCA RATIO: 0.97
BH CV XLRA MEAS LEFT MID ICA EDV: -18.1 CM/SEC
BH CV XLRA MEAS LEFT MID ICA PSV: -55.4 CM/SEC
BH CV XLRA MEAS LEFT PROX CCA EDV: 17.6 CM/SEC
BH CV XLRA MEAS LEFT PROX CCA PSV: 79.2 CM/SEC
BH CV XLRA MEAS LEFT PROX ICA EDV: -13.7 CM/SEC
BH CV XLRA MEAS LEFT PROX ICA PSV: -57 CM/SEC
BH CV XLRA MEAS LEFT PROX SCLA PSV: 82.1 CM/SEC
BH CV XLRA MEAS LEFT VERTEBRAL A EDV: 9.04 CM/SEC
BH CV XLRA MEAS LEFT VERTEBRAL A PSV: 38.5 CM/SEC
BH CV XLRA MEAS RIGHT DIST CCA EDV: -18.2 CM/SEC
BH CV XLRA MEAS RIGHT DIST CCA PSV: -66.8 CM/SEC
BH CV XLRA MEAS RIGHT DIST ICA EDV: 14.4 CM/SEC
BH CV XLRA MEAS RIGHT DIST ICA PSV: 40.2 CM/SEC
BH CV XLRA MEAS RIGHT ICA/CCA RATIO: 0.77
BH CV XLRA MEAS RIGHT MID ICA EDV: 15.7 CM/SEC
BH CV XLRA MEAS RIGHT MID ICA PSV: 51.9 CM/SEC
BH CV XLRA MEAS RIGHT PROX CCA EDV: 15.8 CM/SEC
BH CV XLRA MEAS RIGHT PROX CCA PSV: 65.7 CM/SEC
BH CV XLRA MEAS RIGHT PROX ECA EDV: -17.6 CM/SEC
BH CV XLRA MEAS RIGHT PROX ECA PSV: 87.9 CM/SEC
BH CV XLRA MEAS RIGHT PROX ICA EDV: -13.8 CM/SEC
BH CV XLRA MEAS RIGHT PROX ICA PSV: -49.9 CM/SEC
BH CV XLRA MEAS RIGHT PROX SCLA PSV: 103 CM/SEC
BH CV XLRA MEAS RIGHT VERTEBRAL A EDV: -8.6 CM/SEC
BH CV XLRA MEAS RIGHT VERTEBRAL A PSV: -36.5 CM/SEC
BILIRUB SERPL-MCNC: 0.4 MG/DL (ref 0.1–1.2)
BILIRUB UR QL STRIP: NEGATIVE
BLD GP AB SCN SERPL QL: NEGATIVE
BUN BLD-MCNC: 15 MG/DL (ref 8–23)
BUN/CREAT SERPL: 15.5 (ref 7–25)
CALCIUM SPEC-SCNC: 9.6 MG/DL (ref 8.6–10.5)
CHLORIDE SERPL-SCNC: 100 MMOL/L (ref 98–107)
CHOLEST SERPL-MCNC: 132 MG/DL (ref 0–200)
CLARITY UR: CLEAR
CLOSE TME COLL+ADP + EPINEP PNL BLD: 98 % (ref 86–100)
CO2 SERPL-SCNC: 28.2 MMOL/L (ref 22–29)
COLOR UR: ABNORMAL
CREAT BLD-MCNC: 0.97 MG/DL (ref 0.76–1.27)
DEPRECATED RDW RBC AUTO: 43.1 FL (ref 37–54)
EOSINOPHIL # BLD AUTO: 0.3 10*3/MM3 (ref 0–0.7)
EOSINOPHIL NFR BLD AUTO: 3.3 % (ref 0.3–6.2)
ERYTHROCYTE [DISTWIDTH] IN BLOOD BY AUTOMATED COUNT: 13.5 % (ref 11.5–14.5)
GFR SERPL CREATININE-BSD FRML MDRD: 77 ML/MIN/1.73
GLOBULIN UR ELPH-MCNC: 2.8 GM/DL
GLUCOSE BLD-MCNC: 209 MG/DL (ref 65–99)
GLUCOSE UR STRIP-MCNC: NEGATIVE MG/DL
HBA1C MFR BLD: 7.42 % (ref 4.8–5.6)
HCO3 BLDA-SCNC: 25.7 MMOL/L (ref 22–28)
HCT VFR BLD AUTO: 48.8 % (ref 40.4–52.2)
HDLC SERPL-MCNC: 37 MG/DL (ref 40–60)
HGB BLD-MCNC: 16.1 G/DL (ref 13.7–17.6)
HGB UR QL STRIP.AUTO: NEGATIVE
IMM GRANULOCYTES # BLD AUTO: 0.02 10*3/MM3 (ref 0–0.03)
IMM GRANULOCYTES NFR BLD AUTO: 0.2 % (ref 0–0.5)
INR PPP: 0.95 (ref 0.9–1.1)
KETONES UR QL STRIP: NEGATIVE
LDLC SERPL CALC-MCNC: 56 MG/DL (ref 0–100)
LDLC/HDLC SERPL: 1.52 {RATIO}
LEFT ARM BP: NORMAL MMHG
LEUKOCYTE ESTERASE UR QL STRIP.AUTO: NEGATIVE
LYMPHOCYTES # BLD AUTO: 2.23 10*3/MM3 (ref 0.9–4.8)
LYMPHOCYTES NFR BLD AUTO: 24.2 % (ref 19.6–45.3)
MCH RBC QN AUTO: 28.8 PG (ref 27–32.7)
MCHC RBC AUTO-ENTMCNC: 33 G/DL (ref 32.6–36.4)
MCV RBC AUTO: 87.3 FL (ref 79.8–96.2)
MODALITY: ABNORMAL
MONOCYTES # BLD AUTO: 0.56 10*3/MM3 (ref 0.2–1.2)
MONOCYTES NFR BLD AUTO: 6.1 % (ref 5–12)
NEUTROPHILS # BLD AUTO: 6.01 10*3/MM3 (ref 1.9–8.1)
NEUTROPHILS NFR BLD AUTO: 65.3 % (ref 42.7–76)
NITRITE UR QL STRIP: NEGATIVE
NT-PROBNP SERPL-MCNC: 25.1 PG/ML (ref 0–900)
PCO2 BLDA: 40.9 MM HG (ref 35–45)
PH BLDA: 7.41 PH UNITS (ref 7.35–7.45)
PH UR STRIP.AUTO: <=5 [PH] (ref 5–8)
PLATELET # BLD AUTO: 193 10*3/MM3 (ref 140–500)
PMV BLD AUTO: 9.8 FL (ref 6–12)
PO2 BLDA: 75.2 MM HG (ref 80–100)
POTASSIUM BLD-SCNC: 4.8 MMOL/L (ref 3.5–5.2)
PROT SERPL-MCNC: 6.9 G/DL (ref 6–8.5)
PROT UR QL STRIP: NEGATIVE
PROTHROMBIN TIME: 12.5 SECONDS (ref 11.7–14.2)
RBC # BLD AUTO: 5.59 10*6/MM3 (ref 4.6–6)
RH BLD: POSITIVE
RIGHT ARM BP: NORMAL MMHG
SAO2 % BLDCOA: 95 % (ref 92–99)
SODIUM BLD-SCNC: 139 MMOL/L (ref 136–145)
SP GR UR STRIP: 1.02 (ref 1–1.03)
T&S EXPIRATION DATE: NORMAL
TOTAL RATE: 18 BREATHS/MINUTE
TRIGL SERPL-MCNC: 193 MG/DL (ref 0–150)
UROBILINOGEN UR QL STRIP: ABNORMAL
VLDLC SERPL-MCNC: 38.6 MG/DL (ref 5–40)
WBC NRBC COR # BLD: 9.2 10*3/MM3 (ref 4.5–10.7)

## 2019-02-07 PROCEDURE — 85025 COMPLETE CBC W/AUTO DIFF WBC: CPT | Performed by: NURSE PRACTITIONER

## 2019-02-07 PROCEDURE — 81003 URINALYSIS AUTO W/O SCOPE: CPT | Performed by: NURSE PRACTITIONER

## 2019-02-07 PROCEDURE — 36600 WITHDRAWAL OF ARTERIAL BLOOD: CPT

## 2019-02-07 PROCEDURE — 93970 EXTREMITY STUDY: CPT

## 2019-02-07 PROCEDURE — A9270 NON-COVERED ITEM OR SERVICE: HCPCS | Performed by: NURSE PRACTITIONER

## 2019-02-07 PROCEDURE — 93005 ELECTROCARDIOGRAM TRACING: CPT

## 2019-02-07 PROCEDURE — 83036 HEMOGLOBIN GLYCOSYLATED A1C: CPT | Performed by: NURSE PRACTITIONER

## 2019-02-07 PROCEDURE — 85730 THROMBOPLASTIN TIME PARTIAL: CPT | Performed by: NURSE PRACTITIONER

## 2019-02-07 PROCEDURE — 93880 EXTRACRANIAL BILAT STUDY: CPT

## 2019-02-07 PROCEDURE — 63710000001 MUPIROCIN 2 % OINTMENT: Performed by: NURSE PRACTITIONER

## 2019-02-07 PROCEDURE — 36415 COLL VENOUS BLD VENIPUNCTURE: CPT

## 2019-02-07 PROCEDURE — 86920 COMPATIBILITY TEST SPIN: CPT

## 2019-02-07 PROCEDURE — 80061 LIPID PANEL: CPT | Performed by: NURSE PRACTITIONER

## 2019-02-07 PROCEDURE — 86901 BLOOD TYPING SEROLOGIC RH(D): CPT | Performed by: NURSE PRACTITIONER

## 2019-02-07 PROCEDURE — 86900 BLOOD TYPING SEROLOGIC ABO: CPT | Performed by: NURSE PRACTITIONER

## 2019-02-07 PROCEDURE — 63710000001 CHLORHEXIDINE 0.12 % SOLUTION: Performed by: NURSE PRACTITIONER

## 2019-02-07 PROCEDURE — 94060 EVALUATION OF WHEEZING: CPT

## 2019-02-07 PROCEDURE — S0260 H&P FOR SURGERY: HCPCS | Performed by: NURSE PRACTITIONER

## 2019-02-07 PROCEDURE — 86850 RBC ANTIBODY SCREEN: CPT | Performed by: NURSE PRACTITIONER

## 2019-02-07 PROCEDURE — 71046 X-RAY EXAM CHEST 2 VIEWS: CPT

## 2019-02-07 PROCEDURE — 82803 BLOOD GASES ANY COMBINATION: CPT

## 2019-02-07 PROCEDURE — 83880 ASSAY OF NATRIURETIC PEPTIDE: CPT | Performed by: NURSE PRACTITIONER

## 2019-02-07 PROCEDURE — 93010 ELECTROCARDIOGRAM REPORT: CPT | Performed by: INTERNAL MEDICINE

## 2019-02-07 PROCEDURE — 85576 BLOOD PLATELET AGGREGATION: CPT | Performed by: NURSE PRACTITIONER

## 2019-02-07 PROCEDURE — 85610 PROTHROMBIN TIME: CPT | Performed by: NURSE PRACTITIONER

## 2019-02-07 PROCEDURE — 80053 COMPREHEN METABOLIC PANEL: CPT | Performed by: NURSE PRACTITIONER

## 2019-02-07 PROCEDURE — 94799 UNLISTED PULMONARY SVC/PX: CPT

## 2019-02-07 RX ORDER — CARVEDILOL 6.25 MG/1
6.25 TABLET ORAL 2 TIMES DAILY WITH MEALS
COMMUNITY
End: 2019-02-13 | Stop reason: HOSPADM

## 2019-02-07 RX ORDER — CHLORHEXIDINE GLUCONATE 0.12 MG/ML
15 RINSE ORAL
COMMUNITY
End: 2019-02-13 | Stop reason: HOSPADM

## 2019-02-07 RX ORDER — GLIMEPIRIDE 1 MG/1
1 TABLET ORAL 2 TIMES DAILY
COMMUNITY
End: 2019-03-28

## 2019-02-07 RX ORDER — CHLORHEXIDINE GLUCONATE 0.12 MG/ML
15 RINSE ORAL EVERY 12 HOURS
Status: DISPENSED | OUTPATIENT
Start: 2019-02-07 | End: 2019-02-08

## 2019-02-07 RX ORDER — CHLORHEXIDINE GLUCONATE 500 MG/1
1 CLOTH TOPICAL EVERY 12 HOURS PRN
Status: DISCONTINUED | OUTPATIENT
Start: 2019-02-07 | End: 2022-01-06

## 2019-02-07 RX ORDER — LEVOTHYROXINE SODIUM 175 UG/1
175 TABLET ORAL DAILY
COMMUNITY
End: 2019-08-07 | Stop reason: SDUPTHER

## 2019-02-07 RX ORDER — ALBUTEROL SULFATE 2.5 MG/3ML
2.5 SOLUTION RESPIRATORY (INHALATION) ONCE AS NEEDED
Status: COMPLETED | OUTPATIENT
Start: 2019-02-07 | End: 2019-02-07

## 2019-02-07 RX ADMIN — ALBUTEROL SULFATE 2.5 MG: 2.5 SOLUTION RESPIRATORY (INHALATION) at 10:07

## 2019-02-07 NOTE — H&P
Nathaniel Crawford MD   Physician   Cardiothoracic Surgery   Consults   Signed   Date of Service:  2019  2:27 PM               Signed            Name: Spencer Sinha ADMIT: 2019   : 1950  PCP: Phil Gilliam MD    MRN: [id number] LOS: 0 days   AGE/SEX: 68 y.o. male  ROOM: Pool/CATH      CC: shortness of breath        Subjective      Patient is a 68 y.o. male presents with intermittent chest pressure     History of Present Illness  Mr. Sinha is a pleasant 68 year old male we were asked to see after a cardiac catheterization reveals severe multivessel coronary artery disease.  He states he has noticed progressively worsening fatigue  and shortness of breath for the 3-4 months.  He states as having low level chest pressure, non radiated, appears at night or with effort. He denies fawn chest pain, palpitations, orthopnea, or lower extremity edema.  He has a history of aorto iliac occlusion and stenting by dr. Rosas. He had a cardiac cath that showed severe 3 vessel CAD and preserved LV function. He is on Plavix at the present. He came for an outpatient cath     Medical History        Past Medical History:   Diagnosis Date   • Arteriosclerotic coronary artery disease     • Atrial fibrillation (CMS/HCC)     • Coronary artery disease     • Diabetes (CMS/HCC)       type II, mellitus   • Diplopia     • Discoid lupus erythematosus     • Graves disease     • History of echocardiogram       L ATRIUM ENLARGED, anterior myocardial infarction, lft axis deviation, abnormal ECG   • History of electrocardiogram       SHOWS NORMAL SINUS RHYTHM, ANTERIOR MYOCARDIAL INFARCTION, OLD LDEFT AXIS DEVIATION, LEFT ANTERIOR FASCICULAR BLOCK, ABNORMAL ECG   • Hyperinsulinism     • Hypertension     • Hypertensive heart disease     • Hypogonadism, male     • Hypothyroidism, postablative     • IBS (irritable bowel syndrome)     • Left ventricular hypertrophy     • Long-term insulin use (CMS/HCC)     • Metabolic  disorder     • Myocardial infarction of inferolateral wall greater than eight weeks ago       of inferior wall, greater than 8 weeks   • Osteoarthritis     • Peripheral vascular disease (CMS/HCC)     • Right bundle branch block with left anterior fascicular block     • Syncope     • Thyrotoxicosis factitia     • Type 2 diabetes mellitus (CMS/HCC)           Surgical History         Past Surgical History:   Procedure Laterality Date   • ABDOMINAL AORTA STENT Bilateral       USING VEIN: AORTOFEMORAL   • BACK SURGERY       • CATARACT EXTRACTION       • COLONOSCOPY       • OTHER SURGICAL HISTORY         CATHETER ABLATION   • REPLACEMENT TOTAL KNEE BILATERAL       • TONSILLECTOMY AND ADENOIDECTOMY                   Family History   Problem Relation Age of Onset   • Coronary artery disease Other        Social History            Tobacco Use   • Smoking status: Former Smoker   • Smokeless tobacco: Never Used   Substance Use Topics   • Alcohol use: No       Comment: caffeine use   • Drug use: No      Prescriptions Prior to Admission           Medications Prior to Admission   Medication Sig Dispense Refill Last Dose   • ALPRAZolam (XANAX) 0.5 MG tablet Take 0.5 mg by mouth 2 (Two) Times a Day.     1/30/2019 at Unknown time   • aspirin 81 MG EC tablet Take 81 mg by mouth Daily.     1/30/2019 at Unknown time   • atorvastatin (LIPITOR) 40 MG tablet Take 1 tablet by mouth Daily. 90 tablet 2 1/30/2019 at Unknown time   • CALCIUM PO Take 1 tablet by mouth daily.     1/30/2019 at Unknown time   • carvedilol (COREG) 6.25 MG tablet TAKE ONE TABLET BY MOUTH TWICE A DAY WITH MEALS 180 tablet 2 1/30/2019 at Unknown time   • Cholecalciferol (VITAMIN D3) 2000 units tablet Take 1 tablet by mouth Daily.     1/30/2019 at Unknown time   • clopidogrel (PLAVIX) 75 MG tablet Take 75 mg by mouth Daily.     1/30/2019 at Unknown time   • DULoxetine (CYMBALTA) 30 MG capsule Take 30 mg by mouth Every Morning.     1/30/2019 at Unknown time   •  "DULoxetine (CYMBALTA) 60 MG capsule Take 60 mg by mouth Every Evening.     1/30/2019 at Unknown time   • glimepiride (AMARYL) 2 MG tablet TAKE ONE TABLET BY MOUTH DAILY AS DIRECTED 90 tablet 0 1/30/2019 at Unknown time   • levothyroxine (SYNTHROID, LEVOTHROID) 175 MCG tablet Take 1 tablet by mouth Daily. 90 tablet 1 1/30/2019 at Unknown time   • lisinopril (PRINIVIL,ZESTRIL) 5 MG tablet Take 1 tablet by mouth Daily. 90 tablet 3 1/30/2019 at Unknown time   • Magnesium 100 MG capsule Take 1 tablet by mouth Daily.     1/30/2019 at Unknown time   • metFORMIN (GLUCOPHAGE) 1000 MG tablet TAKE ONE TABLET BY MOUTH TWICE A  tablet 0 1/30/2019 at Unknown time   • Multiple Vitamin (MULTI-DAY VITAMINS PO) Take 1 tablet by mouth Daily.     1/30/2019 at Unknown time   • traZODone (DESYREL) 50 MG tablet Take 50 mg by mouth Every Night.     1/30/2019 at Unknown time   • VICTOZA 18 MG/3ML solution pen-injector injection DIAL AND INJECT SUBCUTANEOUSLY 1.8MG DAILY 9 pen 1 1/30/2019 at Unknown time   • Zinc 100 MG tablet Take 1 tablet by mouth daily.     1/30/2019 at Unknown time   • glucose blood (KIMBERLY CONTOUR TEST) test strip Kimberly Contour Test In Vitro Strip; Patient Sig: Kimberly Contour Test In Vitro Strip ; 100; 0; 06-May-2012; Active     Taking   • Insulin Pen Needle (NOVOFINE) 32G X 6 MM misc       Taking   • Syringe/Needle, Disp, (B-D SYRINGE/NEEDLE 3CC/22GX1.5) 22G X 1-1/2\" 3 ML misc       Taking   • Testosterone Cypionate (DEPOTESTOTERONE CYPIONATE) 200 MG/ML injection INJECT 0.5 ML INTRAMUSCULARLY EVERY 10 DAYS 2 mL 5 1/28/2019         Allergies:  Patient has no known allergies.     Review of Systems   Constitutional: Positive for activity change and fatigue.   HENT: Negative.    Eyes: Positive for photophobia.   Respiratory: Positive for cough, chest tightness and shortness of breath.    All other systems reviewed and are negative.             Objective       Vital Signs  Temp:  [97 °F (36.1 °C)] 97 °F (36.1 " °C)  Heart Rate:  [66-74] 73  Resp:  [16-18] 18  BP: (102-127)/(70-84) 116/84  SpO2:  [93 %-98 %] 97 %  on   ;   Device (Oxygen Therapy): room air  Body mass index is 27.89 kg/m².     Physical Exam   Constitutional: He is oriented to person, place, and time. He appears well-developed and well-nourished. No distress.   HENT:   Head: Normocephalic and atraumatic.   Nose: Nose normal.   Mouth/Throat: Oropharynx is clear and moist. No oropharyngeal exudate.   Eyes: Conjunctivae and EOM are normal. Pupils are equal, round, and reactive to light. No scleral icterus.   Neck: Normal range of motion. Neck supple. No JVD present.   Cardiovascular: Normal rate, regular rhythm, normal heart sounds and intact distal pulses.   No murmur heard.  Pulmonary/Chest: Effort normal and breath sounds normal. No respiratory distress. He has no rales.   Abdominal: Soft. Bowel sounds are normal. He exhibits no distension. There is no rebound.   Genitourinary:   Genitourinary Comments: No kimball   Musculoskeletal: Normal range of motion. He exhibits no edema or tenderness.   Neurological: He is alert and oriented to person, place, and time. No cranial nerve deficit.   Skin: Skin is warm and dry. No erythema.   Psychiatric: He has a normal mood and affect. His behavior is normal. Judgment and thought content normal.         Results Review:              I reviewed the patient's new clinical results.          Assessment/Plan           Angina pectoris (CMS/HCC)    Abnormal nuclear stress test        Assessment & Plan     -Severe CAD  -Hypertension  -Hyperlipidemia  -hypothyroidism-on synthroid  -hx of tobacco abuse  -PAD- s/p stents-on plavix     *all new to this examiner     Dr. Crawford reviewed films and recommends coronary artery bypass surgery.  Okay to go home. Will have our office call and schedule surgery as an outpatient.  Dr. Crawford would like his plavix held for 7 days prior to operation.       Bonny Blank, ANETA  01/31/19  2:27 PM       Addendum  Patient seen and examined by me. Agree with findings. Multivessel CAD with episodic angina and dyspnea and occluded LAD. I recommend CABG with LIMA. I discussed the risks (STS calculated), benefits and alternatives of surgery and he wishes to proceed. Will schedule surgery as an outpatient.  Nathaniel Crawford M.D.                       2/7/19 Interval H&P:  I saw Mr. Sinha in [facility] today.  All of his preoperative studies are within normal limits including a platelet aggregation of 98%.  His carotid and vein mapping studies are still pending.  There've been no changes since he was last seen by Dr. Crawford in the hospital after his cardiac catheter.  The patient is scheduled for CABG tomorrow with Dr. Crawford.  I spent a significant amount time discussing postoperative expectations and answering any questions that he and his spouse may have had.    Elizabeth Sorto, ANETA  Cardiothoracic surgery

## 2019-02-07 NOTE — ANESTHESIA PREPROCEDURE EVALUATION
Anesthesia Evaluation     no history of anesthetic complications:  NPO Solid Status: > 8 hours             Airway   Mallampati: II  TM distance: >3 FB  Neck ROM: full  Dental - normal exam     Pulmonary - normal exam   (+) a smoker Former,   (-) COPD, asthma, sleep apnea  Cardiovascular - normal exam  Exercise tolerance: good (4-7 METS)    ECG reviewed    (+) hypertension, past MI , CAD, dysrhythmias Atrial Fib, angina with exertion, PVD, hyperlipidemia,   (-) cardiac stents    ROS comment: Sinus rhythm  Borderline prolonged NJ interval  Left anterior fascicular block  Low voltage, precordial leads  No change from prior tracing  Electronically Signed By: Vicki Pack ([facility]) 07-Feb-2019 18:16:54    · Left ventricular ejection fraction is normal (Calculated EF = 51%).  · Myocardial perfusion imaging indicates a medium-sized, moderate-to-severe area of ischemia .  · Impressions are consistent with an intermediate risk study.    SUMMARY: Normal LV function severe three-vessel disease diabetes    Neuro/Psych  (+) psychiatric history Depression,     GI/Hepatic/Renal/Endo    (+)   diabetes mellitus, hypothyroidism, hyperthyroidism (s/p thyroidectomy and XRT to thyroid)    Musculoskeletal     Abdominal  - normal exam    Bowel sounds: normal.   Substance History      OB/GYN          Other                      Anesthesia Plan    ASA 4     general   (Hx XRT to thyroid.  No hx dysphagia or esophageal disease  Art/cvc)  Anesthetic plan, all risks, benefits, and alternatives have been provided, discussed and informed consent has been obtained with: patient.

## 2019-02-07 NOTE — DISCHARGE INSTRUCTIONS
Take the following medications the morning of surgery with a small sip of water:  NONE      General Instructions:  • Do not eat solid food after midnight the night before surgery.  • You may drink clear liquids day of surgery but must stop at least one hour before your hospital arrival time.  • It is beneficial for you to have a clear drink that contains carbohydrates the day of surgery.  We suggest a 12 to 20 ounce bottle of Gatorade or Powerade for non-diabetic patients or a 12 to 20 ounce bottle of G2 or Powerade Zero for diabetic patients. (Pediatric patients, are not advised to drink a 12 to 20 ounce carbohydrate drink)    Clear liquids are liquids you can see through.  Nothing red in color.     Plain water                               Sports drinks  Sodas                                   Gelatin (Jell-O)  Fruit juices without pulp such as white grape juice and apple juice  Popsicles that contain no fruit or yogurt  Tea or coffee (no cream or milk added)  Gatorade / Powerade  G2 / Powerade Zero    • Infants may have breast milk up to four hours before surgery.  • Infants drinking formula may drink formula up to six hours before surgery.   • Patients who avoid smoking, chewing tobacco and alcohol for 4 weeks prior to surgery have a reduced risk of post-operative complications.  Quit smoking as many days before surgery as you can.  • Do not smoke, use chewing tobacco or drink alcohol the day of surgery.   • If applicable bring your C-PAP/ BI-PAP machine.  • Bring any papers given to you in the doctor’s office.  • Wear clean comfortable clothes and socks.  • Do not wear contact lenses or make-up.  Bring a case for your glasses.   • Bring crutches or walker if applicable.  • Remove all piercings.  Leave jewelry and any other valuables at home.  • Hair extensions with metal clips must be removed prior to surgery.  • The Pre-Admission Testing nurse will instruct you to bring medications if unable to obtain an  accurate list in Pre-Admission Testing.        If you were given a blood bank ID arm band remember to bring it with you the day of surgery.    Preventing a Surgical Site Infection:  • For 2 to 3 days before surgery, avoid shaving with a razor because the razor can irritate skin and make it easier to develop an infection.    • Any areas of open skin can increase the risk of a post-operative wound infection by allowing bacteria to enter and travel throughout the body.  Notify your surgeon if you have any skin wounds / rashes even if it is not near the expected surgical site.  The area will need assessed to determine if surgery should be delayed until it is healed.  • The night prior to surgery sleep in a clean bed with clean clothing.  Do not allow pets to sleep with you.  • Shower on the morning of surgery using a fresh bar of anti-bacterial soap (such as Dial) and clean washcloth.  Dry with a clean towel and dress in clean clothing.  • Ask your surgeon if you will be receiving antibiotics prior to surgery.  • Make sure you, your family, and all healthcare providers clean their hands with soap and water or an alcohol based hand  before caring for you or your wound.    Day of surgery: 2/8/2019 ARRIVAL TIME 6:00 AM  Upon arrival, a Pre-op nurse and Anesthesiologist will review your health history, obtain vital signs, and answer questions you may have.  The only belongings needed at this time will be your home medications and if applicable your C-PAP/BI-PAP machine.  If you are staying overnight your family can leave the rest of your belongings in the car and bring them to your room later.  A Pre-op nurse will start an IV and you may receive medication in preparation for surgery, including something to help you relax.  Your family will be able to see you in the Pre-op area.  While you are in surgery your family should notify the waiting room  if they leave the waiting room area and provide a contact  phone number.    Please be aware that surgery does come with discomfort.  We want to make every effort to control your discomfort so please discuss any uncontrolled symptoms with your nurse.   Your doctor will most likely have prescribed pain medications.      If you are going home after surgery you will receive individualized written care instructions before being discharged.  A responsible adult must drive you to and from the hospital on the day of your surgery and stay with you for 24 hours.    If you are staying overnight following surgery, you will be transported to your hospital room following the recovery period.  [facility] has all private rooms.    You have received a list of surgical assistants for your reference.  If you have any questions please call Pre-Admission Testing at [phone].  Deductibles and co-payments are collected on the day of service. Please be prepared to pay the required co-pay, deductible or deposit on the day of service as defined by your plan.    2% CHLORAHEXIDINE GLUCONATE* CLOTH  Preparing or “prepping” skin before surgery can reduce the risk of infection at the surgical site. To make the process easier, [facility] has chosen disposable cloths moistened with a rinse-free, 2% Chlorhexidine Gluconate (CHG) antiseptic solution. The steps below outline the prepping process and should be carefully followed.        Use the prep cloth on the area that is circled in the diagram             Directions Night before Surgery  1) Shower using a fresh bar of anti-bacterial soap (such as Dial) and clean washcloth.  Use a clean towel to completely dry your skin.  2) Do not use any lotions, oils or creams on your skin.  3) Open the package and remove 1 cloth, wipe your skin for 30 seconds in a circular motion.  Allow to dry for 3 minutes.  4) Repeat #3 with second cloth.  5) Do not touch your eyes, ears, or mouth with the prep cloth.  6) Allow the wet prep solution to  air dry.  7) Discard the prep cloth and wash your hands with soap and water.   8) Dress in clean bed clothes and sleep on fresh clean bed sheets.   9) You may experience some temporary itching after the prep.    Directions Day of Surgery  1) Repeat steps 1,2,3,4,5,6,7, and 9.   2) Dress in clean clothes before coming to the hospital.    BACTROBAN NASAL OINTMENT  There are many germs normally in your nose. Bactroban is an ointment that will help reduce these germs. Please follow these instructions for Bactroban use:      ____The day before surgery in the morning  Date________    ____The day before surgery in the evening              Date________    ____The day of surgery in the morning    Date________    **Squirt ½ package of Bactroban Ointment onto a cotton applicator and apply to inside of 1st nostril.  Squirt the remaining Bactroban and apply to the inside of the other nostril.    PERIDEX- ORAL:  Use only if your surgeon has ordered  Use the night before and morning of surgery - Swish, gargle, and spit - do not swallow.

## 2019-02-08 ENCOUNTER — APPOINTMENT (OUTPATIENT)
Dept: GENERAL RADIOLOGY | Facility: HOSPITAL | Age: 69
End: 2019-02-08

## 2019-02-08 ENCOUNTER — ANESTHESIA (OUTPATIENT)
Dept: PERIOP | Facility: HOSPITAL | Age: 69
End: 2019-02-08

## 2019-02-08 ENCOUNTER — ANCILLARY PROCEDURE (OUTPATIENT)
Dept: PERIOP | Facility: HOSPITAL | Age: 69
End: 2019-02-08

## 2019-02-08 ENCOUNTER — HOSPITAL ENCOUNTER (INPATIENT)
Facility: HOSPITAL | Age: 69
LOS: 5 days | Discharge: HOME HEALTH | End: 2019-02-13
Attending: THORACIC SURGERY (CARDIOTHORACIC VASCULAR SURGERY) | Admitting: THORACIC SURGERY (CARDIOTHORACIC VASCULAR SURGERY)

## 2019-02-08 DIAGNOSIS — Z95.1 S/P CABG X 3: ICD-10-CM

## 2019-02-08 DIAGNOSIS — Z74.09 IMPAIRED MOBILITY: Primary | ICD-10-CM

## 2019-02-08 DIAGNOSIS — I25.118 CORONARY ARTERY DISEASE OF NATIVE HEART WITH STABLE ANGINA PECTORIS, UNSPECIFIED VESSEL OR LESION TYPE (HCC): ICD-10-CM

## 2019-02-08 LAB
ACT BLD: 120 SECONDS (ref 82–152)
ACT BLD: 125 SECONDS (ref 82–152)
ACT BLD: 378 SECONDS (ref 82–152)
ACT BLD: 439 SECONDS (ref 82–152)
ALBUMIN SERPL-MCNC: 4.2 G/DL (ref 3.5–5.2)
ALBUMIN SERPL-MCNC: 4.4 G/DL (ref 3.5–5.2)
ANION GAP SERPL CALCULATED.3IONS-SCNC: 13.1 MMOL/L
ANION GAP SERPL CALCULATED.3IONS-SCNC: 13.2 MMOL/L
APTT PPP: 30.9 SECONDS (ref 22.7–35.4)
ARTERIAL PATENCY WRIST A: ABNORMAL
ARTERIAL PATENCY WRIST A: ABNORMAL
ATMOSPHERIC PRESS: 765.9 MMHG
ATMOSPHERIC PRESS: 767.4 MMHG
BASE EXCESS BLDA CALC-SCNC: -0.7 MMOL/L (ref 0–2)
BASE EXCESS BLDA CALC-SCNC: -1.9 MMOL/L (ref 0–2)
BASE EXCESS BLDA CALC-SCNC: -2 MMOL/L (ref -5–5)
BASE EXCESS BLDA CALC-SCNC: 0 MMOL/L (ref -5–5)
BASE EXCESS BLDA CALC-SCNC: 1 MMOL/L (ref -5–5)
BASE EXCESS BLDA CALC-SCNC: 4 MMOL/L (ref -5–5)
BASOPHILS # BLD AUTO: 0.03 10*3/MM3 (ref 0–0.2)
BASOPHILS NFR BLD AUTO: 0.4 % (ref 0–1.5)
BDY SITE: ABNORMAL
BDY SITE: ABNORMAL
BUN BLD-MCNC: 16 MG/DL (ref 8–23)
BUN BLD-MCNC: 16 MG/DL (ref 8–23)
BUN/CREAT SERPL: 14.7 (ref 7–25)
BUN/CREAT SERPL: 17.4 (ref 7–25)
CA-I BLD-MCNC: 5 MG/DL (ref 4.6–5.4)
CA-I SERPL ISE-MCNC: 1.26 MMOL/L (ref 1.15–1.35)
CALCIUM SPEC-SCNC: 8.5 MG/DL (ref 8.6–10.5)
CALCIUM SPEC-SCNC: 8.6 MG/DL (ref 8.6–10.5)
CHLORIDE SERPL-SCNC: 105 MMOL/L (ref 98–107)
CHLORIDE SERPL-SCNC: 109 MMOL/L (ref 98–107)
CO2 BLDA-SCNC: 26 MMOL/L (ref 24–29)
CO2 BLDA-SCNC: 28 MMOL/L (ref 24–29)
CO2 BLDA-SCNC: 28 MMOL/L (ref 24–29)
CO2 BLDA-SCNC: 31 MMOL/L (ref 24–29)
CO2 SERPL-SCNC: 21.8 MMOL/L (ref 22–29)
CO2 SERPL-SCNC: 22.9 MMOL/L (ref 22–29)
CREAT BLD-MCNC: 0.92 MG/DL (ref 0.76–1.27)
CREAT BLD-MCNC: 1.09 MG/DL (ref 0.76–1.27)
DEPRECATED RDW RBC AUTO: 43.1 FL (ref 37–54)
DEPRECATED RDW RBC AUTO: 43.7 FL (ref 37–54)
EOSINOPHIL # BLD AUTO: 0.16 10*3/MM3 (ref 0–0.7)
EOSINOPHIL NFR BLD AUTO: 2.1 % (ref 0.3–6.2)
ERYTHROCYTE [DISTWIDTH] IN BLOOD BY AUTOMATED COUNT: 13.7 % (ref 11.5–14.5)
ERYTHROCYTE [DISTWIDTH] IN BLOOD BY AUTOMATED COUNT: 13.7 % (ref 11.5–14.5)
FIBRINOGEN PPP-MCNC: 253 MG/DL (ref 219–464)
GFR SERPL CREATININE-BSD FRML MDRD: 67 ML/MIN/1.73
GFR SERPL CREATININE-BSD FRML MDRD: 82 ML/MIN/1.73
GLUCOSE BLD-MCNC: 135 MG/DL (ref 65–99)
GLUCOSE BLD-MCNC: 176 MG/DL (ref 65–99)
GLUCOSE BLDC GLUCOMTR-MCNC: 108 MG/DL (ref 70–130)
GLUCOSE BLDC GLUCOMTR-MCNC: 118 MG/DL (ref 70–130)
GLUCOSE BLDC GLUCOMTR-MCNC: 123 MG/DL (ref 70–130)
GLUCOSE BLDC GLUCOMTR-MCNC: 125 MG/DL (ref 70–130)
GLUCOSE BLDC GLUCOMTR-MCNC: 128 MG/DL (ref 70–130)
GLUCOSE BLDC GLUCOMTR-MCNC: 129 MG/DL (ref 70–130)
GLUCOSE BLDC GLUCOMTR-MCNC: 135 MG/DL (ref 70–130)
GLUCOSE BLDC GLUCOMTR-MCNC: 145 MG/DL (ref 70–130)
GLUCOSE BLDC GLUCOMTR-MCNC: 166 MG/DL (ref 70–130)
GLUCOSE BLDC GLUCOMTR-MCNC: 172 MG/DL (ref 70–130)
GLUCOSE BLDC GLUCOMTR-MCNC: 177 MG/DL (ref 70–130)
GLUCOSE BLDC GLUCOMTR-MCNC: 195 MG/DL (ref 70–130)
GLUCOSE BLDC GLUCOMTR-MCNC: 197 MG/DL (ref 70–130)
GLUCOSE BLDC GLUCOMTR-MCNC: 202 MG/DL (ref 70–130)
HCO3 BLDA-SCNC: 22.4 MMOL/L (ref 22–28)
HCO3 BLDA-SCNC: 23.7 MMOL/L (ref 22–28)
HCO3 BLDA-SCNC: 24.2 MMOL/L (ref 22–26)
HCO3 BLDA-SCNC: 26.4 MMOL/L (ref 22–26)
HCO3 BLDA-SCNC: 26.6 MMOL/L (ref 22–26)
HCO3 BLDA-SCNC: 29.6 MMOL/L (ref 22–26)
HCT VFR BLD AUTO: 39.1 % (ref 40.4–52.2)
HCT VFR BLD AUTO: 40.8 % (ref 40.4–52.2)
HCT VFR BLDA CALC: 34 % (ref 38–51)
HCT VFR BLDA CALC: 34 % (ref 38–51)
HCT VFR BLDA CALC: 36 % (ref 38–51)
HCT VFR BLDA CALC: 40 % (ref 38–51)
HGB BLD-MCNC: 12.6 G/DL (ref 13.7–17.6)
HGB BLD-MCNC: 13.2 G/DL (ref 13.7–17.6)
HGB BLDA-MCNC: 11.6 G/DL (ref 12–17)
HGB BLDA-MCNC: 11.6 G/DL (ref 12–17)
HGB BLDA-MCNC: 12.2 G/DL (ref 12–17)
HGB BLDA-MCNC: 13.6 G/DL (ref 12–17)
HOROWITZ INDEX BLD+IHG-RTO: 100 %
HOROWITZ INDEX BLD+IHG-RTO: 40 %
IMM GRANULOCYTES # BLD AUTO: 0 10*3/MM3 (ref 0–0.03)
IMM GRANULOCYTES NFR BLD AUTO: 0 % (ref 0–0.5)
INR PPP: 1.33 (ref 0.9–1.1)
LYMPHOCYTES # BLD AUTO: 1.59 10*3/MM3 (ref 0.9–4.8)
LYMPHOCYTES NFR BLD AUTO: 21.3 % (ref 19.6–45.3)
MAGNESIUM SERPL-MCNC: 2.1 MG/DL (ref 1.6–2.4)
MAGNESIUM SERPL-MCNC: 2.3 MG/DL (ref 1.6–2.4)
MAGNESIUM SERPL-MCNC: 2.4 MG/DL (ref 1.6–2.4)
MCH RBC QN AUTO: 28.1 PG (ref 27–32.7)
MCH RBC QN AUTO: 28.3 PG (ref 27–32.7)
MCHC RBC AUTO-ENTMCNC: 32.2 G/DL (ref 32.6–36.4)
MCHC RBC AUTO-ENTMCNC: 32.4 G/DL (ref 32.6–36.4)
MCV RBC AUTO: 87.3 FL (ref 79.8–96.2)
MCV RBC AUTO: 87.4 FL (ref 79.8–96.2)
MODALITY: ABNORMAL
MODALITY: ABNORMAL
MONOCYTES # BLD AUTO: 0.29 10*3/MM3 (ref 0.2–1.2)
MONOCYTES NFR BLD AUTO: 3.9 % (ref 5–12)
NEUTROPHILS # BLD AUTO: 5.41 10*3/MM3 (ref 1.9–8.1)
NEUTROPHILS NFR BLD AUTO: 72.3 % (ref 42.7–76)
O2 A-A PPRESDIFF RESPIRATORY: 0.4 MMHG
O2 A-A PPRESDIFF RESPIRATORY: 0.6 MMHG
PCO2 BLDA: 32 MM HG (ref 35–45)
PCO2 BLDA: 42.2 MM HG (ref 35–45)
PCO2 BLDA: 46.7 MM HG (ref 35–45)
PCO2 BLDA: 46.9 MM HG (ref 35–45)
PCO2 BLDA: 51 MM HG (ref 35–45)
PCO2 BLDA: 54.2 MM HG (ref 35–45)
PEEP RESPIRATORY: 7.5 CM[H2O]
PEEP RESPIRATORY: 7.5 CM[H2O]
PH BLDA: 7.3 PH UNITS (ref 7.35–7.6)
PH BLDA: 7.32 PH UNITS (ref 7.35–7.6)
PH BLDA: 7.36 PH UNITS (ref 7.35–7.45)
PH BLDA: 7.36 PH UNITS (ref 7.35–7.6)
PH BLDA: 7.37 PH UNITS (ref 7.35–7.6)
PH BLDA: 7.45 PH UNITS (ref 7.35–7.45)
PHOSPHATE SERPL-MCNC: 3.4 MG/DL (ref 2.5–4.5)
PHOSPHATE SERPL-MCNC: 4.3 MG/DL (ref 2.5–4.5)
PLATELET # BLD AUTO: 135 10*3/MM3 (ref 140–500)
PLATELET # BLD AUTO: 143 10*3/MM3 (ref 140–500)
PMV BLD AUTO: 10 FL (ref 6–12)
PMV BLD AUTO: 9.9 FL (ref 6–12)
PO2 BLDA: 144 MM HG (ref 80–100)
PO2 BLDA: 209 MMHG (ref 80–105)
PO2 BLDA: 290.4 MM HG (ref 80–100)
PO2 BLDA: 366 MMHG (ref 80–105)
PO2 BLDA: 429 MMHG (ref 80–105)
PO2 BLDA: 481 MMHG (ref 80–105)
POTASSIUM BLD-SCNC: 3.5 MMOL/L (ref 3.5–5.2)
POTASSIUM BLD-SCNC: 4 MMOL/L (ref 3.5–5.2)
POTASSIUM BLD-SCNC: 4.8 MMOL/L (ref 3.5–5.2)
POTASSIUM BLDA-SCNC: 4.1 MMOL/L (ref 3.5–4.9)
POTASSIUM BLDA-SCNC: 4.8 MMOL/L (ref 3.5–4.9)
POTASSIUM BLDA-SCNC: 5.2 MMOL/L (ref 3.5–4.9)
POTASSIUM BLDA-SCNC: 5.6 MMOL/L (ref 3.5–4.9)
PROTHROMBIN TIME: 16.2 SECONDS (ref 11.7–14.2)
PSV: 8 CMH2O
RBC # BLD AUTO: 4.48 10*6/MM3 (ref 4.6–6)
RBC # BLD AUTO: 4.67 10*6/MM3 (ref 4.6–6)
SAO2 % BLDA: 100 % (ref 95–98)
SAO2 % BLDCOA: 99.1 % (ref 92–99)
SAO2 % BLDCOA: 99.9 % (ref 92–99)
SET MECH RESP RATE: 14
SET MECH RESP RATE: 16
SODIUM BLD-SCNC: 140 MMOL/L (ref 136–145)
SODIUM BLD-SCNC: 145 MMOL/L (ref 136–145)
TOTAL RATE: 14 BREATHS/MINUTE
TOTAL RATE: 16 BREATHS/MINUTE
VENTILATOR MODE: ABNORMAL
VENTILATOR MODE: ABNORMAL
VT ON VENT VENT: 500 ML
VT ON VENT VENT: 850 ML
WBC NRBC COR # BLD: 12.13 10*3/MM3 (ref 4.5–10.7)
WBC NRBC COR # BLD: 7.48 10*3/MM3 (ref 4.5–10.7)

## 2019-02-08 PROCEDURE — 5A1221Z PERFORMANCE OF CARDIAC OUTPUT, CONTINUOUS: ICD-10-PCS | Performed by: THORACIC SURGERY (CARDIOTHORACIC VASCULAR SURGERY)

## 2019-02-08 PROCEDURE — 25010000002 ALBUMIN HUMAN 5% PER 50 ML: Performed by: THORACIC SURGERY (CARDIOTHORACIC VASCULAR SURGERY)

## 2019-02-08 PROCEDURE — 06BQ4ZZ EXCISION OF LEFT SAPHENOUS VEIN, PERCUTANEOUS ENDOSCOPIC APPROACH: ICD-10-PCS | Performed by: THORACIC SURGERY (CARDIOTHORACIC VASCULAR SURGERY)

## 2019-02-08 PROCEDURE — 25010000002 FENTANYL CITRATE (PF) 100 MCG/2ML SOLUTION: Performed by: ANESTHESIOLOGY

## 2019-02-08 PROCEDURE — 82962 GLUCOSE BLOOD TEST: CPT

## 2019-02-08 PROCEDURE — C1751 CATH, INF, PER/CENT/MIDLINE: HCPCS | Performed by: ANESTHESIOLOGY

## 2019-02-08 PROCEDURE — 82803 BLOOD GASES ANY COMBINATION: CPT

## 2019-02-08 PROCEDURE — 25010000003 CEFAZOLIN IN DEXTROSE 2-4 GM/100ML-% SOLUTION: Performed by: THORACIC SURGERY (CARDIOTHORACIC VASCULAR SURGERY)

## 2019-02-08 PROCEDURE — 85610 PROTHROMBIN TIME: CPT | Performed by: THORACIC SURGERY (CARDIOTHORACIC VASCULAR SURGERY)

## 2019-02-08 PROCEDURE — 94799 UNLISTED PULMONARY SVC/PX: CPT

## 2019-02-08 PROCEDURE — P9041 ALBUMIN (HUMAN),5%, 50ML: HCPCS | Performed by: THORACIC SURGERY (CARDIOTHORACIC VASCULAR SURGERY)

## 2019-02-08 PROCEDURE — 83735 ASSAY OF MAGNESIUM: CPT | Performed by: THORACIC SURGERY (CARDIOTHORACIC VASCULAR SURGERY)

## 2019-02-08 PROCEDURE — 25010000002 MIDAZOLAM PER 1 MG: Performed by: ANESTHESIOLOGY

## 2019-02-08 PROCEDURE — 86901 BLOOD TYPING SEROLOGIC RH(D): CPT

## 2019-02-08 PROCEDURE — 80069 RENAL FUNCTION PANEL: CPT | Performed by: THORACIC SURGERY (CARDIOTHORACIC VASCULAR SURGERY)

## 2019-02-08 PROCEDURE — A4648 IMPLANTABLE TISSUE MARKER: HCPCS | Performed by: THORACIC SURGERY (CARDIOTHORACIC VASCULAR SURGERY)

## 2019-02-08 PROCEDURE — 0211093 BYPASS CORONARY ARTERY, TWO ARTERIES FROM CORONARY ARTERY WITH AUTOLOGOUS VENOUS TISSUE, OPEN APPROACH: ICD-10-PCS | Performed by: THORACIC SURGERY (CARDIOTHORACIC VASCULAR SURGERY)

## 2019-02-08 PROCEDURE — 86900 BLOOD TYPING SEROLOGIC ABO: CPT

## 2019-02-08 PROCEDURE — 25010000002 PROPOFOL 10 MG/ML EMULSION: Performed by: ANESTHESIOLOGY

## 2019-02-08 PROCEDURE — 25010000002 PAPAVERINE PER 60 MG: Performed by: THORACIC SURGERY (CARDIOTHORACIC VASCULAR SURGERY)

## 2019-02-08 PROCEDURE — 33533 CABG ARTERIAL SINGLE: CPT | Performed by: THORACIC SURGERY (CARDIOTHORACIC VASCULAR SURGERY)

## 2019-02-08 PROCEDURE — 25010000002 HEPARIN (PORCINE) PER 1000 UNITS

## 2019-02-08 PROCEDURE — 25010000003 CEFAZOLIN IN DEXTROSE 2-4 GM/100ML-% SOLUTION: Performed by: NURSE PRACTITIONER

## 2019-02-08 PROCEDURE — 85347 COAGULATION TIME ACTIVATED: CPT

## 2019-02-08 PROCEDURE — 25010000002 PROTAMINE SULFATE PER 10 MG: Performed by: ANESTHESIOLOGY

## 2019-02-08 PROCEDURE — C1729 CATH, DRAINAGE: HCPCS | Performed by: THORACIC SURGERY (CARDIOTHORACIC VASCULAR SURGERY)

## 2019-02-08 PROCEDURE — 85384 FIBRINOGEN ACTIVITY: CPT | Performed by: THORACIC SURGERY (CARDIOTHORACIC VASCULAR SURGERY)

## 2019-02-08 PROCEDURE — 25010000002 PHENYLEPHRINE PER 1 ML: Performed by: ANESTHESIOLOGY

## 2019-02-08 PROCEDURE — 93318 ECHO TRANSESOPHAGEAL INTRAOP: CPT

## 2019-02-08 PROCEDURE — C1713 ANCHOR/SCREW BN/BN,TIS/BN: HCPCS | Performed by: THORACIC SURGERY (CARDIOTHORACIC VASCULAR SURGERY)

## 2019-02-08 PROCEDURE — 33508 ENDOSCOPIC VEIN HARVEST: CPT | Performed by: THORACIC SURGERY (CARDIOTHORACIC VASCULAR SURGERY)

## 2019-02-08 PROCEDURE — 25010000002 ALBUMIN HUMAN 25% PER 50 ML

## 2019-02-08 PROCEDURE — 93005 ELECTROCARDIOGRAM TRACING: CPT | Performed by: THORACIC SURGERY (CARDIOTHORACIC VASCULAR SURGERY)

## 2019-02-08 PROCEDURE — P9047 ALBUMIN (HUMAN), 25%, 50ML: HCPCS

## 2019-02-08 PROCEDURE — 93318 ECHO TRANSESOPHAGEAL INTRAOP: CPT | Performed by: ANESTHESIOLOGY

## 2019-02-08 PROCEDURE — 84132 ASSAY OF SERUM POTASSIUM: CPT | Performed by: THORACIC SURGERY (CARDIOTHORACIC VASCULAR SURGERY)

## 2019-02-08 PROCEDURE — 25010000002 HEPARIN (PORCINE) PER 1000 UNITS: Performed by: THORACIC SURGERY (CARDIOTHORACIC VASCULAR SURGERY)

## 2019-02-08 PROCEDURE — 85730 THROMBOPLASTIN TIME PARTIAL: CPT | Performed by: THORACIC SURGERY (CARDIOTHORACIC VASCULAR SURGERY)

## 2019-02-08 PROCEDURE — 02100Z9 BYPASS CORONARY ARTERY, ONE ARTERY FROM LEFT INTERNAL MAMMARY, OPEN APPROACH: ICD-10-PCS | Performed by: THORACIC SURGERY (CARDIOTHORACIC VASCULAR SURGERY)

## 2019-02-08 PROCEDURE — 33518 CABG ARTERY-VEIN TWO: CPT | Performed by: THORACIC SURGERY (CARDIOTHORACIC VASCULAR SURGERY)

## 2019-02-08 PROCEDURE — 25010000002 MORPHINE PER 10 MG: Performed by: THORACIC SURGERY (CARDIOTHORACIC VASCULAR SURGERY)

## 2019-02-08 PROCEDURE — 94002 VENT MGMT INPAT INIT DAY: CPT

## 2019-02-08 PROCEDURE — 82947 ASSAY GLUCOSE BLOOD QUANT: CPT

## 2019-02-08 PROCEDURE — 85018 HEMOGLOBIN: CPT

## 2019-02-08 PROCEDURE — 85014 HEMATOCRIT: CPT

## 2019-02-08 PROCEDURE — 25010000002 MAGNESIUM SULFATE IN D5W 1G/100ML (PREMIX) 1-5 GM/100ML-% SOLUTION: Performed by: THORACIC SURGERY (CARDIOTHORACIC VASCULAR SURGERY)

## 2019-02-08 PROCEDURE — 3E083GC INTRODUCTION OF OTHER THERAPEUTIC SUBSTANCE INTO HEART, PERCUTANEOUS APPROACH: ICD-10-PCS | Performed by: THORACIC SURGERY (CARDIOTHORACIC VASCULAR SURGERY)

## 2019-02-08 PROCEDURE — 93010 ELECTROCARDIOGRAM REPORT: CPT | Performed by: INTERNAL MEDICINE

## 2019-02-08 PROCEDURE — 25010000002 HEPARIN (PORCINE) PER 1000 UNITS: Performed by: ANESTHESIOLOGY

## 2019-02-08 PROCEDURE — 85027 COMPLETE CBC AUTOMATED: CPT | Performed by: THORACIC SURGERY (CARDIOTHORACIC VASCULAR SURGERY)

## 2019-02-08 PROCEDURE — 71045 X-RAY EXAM CHEST 1 VIEW: CPT

## 2019-02-08 PROCEDURE — 85025 COMPLETE CBC W/AUTO DIFF WBC: CPT | Performed by: THORACIC SURGERY (CARDIOTHORACIC VASCULAR SURGERY)

## 2019-02-08 PROCEDURE — 63710000001 INSULIN REGULAR HUMAN PER 5 UNITS: Performed by: ANESTHESIOLOGY

## 2019-02-08 PROCEDURE — 82330 ASSAY OF CALCIUM: CPT | Performed by: THORACIC SURGERY (CARDIOTHORACIC VASCULAR SURGERY)

## 2019-02-08 PROCEDURE — 25010000002 METOCLOPRAMIDE PER 10 MG: Performed by: THORACIC SURGERY (CARDIOTHORACIC VASCULAR SURGERY)

## 2019-02-08 DEVICE — SS SUTURE, 3 PER SLEEVE
Type: IMPLANTABLE DEVICE | Site: STERNUM | Status: FUNCTIONAL
Brand: MYO/WIRE II

## 2019-02-08 DEVICE — SS SUTURE, 4 PER SLEEVE
Type: IMPLANTABLE DEVICE | Site: STERNUM | Status: FUNCTIONAL
Brand: MYO/WIRE II

## 2019-02-08 RX ORDER — MEPERIDINE HYDROCHLORIDE 25 MG/ML
25 INJECTION INTRAMUSCULAR; INTRAVENOUS; SUBCUTANEOUS EVERY 4 HOURS PRN
Status: DISCONTINUED | OUTPATIENT
Start: 2019-02-08 | End: 2019-02-09

## 2019-02-08 RX ORDER — ALPRAZOLAM 0.25 MG/1
0.25 TABLET ORAL EVERY 8 HOURS PRN
Status: DISCONTINUED | OUTPATIENT
Start: 2019-02-08 | End: 2019-02-13 | Stop reason: HOSPADM

## 2019-02-08 RX ORDER — SODIUM CHLORIDE, SODIUM LACTATE, POTASSIUM CHLORIDE, CALCIUM CHLORIDE 600; 310; 30; 20 MG/100ML; MG/100ML; MG/100ML; MG/100ML
9 INJECTION, SOLUTION INTRAVENOUS CONTINUOUS
Status: DISCONTINUED | OUTPATIENT
Start: 2019-02-08 | End: 2019-02-08

## 2019-02-08 RX ORDER — MIDAZOLAM HYDROCHLORIDE 1 MG/ML
INJECTION INTRAMUSCULAR; INTRAVENOUS AS NEEDED
Status: DISCONTINUED | OUTPATIENT
Start: 2019-02-08 | End: 2019-02-08 | Stop reason: SURG

## 2019-02-08 RX ORDER — PROPOFOL 10 MG/ML
VIAL (ML) INTRAVENOUS CONTINUOUS PRN
Status: DISCONTINUED | OUTPATIENT
Start: 2019-02-08 | End: 2019-02-08 | Stop reason: SURG

## 2019-02-08 RX ORDER — NOREPINEPHRINE BITARTRATE 1 MG/ML
INJECTION, SOLUTION INTRAVENOUS CONTINUOUS PRN
Status: DISCONTINUED | OUTPATIENT
Start: 2019-02-08 | End: 2019-02-08 | Stop reason: SURG

## 2019-02-08 RX ORDER — ALBUMIN, HUMAN INJ 5% 5 %
1500 SOLUTION INTRAVENOUS AS NEEDED
Status: DISCONTINUED | OUTPATIENT
Start: 2019-02-08 | End: 2019-02-09

## 2019-02-08 RX ORDER — SODIUM CHLORIDE 0.9 % (FLUSH) 0.9 %
1-10 SYRINGE (ML) INJECTION AS NEEDED
Status: DISCONTINUED | OUTPATIENT
Start: 2019-02-08 | End: 2019-02-08 | Stop reason: HOSPADM

## 2019-02-08 RX ORDER — BISACODYL 10 MG
10 SUPPOSITORY, RECTAL RECTAL DAILY PRN
Status: DISCONTINUED | OUTPATIENT
Start: 2019-02-09 | End: 2019-02-13 | Stop reason: HOSPADM

## 2019-02-08 RX ORDER — SODIUM CHLORIDE 9 MG/ML
30 INJECTION, SOLUTION INTRAVENOUS CONTINUOUS PRN
Status: DISCONTINUED | OUTPATIENT
Start: 2019-02-08 | End: 2019-02-09

## 2019-02-08 RX ORDER — SUFENTANIL CITRATE 50 UG/ML
INJECTION EPIDURAL; INTRAVENOUS AS NEEDED
Status: DISCONTINUED | OUTPATIENT
Start: 2019-02-08 | End: 2019-02-08 | Stop reason: SURG

## 2019-02-08 RX ORDER — CHLORHEXIDINE GLUCONATE 0.12 MG/ML
15 RINSE ORAL ONCE
Status: DISCONTINUED | OUTPATIENT
Start: 2019-02-08 | End: 2019-02-08 | Stop reason: HOSPADM

## 2019-02-08 RX ORDER — CEFAZOLIN SODIUM 2 G/100ML
2 INJECTION, SOLUTION INTRAVENOUS EVERY 8 HOURS
Status: COMPLETED | OUTPATIENT
Start: 2019-02-08 | End: 2019-02-10

## 2019-02-08 RX ORDER — SODIUM CHLORIDE 9 MG/ML
9 INJECTION, SOLUTION INTRAVENOUS CONTINUOUS
Status: DISCONTINUED | OUTPATIENT
Start: 2019-02-08 | End: 2019-02-08

## 2019-02-08 RX ORDER — POTASSIUM CHLORIDE 1.5 G/1.77G
40 POWDER, FOR SOLUTION ORAL AS NEEDED
Status: DISCONTINUED | OUTPATIENT
Start: 2019-02-08 | End: 2019-02-13 | Stop reason: HOSPADM

## 2019-02-08 RX ORDER — HEPARIN SODIUM 1000 [USP'U]/ML
INJECTION, SOLUTION INTRAVENOUS; SUBCUTANEOUS AS NEEDED
Status: DISCONTINUED | OUTPATIENT
Start: 2019-02-08 | End: 2019-02-08 | Stop reason: SURG

## 2019-02-08 RX ORDER — MIDAZOLAM HYDROCHLORIDE 1 MG/ML
2 INJECTION INTRAMUSCULAR; INTRAVENOUS
Status: DISCONTINUED | OUTPATIENT
Start: 2019-02-08 | End: 2019-02-09

## 2019-02-08 RX ORDER — POTASSIUM CHLORIDE 29.8 MG/ML
20 INJECTION INTRAVENOUS
Status: DISCONTINUED | OUTPATIENT
Start: 2019-02-08 | End: 2019-02-09

## 2019-02-08 RX ORDER — ROCURONIUM BROMIDE 10 MG/ML
INJECTION, SOLUTION INTRAVENOUS AS NEEDED
Status: DISCONTINUED | OUTPATIENT
Start: 2019-02-08 | End: 2019-02-08 | Stop reason: SURG

## 2019-02-08 RX ORDER — POTASSIUM CHLORIDE 7.45 MG/ML
10 INJECTION INTRAVENOUS
Status: DISCONTINUED | OUTPATIENT
Start: 2019-02-08 | End: 2019-02-13 | Stop reason: HOSPADM

## 2019-02-08 RX ORDER — ACETAMINOPHEN 650 MG/1
650 SUPPOSITORY RECTAL EVERY 4 HOURS PRN
Status: DISCONTINUED | OUTPATIENT
Start: 2019-02-08 | End: 2019-02-13 | Stop reason: HOSPADM

## 2019-02-08 RX ORDER — PROPOFOL 10 MG/ML
VIAL (ML) INTRAVENOUS AS NEEDED
Status: DISCONTINUED | OUTPATIENT
Start: 2019-02-08 | End: 2019-02-08 | Stop reason: SURG

## 2019-02-08 RX ORDER — ASPIRIN 81 MG/1
81 TABLET ORAL DAILY
Status: DISCONTINUED | OUTPATIENT
Start: 2019-02-09 | End: 2019-02-13 | Stop reason: HOSPADM

## 2019-02-08 RX ORDER — METOCLOPRAMIDE HYDROCHLORIDE 5 MG/ML
10 INJECTION INTRAMUSCULAR; INTRAVENOUS EVERY 6 HOURS
Status: DISCONTINUED | OUTPATIENT
Start: 2019-02-08 | End: 2019-02-09

## 2019-02-08 RX ORDER — PROMETHAZINE HYDROCHLORIDE 25 MG/ML
12.5 INJECTION, SOLUTION INTRAMUSCULAR; INTRAVENOUS EVERY 6 HOURS PRN
Status: DISCONTINUED | OUTPATIENT
Start: 2019-02-08 | End: 2019-02-13 | Stop reason: HOSPADM

## 2019-02-08 RX ORDER — ACETAMINOPHEN 325 MG/1
650 TABLET ORAL EVERY 4 HOURS PRN
Status: DISCONTINUED | OUTPATIENT
Start: 2019-02-08 | End: 2019-02-13 | Stop reason: HOSPADM

## 2019-02-08 RX ORDER — SODIUM CHLORIDE 9 MG/ML
INJECTION, SOLUTION INTRAVENOUS CONTINUOUS PRN
Status: DISCONTINUED | OUTPATIENT
Start: 2019-02-08 | End: 2019-02-08 | Stop reason: SURG

## 2019-02-08 RX ORDER — MORPHINE SULFATE 2 MG/ML
4 INJECTION, SOLUTION INTRAMUSCULAR; INTRAVENOUS
Status: DISCONTINUED | OUTPATIENT
Start: 2019-02-08 | End: 2019-02-09

## 2019-02-08 RX ORDER — SENNA AND DOCUSATE SODIUM 50; 8.6 MG/1; MG/1
2 TABLET, FILM COATED ORAL NIGHTLY
Status: DISCONTINUED | OUTPATIENT
Start: 2019-02-09 | End: 2019-02-13 | Stop reason: HOSPADM

## 2019-02-08 RX ORDER — OXYCODONE HYDROCHLORIDE 5 MG/1
10 TABLET ORAL EVERY 4 HOURS PRN
Status: DISCONTINUED | OUTPATIENT
Start: 2019-02-08 | End: 2019-02-13 | Stop reason: HOSPADM

## 2019-02-08 RX ORDER — MIDAZOLAM HYDROCHLORIDE 1 MG/ML
2 INJECTION INTRAMUSCULAR; INTRAVENOUS
Status: DISCONTINUED | OUTPATIENT
Start: 2019-02-08 | End: 2019-02-08 | Stop reason: HOSPADM

## 2019-02-08 RX ORDER — LIDOCAINE HYDROCHLORIDE 10 MG/ML
0.5 INJECTION, SOLUTION EPIDURAL; INFILTRATION; INTRACAUDAL; PERINEURAL ONCE AS NEEDED
Status: DISCONTINUED | OUTPATIENT
Start: 2019-02-08 | End: 2019-02-08 | Stop reason: HOSPADM

## 2019-02-08 RX ORDER — MORPHINE SULFATE 2 MG/ML
1 INJECTION, SOLUTION INTRAMUSCULAR; INTRAVENOUS EVERY 4 HOURS PRN
Status: DISCONTINUED | OUTPATIENT
Start: 2019-02-08 | End: 2019-02-09

## 2019-02-08 RX ORDER — ACETAMINOPHEN 500 MG
1000 TABLET ORAL ONCE
Status: COMPLETED | OUTPATIENT
Start: 2019-02-08 | End: 2019-02-08

## 2019-02-08 RX ORDER — MILRINONE LACTATE 0.2 MG/ML
.25-.75 INJECTION, SOLUTION INTRAVENOUS CONTINUOUS PRN
Status: DISCONTINUED | OUTPATIENT
Start: 2019-02-08 | End: 2019-02-09

## 2019-02-08 RX ORDER — NOREPINEPHRINE BIT/0.9 % NACL 8 MG/250ML
.02-.3 INFUSION BOTTLE (ML) INTRAVENOUS CONTINUOUS PRN
Status: DISCONTINUED | OUTPATIENT
Start: 2019-02-08 | End: 2019-02-09

## 2019-02-08 RX ORDER — NITROGLYCERIN 5 MG/ML
INJECTION, SOLUTION INTRAVENOUS AS NEEDED
Status: DISCONTINUED | OUTPATIENT
Start: 2019-02-08 | End: 2019-02-08 | Stop reason: SURG

## 2019-02-08 RX ORDER — NALOXONE HCL 0.4 MG/ML
0.4 VIAL (ML) INJECTION
Status: DISCONTINUED | OUTPATIENT
Start: 2019-02-08 | End: 2019-02-13 | Stop reason: HOSPADM

## 2019-02-08 RX ORDER — POTASSIUM CHLORIDE 750 MG/1
40 CAPSULE, EXTENDED RELEASE ORAL AS NEEDED
Status: DISCONTINUED | OUTPATIENT
Start: 2019-02-08 | End: 2019-02-13 | Stop reason: HOSPADM

## 2019-02-08 RX ORDER — PAPAVERINE HYDROCHLORIDE 30 MG/ML
INJECTION INTRAMUSCULAR; INTRAVENOUS AS NEEDED
Status: DISCONTINUED | OUTPATIENT
Start: 2019-02-08 | End: 2019-02-08 | Stop reason: HOSPADM

## 2019-02-08 RX ORDER — HEPARIN SODIUM 5000 [USP'U]/ML
INJECTION, SOLUTION INTRAVENOUS; SUBCUTANEOUS AS NEEDED
Status: DISCONTINUED | OUTPATIENT
Start: 2019-02-08 | End: 2019-02-08 | Stop reason: HOSPADM

## 2019-02-08 RX ORDER — CHLORHEXIDINE GLUCONATE 500 MG/1
1 CLOTH TOPICAL EVERY 12 HOURS PRN
Status: DISCONTINUED | OUTPATIENT
Start: 2019-02-08 | End: 2019-02-08 | Stop reason: HOSPADM

## 2019-02-08 RX ORDER — MAGNESIUM SULFATE 1 G/100ML
1 INJECTION INTRAVENOUS EVERY 8 HOURS
Status: DISPENSED | OUTPATIENT
Start: 2019-02-08 | End: 2019-02-09

## 2019-02-08 RX ORDER — DOPAMINE HYDROCHLORIDE 160 MG/100ML
2-20 INJECTION, SOLUTION INTRAVENOUS CONTINUOUS PRN
Status: DISCONTINUED | OUTPATIENT
Start: 2019-02-08 | End: 2019-02-09

## 2019-02-08 RX ORDER — EPHEDRINE SULFATE 50 MG/ML
INJECTION, SOLUTION INTRAVENOUS AS NEEDED
Status: DISCONTINUED | OUTPATIENT
Start: 2019-02-08 | End: 2019-02-08 | Stop reason: SURG

## 2019-02-08 RX ORDER — PROTAMINE SULFATE 10 MG/ML
INJECTION, SOLUTION INTRAVENOUS AS NEEDED
Status: DISCONTINUED | OUTPATIENT
Start: 2019-02-08 | End: 2019-02-08 | Stop reason: SURG

## 2019-02-08 RX ORDER — HYDROCODONE BITARTRATE AND ACETAMINOPHEN 5; 325 MG/1; MG/1
2 TABLET ORAL EVERY 4 HOURS PRN
Status: DISCONTINUED | OUTPATIENT
Start: 2019-02-08 | End: 2019-02-13 | Stop reason: HOSPADM

## 2019-02-08 RX ORDER — CEFAZOLIN SODIUM 2 G/100ML
2 INJECTION, SOLUTION INTRAVENOUS
Status: COMPLETED | OUTPATIENT
Start: 2019-02-08 | End: 2019-02-08

## 2019-02-08 RX ORDER — FAMOTIDINE 10 MG/ML
20 INJECTION, SOLUTION INTRAVENOUS ONCE
Status: COMPLETED | OUTPATIENT
Start: 2019-02-08 | End: 2019-02-08

## 2019-02-08 RX ORDER — SODIUM CHLORIDE 9 MG/ML
30 INJECTION, SOLUTION INTRAVENOUS CONTINUOUS
Status: DISCONTINUED | OUTPATIENT
Start: 2019-02-08 | End: 2019-02-09

## 2019-02-08 RX ORDER — NITROGLYCERIN 20 MG/100ML
10-50 INJECTION INTRAVENOUS
Status: DISCONTINUED | OUTPATIENT
Start: 2019-02-08 | End: 2019-02-09

## 2019-02-08 RX ORDER — ONDANSETRON 2 MG/ML
4 INJECTION INTRAMUSCULAR; INTRAVENOUS EVERY 6 HOURS PRN
Status: DISCONTINUED | OUTPATIENT
Start: 2019-02-08 | End: 2019-02-12

## 2019-02-08 RX ORDER — BISACODYL 5 MG/1
10 TABLET, DELAYED RELEASE ORAL DAILY PRN
Status: DISCONTINUED | OUTPATIENT
Start: 2019-02-08 | End: 2019-02-13 | Stop reason: HOSPADM

## 2019-02-08 RX ORDER — SODIUM CHLORIDE 0.9 % (FLUSH) 0.9 %
30 SYRINGE (ML) INJECTION ONCE AS NEEDED
Status: DISCONTINUED | OUTPATIENT
Start: 2019-02-08 | End: 2019-02-13 | Stop reason: HOSPADM

## 2019-02-08 RX ORDER — FENTANYL CITRATE 50 UG/ML
50 INJECTION, SOLUTION INTRAMUSCULAR; INTRAVENOUS
Status: DISCONTINUED | OUTPATIENT
Start: 2019-02-08 | End: 2019-02-08 | Stop reason: HOSPADM

## 2019-02-08 RX ORDER — MIDAZOLAM HYDROCHLORIDE 1 MG/ML
1 INJECTION INTRAMUSCULAR; INTRAVENOUS
Status: DISCONTINUED | OUTPATIENT
Start: 2019-02-08 | End: 2019-02-08 | Stop reason: HOSPADM

## 2019-02-08 RX ORDER — ATORVASTATIN CALCIUM 20 MG/1
40 TABLET, FILM COATED ORAL NIGHTLY
Status: DISCONTINUED | OUTPATIENT
Start: 2019-02-08 | End: 2019-02-13 | Stop reason: HOSPADM

## 2019-02-08 RX ORDER — CHLORHEXIDINE GLUCONATE 0.12 MG/ML
15 RINSE ORAL EVERY 12 HOURS
Status: DISCONTINUED | OUTPATIENT
Start: 2019-02-08 | End: 2019-02-11

## 2019-02-08 RX ORDER — PROMETHAZINE HYDROCHLORIDE 25 MG/1
12.5 TABLET ORAL EVERY 6 HOURS PRN
Status: DISCONTINUED | OUTPATIENT
Start: 2019-02-08 | End: 2019-02-13 | Stop reason: HOSPADM

## 2019-02-08 RX ORDER — CYCLOBENZAPRINE HCL 10 MG
10 TABLET ORAL EVERY 8 HOURS PRN
Status: DISCONTINUED | OUTPATIENT
Start: 2019-02-09 | End: 2019-02-13 | Stop reason: HOSPADM

## 2019-02-08 RX ORDER — FUROSEMIDE 10 MG/ML
40 INJECTION INTRAMUSCULAR; INTRAVENOUS EVERY 6 HOURS PRN
Status: DISCONTINUED | OUTPATIENT
Start: 2019-02-08 | End: 2019-02-13 | Stop reason: HOSPADM

## 2019-02-08 RX ORDER — AMINOCAPROIC ACID 250 MG/ML
INJECTION, SOLUTION INTRAVENOUS AS NEEDED
Status: DISCONTINUED | OUTPATIENT
Start: 2019-02-08 | End: 2019-02-08 | Stop reason: SURG

## 2019-02-08 RX ORDER — GABAPENTIN 300 MG/1
600 CAPSULE ORAL ONCE
Status: COMPLETED | OUTPATIENT
Start: 2019-02-08 | End: 2019-02-08

## 2019-02-08 RX ADMIN — ACETAMINOPHEN 650 MG: 325 TABLET, FILM COATED ORAL at 22:02

## 2019-02-08 RX ADMIN — HEPARIN SODIUM 28000 UNITS: 1000 INJECTION, SOLUTION INTRAVENOUS; SUBCUTANEOUS at 12:17

## 2019-02-08 RX ADMIN — METOPROLOL TARTRATE 12.5 MG: 25 TABLET ORAL at 06:51

## 2019-02-08 RX ADMIN — SODIUM CHLORIDE 30 ML/HR: 9 INJECTION, SOLUTION INTRAVENOUS at 14:30

## 2019-02-08 RX ADMIN — CEFAZOLIN SODIUM 2 G: 2 INJECTION, SOLUTION INTRAVENOUS at 11:37

## 2019-02-08 RX ADMIN — MAGNESIUM SULFATE HEPTAHYDRATE 1 G: 1 INJECTION, SOLUTION INTRAVENOUS at 17:25

## 2019-02-08 RX ADMIN — Medication 0.5 MCG/KG/HR: at 14:30

## 2019-02-08 RX ADMIN — FENTANYL CITRATE 50 MCG: 50 INJECTION, SOLUTION INTRAMUSCULAR; INTRAVENOUS at 09:19

## 2019-02-08 RX ADMIN — PROTAMINE SULFATE 300 MG: 10 INJECTION, SOLUTION INTRAVENOUS at 13:35

## 2019-02-08 RX ADMIN — CEFAZOLIN SODIUM 2 G: 2 INJECTION, SOLUTION INTRAVENOUS at 13:42

## 2019-02-08 RX ADMIN — SODIUM CHLORIDE 2.8 UNITS/HR: 900 INJECTION, SOLUTION INTRAVENOUS at 12:51

## 2019-02-08 RX ADMIN — FENTANYL CITRATE 50 MCG: 50 INJECTION, SOLUTION INTRAMUSCULAR; INTRAVENOUS at 09:06

## 2019-02-08 RX ADMIN — MUPIROCIN 10 APPLICATION: 20 OINTMENT TOPICAL at 21:07

## 2019-02-08 RX ADMIN — MIDAZOLAM 1 MG: 1 INJECTION INTRAMUSCULAR; INTRAVENOUS at 09:19

## 2019-02-08 RX ADMIN — ACETAMINOPHEN 1000 MG: 500 TABLET, FILM COATED ORAL at 06:51

## 2019-02-08 RX ADMIN — OXYCODONE 10 MG: 5 TABLET ORAL at 22:02

## 2019-02-08 RX ADMIN — ROCURONIUM BROMIDE 50 MG: 10 INJECTION INTRAVENOUS at 11:13

## 2019-02-08 RX ADMIN — GABAPENTIN 600 MG: 300 CAPSULE ORAL at 06:51

## 2019-02-08 RX ADMIN — AMINOCAPROIC ACID 10 G: 250 INJECTION, SOLUTION INTRAVENOUS at 13:44

## 2019-02-08 RX ADMIN — PROPOFOL 50 MCG/KG/MIN: 10 INJECTION, EMULSION INTRAVENOUS at 12:17

## 2019-02-08 RX ADMIN — MORPHINE SULFATE 4 MG: 2 INJECTION, SOLUTION INTRAMUSCULAR; INTRAVENOUS at 15:15

## 2019-02-08 RX ADMIN — METOCLOPRAMIDE 10 MG: 5 INJECTION, SOLUTION INTRAMUSCULAR; INTRAVENOUS at 15:40

## 2019-02-08 RX ADMIN — ALBUMIN (HUMAN) 500 ML: 12.5 SOLUTION INTRAVENOUS at 19:00

## 2019-02-08 RX ADMIN — CHLORHEXIDINE GLUCONATE 15 ML: 1.2 RINSE ORAL at 21:07

## 2019-02-08 RX ADMIN — SUFENTANIL CITRATE 50 MCG: 50 INJECTION, SOLUTION EPIDURAL; INTRAVENOUS at 11:55

## 2019-02-08 RX ADMIN — SODIUM CHLORIDE 9 ML: 9 INJECTION, SOLUTION INTRAVENOUS at 08:54

## 2019-02-08 RX ADMIN — NOREPINEPHRINE BITARTRATE 0.05 MCG/KG/MIN: 1 INJECTION, SOLUTION, CONCENTRATE INTRAVENOUS at 13:30

## 2019-02-08 RX ADMIN — EPHEDRINE SULFATE 10 MG: 50 INJECTION INTRAMUSCULAR; INTRAVENOUS; SUBCUTANEOUS at 12:14

## 2019-02-08 RX ADMIN — PROPOFOL 50 MG: 10 INJECTION, EMULSION INTRAVENOUS at 11:12

## 2019-02-08 RX ADMIN — CEFAZOLIN SODIUM 2 G: 2 INJECTION, SOLUTION INTRAVENOUS at 21:07

## 2019-02-08 RX ADMIN — MORPHINE SULFATE 4 MG: 2 INJECTION, SOLUTION INTRAMUSCULAR; INTRAVENOUS at 17:50

## 2019-02-08 RX ADMIN — MORPHINE SULFATE 4 MG: 2 INJECTION, SOLUTION INTRAMUSCULAR; INTRAVENOUS at 18:44

## 2019-02-08 RX ADMIN — Medication 0.3 MCG/KG/HR: at 16:00

## 2019-02-08 RX ADMIN — AMINOCAPROIC ACID 10 G: 250 INJECTION, SOLUTION INTRAVENOUS at 11:39

## 2019-02-08 RX ADMIN — NICARDIPINE HYDROCHLORIDE 4 MG/HR: 2.5 INJECTION INTRAVENOUS at 15:00

## 2019-02-08 RX ADMIN — METOCLOPRAMIDE 10 MG: 5 INJECTION, SOLUTION INTRAMUSCULAR; INTRAVENOUS at 21:07

## 2019-02-08 RX ADMIN — Medication 2 MG: at 11:07

## 2019-02-08 RX ADMIN — PHENYLEPHRINE HYDROCHLORIDE 100 MCG: 10 INJECTION INTRAVENOUS at 12:16

## 2019-02-08 RX ADMIN — ROCURONIUM BROMIDE 20 MG: 10 INJECTION INTRAVENOUS at 13:14

## 2019-02-08 RX ADMIN — SODIUM CHLORIDE 1 MCG/KG/HR: 900 INJECTION, SOLUTION INTRAVENOUS at 11:06

## 2019-02-08 RX ADMIN — SODIUM CHLORIDE: 9 INJECTION, SOLUTION INTRAVENOUS at 11:09

## 2019-02-08 RX ADMIN — SUFENTANIL CITRATE 25 MCG: 50 INJECTION, SOLUTION EPIDURAL; INTRAVENOUS at 11:12

## 2019-02-08 RX ADMIN — MIDAZOLAM 1 MG: 1 INJECTION INTRAMUSCULAR; INTRAVENOUS at 09:20

## 2019-02-08 RX ADMIN — MIDAZOLAM 1 MG: 1 INJECTION INTRAMUSCULAR; INTRAVENOUS at 09:06

## 2019-02-08 RX ADMIN — NITROGLYCERIN 100 MCG: 5 INJECTION, SOLUTION INTRAVENOUS at 11:58

## 2019-02-08 RX ADMIN — SUFENTANIL CITRATE 50 MCG: 50 INJECTION, SOLUTION EPIDURAL; INTRAVENOUS at 12:24

## 2019-02-08 RX ADMIN — FAMOTIDINE 20 MG: 10 INJECTION, SOLUTION INTRAVENOUS at 08:49

## 2019-02-08 RX ADMIN — ATORVASTATIN CALCIUM 40 MG: 20 TABLET, FILM COATED ORAL at 21:07

## 2019-02-08 RX ADMIN — PHENYLEPHRINE HYDROCHLORIDE 0.5 MCG/KG/MIN: 10 INJECTION, SOLUTION INTRAMUSCULAR; INTRAVENOUS; SUBCUTANEOUS at 11:29

## 2019-02-08 RX ADMIN — SUFENTANIL CITRATE 25 MCG: 50 INJECTION, SOLUTION EPIDURAL; INTRAVENOUS at 11:46

## 2019-02-08 NOTE — ANESTHESIA PROCEDURE NOTES
Central Line    Pre-sedation assessment completed: 2/8/2019 10:00 AM    Patient reassessed immediately prior to procedure    Patient location during procedure: holding area  Start time: 2/8/2019 10:00 AM  Stop Time:2/8/2019 10:25 AM  Indications: vascular access and MD/Surgeon request  Staff  Anesthesiologist: Nick Haynes MD  Preanesthetic Checklist  Completed: patient identified, site marked, surgical consent, pre-op evaluation, timeout performed, IV checked, risks and benefits discussed and monitors and equipment checked  Central Line Prep  Sterile Tech:cap, gloves, gown, mask and sterile barriers  Prep: Betadine  Patient monitoring: blood pressure monitoring, continuous pulse oximetry and EKG  Central Line Procedure  Laterality:right  Location:internal jugular  Catheter Type:Americus-Salome and Cordis  Catheter Size:9 Fr  Guidance:ultrasound guided and landmark technique  PROCEDURE NOTE/ULTRASOUND INTERPRETATION.  Using ultrasound guidance the potential vascular sites for insertion of the catheter were visualized to determine the patency of the vessel to be used for vascular access.  After selecting the appropriate site for insertion, the needle was visualized under ultrasound being inserted into the internal jugular vein, followed by ultrasound confirmation of wire and catheter placement. There were no abnormalities seen on ultrasound; an image was taken; and the patient tolerated the procedure with no complications.   Assessment  Post procedure:biopatch applied, line sutured and occlusive dressing applied  Assessement:blood return through all ports, free fluid flow and chest x-ray ordered  Complications:no  Patient Tolerance:patient tolerated the procedure well with no apparent complications

## 2019-02-08 NOTE — ANESTHESIA PROCEDURE NOTES
Procedure Performed: Emergent/Open-Heart Anesthesia THANG     Start Time:        End Time:

## 2019-02-08 NOTE — OP NOTE
Operative Note    Date of Dictation: 02/08/19    Date of Procedure: Same    Referring Physician: Nathaniel Crawford MD    Preoperative diagnosis:   1. Multivessel CAD  2. Angina  3. PVD    Postoperative diagnosis:   Same    Procedure:   1. CABG x 3 with LIMA-LAD, SVGs to OM1 and PDA  2. EVH of the right legs    Surgeon: Nathaniel Crawford MD     Assistants: ALYSSA Willams  and Brijesh Ulloa M.D.  Anesthesia: General endotracheal anesthesia and THANG    Findings:  The saphenous vein was harvested endoscopically form the left  leg. The vein had a diameter of 3.5 and 4 mm and was of good quality. The coronaries had diameters between 1.5 and 2.5 mm.    Estimated Blood Loss:  Documented in the anesthesia record    STS Data:    The patient was explained the risks (STS risk score calculated), benefits and alternatives of surgery and agreed to proceed. The antibiotics and b blockers were given in the STS required window.        Description of the procedure:     The patient was placed supine on the operative table. General anesthesia was given and lines placed. The patient was prepped and draped using the usual sterile technique. A median sternotomy was performed with a scalpel and the layers carried down to the sternum using the electrocautery. The sternum was split in the midline using a vertical oscillating saw. Hemostasis was achieved. The LIMA was harvested as a pedicle and prepared with papaverine. It was of good quality. 300 units/kg of IV heparin was given to an ACT over 400. A Favaloro retractor was placed and the mediastinum exposed, the pericardium was opened and the edges tacked to the wound. Cannulation sutures were placed in the ascending aorta and right atrium. Small cannulas were placed and aorto right atrial cardiopulmonary bypass was started. Cardioplegia cannulas were placed and then the ascending aorta was clamped. One liter of cold blood cardioplegia was given in an antegrade fashion to achieved  diastolic arrest and further doses every 15 minutes thereafter. Constructions of grafts were done in the sequence distal - proximal between the reversed saphenous vein graft and each coronary targets. Grafts were constructed to the PDA and OM1 coronary arteries and plegia was given between graft sequences after de-airing the aortic root and tying the proximal anastomosis. The LIMA was anastomosed to the mid LAD using 7.0 prolene continuous suture with a small needle, then the warm dose of cardioplegia was given and then the aortic clamp removed as well as the LIMA bulldog clamp. All anastomoses were checked and had good flow and morphology. The left pleural space was suctioned and the lungs ventilated. The heart was paced till regular atrial rhythm resumed. I allowed the heart to eject and once hemodynamics were acceptable, then the CPB was discontinued and the venous and cardioplegia cannulas removed. The matching dose of protamine was given and the aortic cannula removed as well. AV temporary wires and pleural and mediastinal chest tubes were placed and the wound sprayed with platelet rich plasma.  The sternum was closed with single and double wires and soft tissue in layers of reabsorbable material. The wounds were covered with sterile dressings.       Specimen removed: None    CPB time:  55 min    Aortic clamp time:  40 min    Complications:  none           Disposition: Cardiovascular recovery room           Condition: Critical but stable.

## 2019-02-08 NOTE — ANESTHESIA PROCEDURE NOTES
Arterial Line    Pre-sedation assessment completed: 2/8/2019 9:50 AM    Patient reassessed immediately prior to procedure    Patient location during procedure: holding area  Start time: 2/8/2019 9:50 AM  Stop Time:2/8/2019 10:00 AM       Line placed for hemodynamic monitoring.  Performed By   Anesthesiologist: Nick Haynes MD  Preanesthetic Checklist  Completed: patient identified, surgical consent, pre-op evaluation, timeout performed, IV checked, risks and benefits discussed and monitors and equipment checked  Arterial Line Prep   Sterile Tech: cap, gloves, gown, mask and sterile barriers  Prep: ChloraPrep  Patient monitoring: blood pressure monitoring, continuous pulse oximetry and EKG  Arterial Line Procedure   Laterality:left  Location:  radial artery  Catheter size: 20 G   Guidance: landmark technique and palpation technique  PROCEDURE NOTE/ULTRASOUND INTERPRETATION.  Using ultrasound guidance the potential vascular sites for insertion of the catheter were visualized to determine the patency of the vessel to be used for vascular access.  After selecting the appropriate site for insertion, the needle was visualized under ultrasound being inserted into the radial artery, followed by ultrasound confirmation of wire and catheter placement. There were no abnormalities seen on ultrasound; an image was taken; and the patient tolerated the procedure with no complications.   Number of attempts: 1  Successful placement: yes          Post Assessment   Dressing Type: occlusive dressing applied, secured with tape and wrist guard applied.   Complications no  Circ/Move/Sens Assessment: normal.   Patient Tolerance: patient tolerated the procedure well with no apparent complications

## 2019-02-08 NOTE — ANESTHESIA PROCEDURE NOTES
Procedure Performed: Emergent/Open-Heart Anesthesia THANG     Start Time:        End Time:        General Procedure Information  Physician Requesting Echo: Nathaniel Crawford MD  Location performed:  OR  Intubated  Bite block not placed  Heart visualized  Probe Insertion:  Easy  Modalities:  2D only, color flow mapping and continuous wave Doppler    Echocardiographic and Doppler Measurements    Ventricles    Right Ventricle:  Cavity size normal.  Global function normal.    Left Ventricle:  Cavity size normal.  Hypertrophy not present.  Global Function normal.  Ejection Fraction 60%.          Valves    Aortic Valve:  Annulus normal.  Stenosis not present.  Regurgitation absent.  Leaflets calcified and thickened.  Leaflet motions normal.      Mitral Valve:  Annulus normal.  Stenosis not present.  Regurgitation trace.      Tricuspid Valve:  Annulus normal.    Pulmonic Valve:  Annulus normal.          Aorta    Ascending Aorta:  Size normal.          Atria    Right Atrium:  Size normal.    Left Atrium:  Size normal.  Left atrial appendage normal.      Septa    Atrial Septum:  Intra-atrial septal morphology normal.                  Anesthesia Information  Performed Personally      Echocardiogram Comments:       Diagnostic THANG.  Diagnosis: non-rheumatic mitral regurgitation

## 2019-02-08 NOTE — ANESTHESIA PROCEDURE NOTES
Airway  Airway not difficult    General Information and Staff    Patient location during procedure: OR  Anesthesiologist: Vishal Bliss MD    Indications and Patient Condition    Preoxygenated: yes  Mask difficulty assessment: 2 - vent by mask + OA or adjuvant +/- NMBA    Final Airway Details  Final airway type: endotracheal airway      Successful airway: ETT  Cuffed: yes   Successful intubation technique: direct laryngoscopy  Facilitating devices/methods: intubating stylet  Endotracheal tube insertion site: oral  Blade: Velásquez  Blade size: 3  ETT size (mm): 8.0  Cormack-Lehane Classification: grade IIa - partial view of glottis  Placement verified by: capnometry   Measured from: teeth  ETT to teeth (cm): 22  Number of attempts at approach: 1    Additional Comments  Atraumatic

## 2019-02-09 ENCOUNTER — APPOINTMENT (OUTPATIENT)
Dept: GENERAL RADIOLOGY | Facility: HOSPITAL | Age: 69
End: 2019-02-09

## 2019-02-09 LAB
ALBUMIN SERPL-MCNC: 4.1 G/DL (ref 3.5–5.2)
ANION GAP SERPL CALCULATED.3IONS-SCNC: 13.5 MMOL/L
BASOPHILS # BLD AUTO: 0.04 10*3/MM3 (ref 0–0.2)
BASOPHILS NFR BLD AUTO: 0.4 % (ref 0–1.5)
BUN BLD-MCNC: 13 MG/DL (ref 8–23)
BUN/CREAT SERPL: 13.5 (ref 7–25)
CALCIUM SPEC-SCNC: 8.2 MG/DL (ref 8.6–10.5)
CHLORIDE SERPL-SCNC: 110 MMOL/L (ref 98–107)
CO2 SERPL-SCNC: 22.5 MMOL/L (ref 22–29)
CREAT BLD-MCNC: 0.96 MG/DL (ref 0.76–1.27)
DEPRECATED RDW RBC AUTO: 44.4 FL (ref 37–54)
EOSINOPHIL # BLD AUTO: 0.06 10*3/MM3 (ref 0–0.7)
EOSINOPHIL NFR BLD AUTO: 0.6 % (ref 0.3–6.2)
ERYTHROCYTE [DISTWIDTH] IN BLOOD BY AUTOMATED COUNT: 13.7 % (ref 11.5–14.5)
GFR SERPL CREATININE-BSD FRML MDRD: 78 ML/MIN/1.73
GLUCOSE BLD-MCNC: 121 MG/DL (ref 65–99)
GLUCOSE BLDC GLUCOMTR-MCNC: 111 MG/DL (ref 70–130)
GLUCOSE BLDC GLUCOMTR-MCNC: 111 MG/DL (ref 70–130)
GLUCOSE BLDC GLUCOMTR-MCNC: 114 MG/DL (ref 70–130)
GLUCOSE BLDC GLUCOMTR-MCNC: 119 MG/DL (ref 70–130)
GLUCOSE BLDC GLUCOMTR-MCNC: 119 MG/DL (ref 70–130)
GLUCOSE BLDC GLUCOMTR-MCNC: 130 MG/DL (ref 70–130)
GLUCOSE BLDC GLUCOMTR-MCNC: 157 MG/DL (ref 70–130)
GLUCOSE BLDC GLUCOMTR-MCNC: 230 MG/DL (ref 70–130)
GLUCOSE BLDC GLUCOMTR-MCNC: 242 MG/DL (ref 70–130)
HCT VFR BLD AUTO: 42.1 % (ref 40.4–52.2)
HGB BLD-MCNC: 13.1 G/DL (ref 13.7–17.6)
IMM GRANULOCYTES # BLD AUTO: 0 10*3/MM3 (ref 0–0.03)
IMM GRANULOCYTES NFR BLD AUTO: 0 % (ref 0–0.5)
INR PPP: 1.15 (ref 0.9–1.1)
LYMPHOCYTES # BLD AUTO: 0.9 10*3/MM3 (ref 0.9–4.8)
LYMPHOCYTES NFR BLD AUTO: 9.5 % (ref 19.6–45.3)
MAGNESIUM SERPL-MCNC: 2.5 MG/DL (ref 1.6–2.4)
MCH RBC QN AUTO: 27.6 PG (ref 27–32.7)
MCHC RBC AUTO-ENTMCNC: 31.1 G/DL (ref 32.6–36.4)
MCV RBC AUTO: 88.8 FL (ref 79.8–96.2)
MONOCYTES # BLD AUTO: 0.69 10*3/MM3 (ref 0.2–1.2)
MONOCYTES NFR BLD AUTO: 7.3 % (ref 5–12)
NEUTROPHILS # BLD AUTO: 7.78 10*3/MM3 (ref 1.9–8.1)
NEUTROPHILS NFR BLD AUTO: 82.2 % (ref 42.7–76)
PHOSPHATE SERPL-MCNC: 5.3 MG/DL (ref 2.5–4.5)
PLATELET # BLD AUTO: 146 10*3/MM3 (ref 140–500)
PMV BLD AUTO: 9.8 FL (ref 6–12)
POTASSIUM BLD-SCNC: 4.2 MMOL/L (ref 3.5–5.2)
PROTHROMBIN TIME: 14.5 SECONDS (ref 11.7–14.2)
RBC # BLD AUTO: 4.74 10*6/MM3 (ref 4.6–6)
SODIUM BLD-SCNC: 146 MMOL/L (ref 136–145)
WBC NRBC COR # BLD: 9.47 10*3/MM3 (ref 4.5–10.7)

## 2019-02-09 PROCEDURE — 71045 X-RAY EXAM CHEST 1 VIEW: CPT

## 2019-02-09 PROCEDURE — 25010000003 CEFAZOLIN IN DEXTROSE 2-4 GM/100ML-% SOLUTION: Performed by: THORACIC SURGERY (CARDIOTHORACIC VASCULAR SURGERY)

## 2019-02-09 PROCEDURE — 25010000002 ENOXAPARIN PER 10 MG: Performed by: THORACIC SURGERY (CARDIOTHORACIC VASCULAR SURGERY)

## 2019-02-09 PROCEDURE — 82962 GLUCOSE BLOOD TEST: CPT

## 2019-02-09 PROCEDURE — 83735 ASSAY OF MAGNESIUM: CPT | Performed by: THORACIC SURGERY (CARDIOTHORACIC VASCULAR SURGERY)

## 2019-02-09 PROCEDURE — 93005 ELECTROCARDIOGRAM TRACING: CPT | Performed by: THORACIC SURGERY (CARDIOTHORACIC VASCULAR SURGERY)

## 2019-02-09 PROCEDURE — 85025 COMPLETE CBC W/AUTO DIFF WBC: CPT | Performed by: THORACIC SURGERY (CARDIOTHORACIC VASCULAR SURGERY)

## 2019-02-09 PROCEDURE — 85610 PROTHROMBIN TIME: CPT | Performed by: THORACIC SURGERY (CARDIOTHORACIC VASCULAR SURGERY)

## 2019-02-09 PROCEDURE — 25010000002 FUROSEMIDE PER 20 MG: Performed by: THORACIC SURGERY (CARDIOTHORACIC VASCULAR SURGERY)

## 2019-02-09 PROCEDURE — 80069 RENAL FUNCTION PANEL: CPT | Performed by: THORACIC SURGERY (CARDIOTHORACIC VASCULAR SURGERY)

## 2019-02-09 PROCEDURE — 63710000001 INSULIN LISPRO (HUMAN) PER 5 UNITS: Performed by: THORACIC SURGERY (CARDIOTHORACIC VASCULAR SURGERY)

## 2019-02-09 PROCEDURE — 25010000002 MAGNESIUM SULFATE IN D5W 1G/100ML (PREMIX) 1-5 GM/100ML-% SOLUTION: Performed by: THORACIC SURGERY (CARDIOTHORACIC VASCULAR SURGERY)

## 2019-02-09 PROCEDURE — 99222 1ST HOSP IP/OBS MODERATE 55: CPT | Performed by: INTERNAL MEDICINE

## 2019-02-09 PROCEDURE — P9041 ALBUMIN (HUMAN),5%, 50ML: HCPCS | Performed by: THORACIC SURGERY (CARDIOTHORACIC VASCULAR SURGERY)

## 2019-02-09 PROCEDURE — 93010 ELECTROCARDIOGRAM REPORT: CPT | Performed by: INTERNAL MEDICINE

## 2019-02-09 PROCEDURE — 25010000002 ALBUMIN HUMAN 5% PER 50 ML: Performed by: THORACIC SURGERY (CARDIOTHORACIC VASCULAR SURGERY)

## 2019-02-09 RX ORDER — NICOTINE POLACRILEX 4 MG
15 LOZENGE BUCCAL
Status: DISCONTINUED | OUTPATIENT
Start: 2019-02-09 | End: 2019-02-13 | Stop reason: HOSPADM

## 2019-02-09 RX ORDER — DEXTROSE MONOHYDRATE 25 G/50ML
25 INJECTION, SOLUTION INTRAVENOUS
Status: DISCONTINUED | OUTPATIENT
Start: 2019-02-09 | End: 2019-02-13 | Stop reason: HOSPADM

## 2019-02-09 RX ORDER — LEVOTHYROXINE SODIUM 175 UG/1
175 TABLET ORAL DAILY
Status: DISCONTINUED | OUTPATIENT
Start: 2019-02-09 | End: 2019-02-13 | Stop reason: HOSPADM

## 2019-02-09 RX ORDER — FUROSEMIDE 10 MG/ML
20 INJECTION INTRAMUSCULAR; INTRAVENOUS ONCE
Status: COMPLETED | OUTPATIENT
Start: 2019-02-09 | End: 2019-02-09

## 2019-02-09 RX ADMIN — ASPIRIN 81 MG: 81 TABLET, DELAYED RELEASE ORAL at 09:40

## 2019-02-09 RX ADMIN — MAGNESIUM SULFATE HEPTAHYDRATE 1 G: 1 INJECTION, SOLUTION INTRAVENOUS at 00:16

## 2019-02-09 RX ADMIN — OXYCODONE 10 MG: 5 TABLET ORAL at 03:08

## 2019-02-09 RX ADMIN — ACETAMINOPHEN 650 MG: 325 TABLET, FILM COATED ORAL at 16:45

## 2019-02-09 RX ADMIN — CEFAZOLIN SODIUM 2 G: 2 INJECTION, SOLUTION INTRAVENOUS at 05:09

## 2019-02-09 RX ADMIN — CHLORHEXIDINE GLUCONATE 15 ML: 1.2 RINSE ORAL at 21:14

## 2019-02-09 RX ADMIN — OXYCODONE 10 MG: 5 TABLET ORAL at 21:04

## 2019-02-09 RX ADMIN — ENOXAPARIN SODIUM 40 MG: 40 INJECTION SUBCUTANEOUS at 16:46

## 2019-02-09 RX ADMIN — OXYCODONE 10 MG: 5 TABLET ORAL at 16:45

## 2019-02-09 RX ADMIN — OXYCODONE 10 MG: 5 TABLET ORAL at 07:34

## 2019-02-09 RX ADMIN — METOPROLOL TARTRATE 12.5 MG: 25 TABLET ORAL at 21:16

## 2019-02-09 RX ADMIN — METOPROLOL TARTRATE 12.5 MG: 25 TABLET ORAL at 09:40

## 2019-02-09 RX ADMIN — MUPIROCIN 1 APPLICATION: 20 OINTMENT TOPICAL at 21:14

## 2019-02-09 RX ADMIN — LEVOTHYROXINE SODIUM 175 MCG: 175 TABLET ORAL at 12:16

## 2019-02-09 RX ADMIN — FUROSEMIDE 20 MG: 10 INJECTION, SOLUTION INTRAMUSCULAR; INTRAVENOUS at 10:48

## 2019-02-09 RX ADMIN — CEFAZOLIN SODIUM 2 G: 2 INJECTION, SOLUTION INTRAVENOUS at 22:13

## 2019-02-09 RX ADMIN — CYCLOBENZAPRINE 10 MG: 10 TABLET, FILM COATED ORAL at 09:40

## 2019-02-09 RX ADMIN — INSULIN LISPRO 4 UNITS: 100 INJECTION, SOLUTION INTRAVENOUS; SUBCUTANEOUS at 16:46

## 2019-02-09 RX ADMIN — INSULIN LISPRO 4 UNITS: 100 INJECTION, SOLUTION INTRAVENOUS; SUBCUTANEOUS at 21:15

## 2019-02-09 RX ADMIN — CEFAZOLIN SODIUM 2 G: 2 INJECTION, SOLUTION INTRAVENOUS at 15:26

## 2019-02-09 RX ADMIN — ACETAMINOPHEN 650 MG: 325 TABLET, FILM COATED ORAL at 21:04

## 2019-02-09 RX ADMIN — ACETAMINOPHEN 650 MG: 325 TABLET, FILM COATED ORAL at 03:08

## 2019-02-09 RX ADMIN — OXYCODONE 10 MG: 5 TABLET ORAL at 12:39

## 2019-02-09 RX ADMIN — ATORVASTATIN CALCIUM 40 MG: 20 TABLET, FILM COATED ORAL at 21:15

## 2019-02-09 RX ADMIN — MUPIROCIN 10 APPLICATION: 20 OINTMENT TOPICAL at 09:40

## 2019-02-09 RX ADMIN — SENNOSIDES AND DOCUSATE SODIUM 2 TABLET: 8.6; 5 TABLET ORAL at 21:15

## 2019-02-09 RX ADMIN — CYCLOBENZAPRINE 10 MG: 10 TABLET, FILM COATED ORAL at 18:12

## 2019-02-09 RX ADMIN — CHLORHEXIDINE GLUCONATE 15 ML: 1.2 RINSE ORAL at 09:40

## 2019-02-09 RX ADMIN — ALBUMIN (HUMAN) 500 ML: 12.5 SOLUTION INTRAVENOUS at 05:09

## 2019-02-09 NOTE — SIGNIFICANT NOTE
02/09/19 1100   Rehab Time/Intention   Evaluation Not Performed other (see comments)  (Per RN Maddie patient was up walking with Nursing this AM. He is now asleep and resting in bed. RN Requests we check back tomorrow)   Rehab Treatment   Discipline physical therapist   Recommendation   PT - Next Appointment 02/10/19

## 2019-02-09 NOTE — PLAN OF CARE
Problem: Patient Care Overview  Goal: Plan of Care Review   02/09/19 0010   Coping/Psychosocial   Plan of Care Reviewed With patient   Plan of Care Review   Progress improving   OTHER   Outcome Summary VSS. 2 L. SR. Denies SOA. C/o pain treated per MAR. Up with assist x1. Routine post-op care, as charted. CTM.

## 2019-02-09 NOTE — CONSULTS
Date of Hospital Visit: [unfilled]ENC@  Encounter Provider: Argentina Prieto MD  [facility] of Service[facility]  Patient Name: Spencer Sinha  :1950  Referral Provider: Nathaniel Crawford MD    Chief complaint    cad    History of Present Illness      This is a 68-year-old gentleman who has a known history of atrial fibrillation due to thyrotoxicosis in the past.  When his thyroid was treated, and his atrial fibrillation resolved.  In , he had a preoperative stress nuclear perfusion study which was abnormal showing anterior infarct jeffry-infarct ischemia.  His cardiac catheterization showed severe LAD disease with good collateralization.  He was treated medically.  He has peripheral arterial disease and had aortobifemoral bypass with Dr. Solo in .  He has discoid lupus and follows with Dr. Foster.  He smoked but stopped in .    He has CT scan which showed extensive coronary artery calcification and he was having some mild exertional dyspnea.  He had a nuclear perfusion study which showed a medium-sized area of moderate to severe ischemia in the inferoapical distribution.  Cardiac catheterization showed normal left main, oxalate occluded LAD with left to left collaterals, 50% proximal and mid circumflex, 90% ostial RCA, 90% proximal and distal RCA.  On 19, he received LIMA to LAD, SVG to OM and SVG to PDA.    He is Sore but overall feeling well.  He has no cough, fevers, nausea, dizziness.  He has really no complaints.  He has come off the ventilator and is off all IV drips.    Past Medical History:   Diagnosis Date   • Arteriosclerotic coronary artery disease    • Atrial fibrillation (CMS/HCC)    • Chest pressure    • Coronary artery disease    • Depression    • Diabetes (CMS/HCC)     type II, mellitus   • Diplopia    • Discoid lupus erythematosus    • Graves disease    • History of echocardiogram     L ATRIUM ENLARGED, anterior myocardial  infarction, lft axis deviation, abnormal ECG   • History of electrocardiogram     SHOWS NORMAL SINUS RHYTHM, ANTERIOR MYOCARDIAL INFARCTION, OLD LDEFT AXIS DEVIATION, LEFT ANTERIOR FASCICULAR BLOCK, ABNORMAL ECG   • Hyperinsulinism    • Hyperlipidemia    • Hypertension    • Hypertensive heart disease    • Hypogonadism, male    • Hypothyroidism, postablative    • Left ventricular hypertrophy    • Long-term insulin use (CMS/HCC)    • Lung mass     LOWER RIGHT LUNG JUST MONITORING   • Metabolic disorder    • Myocardial infarction of inferolateral wall greater than eight weeks ago     of inferior wall, greater than 8 weeks   • Osteoarthritis    • Peripheral vascular disease (CMS/HCC)    • Right bundle branch block with left anterior fascicular block    • Syncope    • Thyrotoxicosis factitia    • Type 2 diabetes mellitus (CMS/[facility])        Past Surgical History:   Procedure Laterality Date   • ABDOMINAL AORTA STENT Bilateral     USING VEIN: AORTOFEMORAL   • BACK SURGERY     • CARDIAC CATHETERIZATION N/A 1/31/2019    Procedure: Coronary angiography;  Surgeon: Hardik Silverman MD;  Location:  [facility];  Service: Cardiovascular   • CARDIAC CATHETERIZATION N/A 1/31/2019    Procedure: Left Heart Cath;  Surgeon: Hardik Silverman MD;  Location:  [facility];  Service: Cardiovascular   • CARDIAC CATHETERIZATION N/A 1/31/2019    Procedure: Left ventriculography;  Surgeon: Hardik Silverman MD;  Location:  [facility];  Service: Cardiovascular   • CATARACT EXTRACTION  2018   • COLONOSCOPY     • JOINT REPLACEMENT Bilateral     knee   • LUNG BIOPSY     • OTHER SURGICAL HISTORY      CATHETER ABLATION   • REPLACEMENT TOTAL KNEE BILATERAL     • TONSILLECTOMY AND ADENOIDECTOMY         Medications Prior to Admission   Medication Sig Dispense Refill Last Dose   • ALPRAZolam (XANAX) 0.5 MG tablet Take 0.25 mg by mouth 2 (Two) Times a Day.   2/7/2019 at 2100   • aspirin 81 MG EC tablet Take  "81 mg by mouth Daily. HOLDING PER MD   1/31/2019 at 0800   • atorvastatin (LIPITOR) 40 MG tablet Take 1 tablet by mouth Daily. 90 tablet 2 2/7/2019 at 2100   • carvedilol (COREG) 6.25 MG tablet Take 6.25 mg by mouth 2 (Two) Times a Day With Meals.   2/7/2019 at 2100   • chlorhexidine (PERIDEX) 0.12 % solution Apply 15 mL to the mouth or throat. AS DIRECTED PREOP   2/8/2019 at 0430   • CHLORHEXIDINE GLUCONATE CLOTH EX Apply  topically. AS DIRECTED PREOP   2/8/2019 at 0430   • Cholecalciferol (VITAMIN D3) 2000 units tablet Take 1 tablet by mouth Daily.   2/7/2019 at 0800   • DULoxetine (CYMBALTA) 30 MG capsule Take 30 mg by mouth Daily.   2/7/2019 at 2100   • DULoxetine (CYMBALTA) 60 MG capsule Take 60 mg by mouth Every Evening.   2/7/2019 at 0800   • glimepiride (AMARYL) 1 MG tablet Take 1 mg by mouth 2 (Two) Times a Day.   2/7/2019 at 2100   • glucose blood (KIMBERLY CONTOUR TEST) test strip Kimberly Contour Test In Vitro Strip; Patient Sig: Kimberly Contour Test In Vitro Strip ; 100; 0; 06-May-2012; Active   2/7/2019 at 2100   • Insulin Pen Needle (NOVOFINE) 32G X 6 MM misc    2/7/2019 at 2100   • levothyroxine (SYNTHROID, LEVOTHROID) 175 MCG tablet Take 175 mcg by mouth Daily.   2/7/2019 at 0800   • Liraglutide (VICTOZA) 18 MG/3ML solution pen-injector injection Inject 1.8 mg under the skin into the appropriate area as directed Daily.   2/7/2019 at 0800   • lisinopril (PRINIVIL,ZESTRIL) 5 MG tablet Take 1 tablet by mouth Daily. 90 tablet 3 2/7/2019 at 0800   • metFORMIN (GLUCOPHAGE) 1000 MG tablet Take 1,000 mg by mouth 2 (Two) Times a Day With Meals.   2/7/2019 at 1800   • Multiple Vitamin (MULTI-DAY VITAMINS PO) Take 1 tablet by mouth Daily. HOLD PRIOR TO SURG   2/7/2019 at 0800   • mupirocin (BACTROBAN) 2 % ointment Apply  topically to the appropriate area as directed. AS DIRECTED PREOP   2/8/2019 at 0430   • Syringe/Needle, Disp, (B-D SYRINGE/NEEDLE 3CC/22GX1.5) 22G X 1-1/2\" 3 [facility]    2/7/2019 at 2100   • traZODone " (DESYREL) 50 MG tablet Take 50 mg by mouth Every Night.   2/7/2019 at 2100   • clopidogrel (PLAVIX) 75 MG tablet Take 75 mg by mouth Daily. HOLDING PER MD   1/31/2019 at 0800   • Testosterone Cypionate 200 MG/ML kit Inject  into the appropriate muscle as directed by prescriber Every 10 (Ten) Days.   2/6/2019 at 0800       Current Meds  Scheduled Meds:    aspirin 81 mg Oral Daily   atorvastatin 40 mg Oral Nightly   ceFAZolin 2 g Intravenous Q8H   chlorhexidine 15 mL Mouth/Throat Q12H   enoxaparin 40 mg Subcutaneous Q24H   levothyroxine 175 mcg Oral Daily   magnesium sulfate 1 g Intravenous Q8H   metoclopramide 10 mg Intravenous Q6H   metoprolol tartrate 12.5 mg Oral Q12H   mupirocin  Each Nare BID   sennosides-docusate sodium 2 tablet Oral Nightly     Continuous Infusions:    clevidipine 2-32 mg/hr    dexmedetomidine 0.2-1.5 mcg/kg/hr Last Rate: Stopped (02/08/19 1605)   DOPamine 2-20 mcg/kg/min    EPINEPHrine 0.02-0.3 mcg/kg/min    insulin 0-50 Units/hr Last Rate: Stopped (02/09/19 0530)   milrinone 0.25-0.75 mcg/kg/min    niCARdipine 5-15 mg/hr Last Rate: Stopped (02/08/19 1548)   nitroglycerin 10-50 mcg/min    norepinephrine 0.02-0.3 mcg/kg/min    phenylephrine 0.2-3 mcg/kg/min    propofol 5-50 mcg/kg/min Last Rate: Stopped (02/08/19 1542)   sodium chloride 30 mL/hr Last Rate: 30 mL/hr (02/08/19 1430)   sodium chloride 30 mL/hr Last Rate: 30 mL/hr (02/08/19 1430)   vasopressin 0.02-0.1 Units/min      PRN Meds:.•  acetaminophen  •  acetaminophen  •  albumin human  •  ALPRAZolam  •  bisacodyl  •  bisacodyl  •  clevidipine  •  cyclobenzaprine  •  DOPamine  •  EPINEPHrine  •  furosemide  •  HYDROcodone-acetaminophen  •  insulin  •  magnesium hydroxide  •  midazolam  •  milrinone  •  Morphine **AND** naloxone  •  Morphine  •  niCARdipine  •  norepinephrine  •  ondansetron  •  oxyCODONE  •  phenylephrine  •  potassium chloride **OR** potassium chloride  •  potassium chloride **OR** potassium chloride  •  potassium  "chloride  •  potassium chloride  •  potassium chloride  •  promethazine **OR** promethazine  •  propofol  •  sodium chloride  •  sodium chloride  •  vasopressin    Allergies as of 02/01/2019   • (No Known Allergies)       Social History     Socioeconomic History   • Marital status:      Spouse name: Not on file   • Number of children: Not on file   • Years of education: Not on file   • Highest education level: Not on file   Social Needs   • Financial resource strain: Not on file   • Food insecurity - worry: Not on file   • Food insecurity - inability: Not on file   • Transportation needs - medical: Not on file   • Transportation needs - non-medical: Not on file   Occupational History   • Not on file   Tobacco Use   • Smoking status: Former Smoker   • Smokeless tobacco: Never Used   • Tobacco comment: quit 3.5 yr ago   Substance and Sexual Activity   • Alcohol use: No   • Drug use: No   • Sexual activity: Defer   Other Topics Concern   • Not on file   Social History Narrative   • Not on file       Family History   Problem Relation Age of Onset   • Coronary artery disease Other    • Malig Hyperthermia Neg Hx        REVIEW OF SYSTEMS:   12 point ROS was performed and is negative except as outlined in HPI            Objective:   Temp:  [98.3 °F (36.8 °C)] 98.3 °F (36.8 °C)  Heart Rate:  [64-90] 77  Resp:  [12-15] 14  BP: ()/(60-86) 121/68  Arterial Line BP: ()/() 107/55  FiO2 (%):  [40 %] 40 %  Body mass index is 27.55 kg/m².  Flowsheet Rows      First Filed Value   Admission Height  180.3 cm (71\") Documented at 02/08/2019 0647   Admission Weight  89.6 kg (197 lb 8.5 oz) Documented at 02/08/2019 0647        Vitals:    02/09/19 0859   BP:    Pulse: 77   Resp:    Temp:    SpO2: 95%       General Appearance:    Alert, cooperative, in no acute distress   Head:    Normocephalic, without obvious abnormality, atraumatic   Eyes:            Lids and lashes normal, conjunctivae and sclerae normal, no   " icterus, no pallor, corneas clear   Ears:    Ears appear intact with no abnormalities noted   Throat:   No oral lesions, no thrush, oral mucosa moist   Neck:   No adenopathy, supple, trachea midline, no thyromegaly, no   carotid bruit, no JVD   Back:     No kyphosis present, no scoliosis present, no skin lesions, erythema or scars, no tenderness to percussion or palpation, range of motion normal   Lungs:     Clear to auscultation,respirations regular, even and unlabored    Heart:    Regular rhythm and normal rate, normal S1 and S2, no murmur, no gallop, no rub, no click   Chest Wall:    No abnormalities observed   Abdomen:     Normal bowel sounds, no masses, no organomegaly, soft        non-tender, non-distended, no guarding, no rebound  tenderness   Extremities:   Moves all extremities well, no edema, no cyanosis, no redness   Pulses:   Pulses palpable and equal bilaterally. Normal radial, carotid, femoral, dorsalis pedis and posterior tibial pulses bilaterally. Normal abdominal aorta   Skin:  Psychiatric:   No bleeding, bruising or rash    Alert and oriented x 3, normal mood and affect                 Lab Review:      Results from last 7 days   Lab Units 02/09/19 0313 02/07/19  0849   SODIUM mmol/L 146*   < > 139   POTASSIUM mmol/L 4.2   < > 4.8   CHLORIDE mmol/L 110*   < > 100   CO2 mmol/L 22.5   < > 28.2   BUN mg/dL 13   < > 15   CREATININE mg/dL 0.96   < > 0.97   CALCIUM mg/dL 8.2*   < > 9.6   BILIRUBIN mg/dL  --   --  0.4   ALK PHOS U/L  --   --  61   ALT (SGPT) U/L  --   --  33   AST (SGOT) U/L  --   --  19   GLUCOSE mg/dL 121*   < > 209*    < > = values in this interval not displayed.           Results from last 7 days   Lab Units 02/09/19 0313 02/08/19  1910 02/08/19  1434   WBC 10*3/mm3 9.47 12.13* 7.48   HEMOGLOBIN g/dL 13.1* 13.2* 12.6*   HEMATOCRIT % 42.1 40.8 39.1*   PLATELETS 10*3/mm3 146 143 135*     Results from last 7 days   Lab Units 02/09/19 0313 02/08/19  1434 02/07/19  0849   INR  1.15*  1.33* 0.95   APTT seconds  --  30.9 25.6     Results from last 7 days   Lab Units 02/09/19  0313   MAGNESIUM mg/dL 2.5*         I personally viewed and interpreted the patient's EKG/Telemetry data  )  Patient Active Problem List   Diagnosis   • Chronic coronary artery disease   • Diplopia   • Graves disease   • 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)   • Hyperinsulinism   • Uncontrolled type 2 diabetes mellitus with complication, without long-term current use of insulin (CMS/[facility])   • Hyperlipidemia associated with type 2 diabetes mellitus (CMS/[facility])   • Angina pectoris (CMS/[facility])   • Abnormal nuclear stress test   • Coronary artery disease of native heart with stable angina pectoris (CMS/[facility])     Assessment and Plan:    1. Multivessel CAD, s/p CABG x 3 on 2/8/19. Recovering.   2. S/p aortobifemoral bypass in 2008  3. Hyperlipidemia - on atorvastatin.   4. Quit smoking in 2015.   5. Hypothyroidism - on levothyroxine.   6. DM    No changes, will follow.     Argentina Prieto MD  02/09/19  9:58 AM.  Time spent in reviewing chart, discussion and examination:

## 2019-02-10 ENCOUNTER — APPOINTMENT (OUTPATIENT)
Dept: GENERAL RADIOLOGY | Facility: HOSPITAL | Age: 69
End: 2019-02-10

## 2019-02-10 LAB
ANION GAP SERPL CALCULATED.3IONS-SCNC: 12.8 MMOL/L
BUN BLD-MCNC: 14 MG/DL (ref 8–23)
BUN/CREAT SERPL: 13.6 (ref 7–25)
CALCIUM SPEC-SCNC: 9.2 MG/DL (ref 8.6–10.5)
CHLORIDE SERPL-SCNC: 104 MMOL/L (ref 98–107)
CO2 SERPL-SCNC: 25.2 MMOL/L (ref 22–29)
CREAT BLD-MCNC: 1.03 MG/DL (ref 0.76–1.27)
DEPRECATED RDW RBC AUTO: 45.9 FL (ref 37–54)
ERYTHROCYTE [DISTWIDTH] IN BLOOD BY AUTOMATED COUNT: 13.9 % (ref 11.5–14.5)
GFR SERPL CREATININE-BSD FRML MDRD: 72 ML/MIN/1.73
GLUCOSE BLD-MCNC: 163 MG/DL (ref 65–99)
GLUCOSE BLDC GLUCOMTR-MCNC: 166 MG/DL (ref 70–130)
GLUCOSE BLDC GLUCOMTR-MCNC: 215 MG/DL (ref 70–130)
GLUCOSE BLDC GLUCOMTR-MCNC: 236 MG/DL (ref 70–130)
GLUCOSE BLDC GLUCOMTR-MCNC: 259 MG/DL (ref 70–130)
HCT VFR BLD AUTO: 43.3 % (ref 40.4–52.2)
HGB BLD-MCNC: 13.6 G/DL (ref 13.7–17.6)
MCH RBC QN AUTO: 28.2 PG (ref 27–32.7)
MCHC RBC AUTO-ENTMCNC: 31.4 G/DL (ref 32.6–36.4)
MCV RBC AUTO: 89.8 FL (ref 79.8–96.2)
PLATELET # BLD AUTO: 159 10*3/MM3 (ref 140–500)
PMV BLD AUTO: 9.9 FL (ref 6–12)
POTASSIUM BLD-SCNC: 3.6 MMOL/L (ref 3.5–5.2)
RBC # BLD AUTO: 4.82 10*6/MM3 (ref 4.6–6)
SODIUM BLD-SCNC: 142 MMOL/L (ref 136–145)
WBC NRBC COR # BLD: 12.25 10*3/MM3 (ref 4.5–10.7)

## 2019-02-10 PROCEDURE — 93010 ELECTROCARDIOGRAM REPORT: CPT | Performed by: INTERNAL MEDICINE

## 2019-02-10 PROCEDURE — 63710000001 INSULIN LISPRO (HUMAN) PER 5 UNITS: Performed by: THORACIC SURGERY (CARDIOTHORACIC VASCULAR SURGERY)

## 2019-02-10 PROCEDURE — 99232 SBSQ HOSP IP/OBS MODERATE 35: CPT | Performed by: INTERNAL MEDICINE

## 2019-02-10 PROCEDURE — 25010000003 CEFAZOLIN IN DEXTROSE 2-4 GM/100ML-% SOLUTION: Performed by: THORACIC SURGERY (CARDIOTHORACIC VASCULAR SURGERY)

## 2019-02-10 PROCEDURE — 71045 X-RAY EXAM CHEST 1 VIEW: CPT

## 2019-02-10 PROCEDURE — 97162 PT EVAL MOD COMPLEX 30 MIN: CPT

## 2019-02-10 PROCEDURE — 93005 ELECTROCARDIOGRAM TRACING: CPT | Performed by: THORACIC SURGERY (CARDIOTHORACIC VASCULAR SURGERY)

## 2019-02-10 PROCEDURE — 25010000002 ENOXAPARIN PER 10 MG: Performed by: THORACIC SURGERY (CARDIOTHORACIC VASCULAR SURGERY)

## 2019-02-10 PROCEDURE — 85027 COMPLETE CBC AUTOMATED: CPT | Performed by: THORACIC SURGERY (CARDIOTHORACIC VASCULAR SURGERY)

## 2019-02-10 PROCEDURE — 25010000002 FUROSEMIDE PER 20 MG: Performed by: INTERNAL MEDICINE

## 2019-02-10 PROCEDURE — 94799 UNLISTED PULMONARY SVC/PX: CPT

## 2019-02-10 PROCEDURE — 80048 BASIC METABOLIC PNL TOTAL CA: CPT | Performed by: THORACIC SURGERY (CARDIOTHORACIC VASCULAR SURGERY)

## 2019-02-10 PROCEDURE — 82962 GLUCOSE BLOOD TEST: CPT

## 2019-02-10 RX ORDER — FUROSEMIDE 10 MG/ML
40 INJECTION INTRAMUSCULAR; INTRAVENOUS ONCE
Status: COMPLETED | OUTPATIENT
Start: 2019-02-10 | End: 2019-02-10

## 2019-02-10 RX ADMIN — METOPROLOL TARTRATE 25 MG: 25 TABLET ORAL at 20:50

## 2019-02-10 RX ADMIN — OXYCODONE 10 MG: 5 TABLET ORAL at 09:53

## 2019-02-10 RX ADMIN — CEFAZOLIN SODIUM 2 G: 2 INJECTION, SOLUTION INTRAVENOUS at 05:50

## 2019-02-10 RX ADMIN — INSULIN LISPRO 4 UNITS: 100 INJECTION, SOLUTION INTRAVENOUS; SUBCUTANEOUS at 18:40

## 2019-02-10 RX ADMIN — CYCLOBENZAPRINE 10 MG: 10 TABLET, FILM COATED ORAL at 03:23

## 2019-02-10 RX ADMIN — ACETAMINOPHEN 650 MG: 325 TABLET, FILM COATED ORAL at 14:21

## 2019-02-10 RX ADMIN — CYCLOBENZAPRINE 10 MG: 10 TABLET, FILM COATED ORAL at 11:29

## 2019-02-10 RX ADMIN — SENNOSIDES AND DOCUSATE SODIUM 2 TABLET: 8.6; 5 TABLET ORAL at 20:50

## 2019-02-10 RX ADMIN — POTASSIUM CHLORIDE 40 MEQ: 750 CAPSULE, EXTENDED RELEASE ORAL at 08:35

## 2019-02-10 RX ADMIN — ACETAMINOPHEN 650 MG: 325 TABLET, FILM COATED ORAL at 09:53

## 2019-02-10 RX ADMIN — ENOXAPARIN SODIUM 40 MG: 40 INJECTION SUBCUTANEOUS at 18:40

## 2019-02-10 RX ADMIN — METOPROLOL TARTRATE 12.5 MG: 25 TABLET ORAL at 08:35

## 2019-02-10 RX ADMIN — ASPIRIN 81 MG: 81 TABLET, DELAYED RELEASE ORAL at 08:37

## 2019-02-10 RX ADMIN — ATORVASTATIN CALCIUM 40 MG: 20 TABLET, FILM COATED ORAL at 20:50

## 2019-02-10 RX ADMIN — LEVOTHYROXINE SODIUM 175 MCG: 175 TABLET ORAL at 06:30

## 2019-02-10 RX ADMIN — ACETAMINOPHEN 650 MG: 325 TABLET, FILM COATED ORAL at 05:49

## 2019-02-10 RX ADMIN — CHLORHEXIDINE GLUCONATE 15 ML: 1.2 RINSE ORAL at 08:35

## 2019-02-10 RX ADMIN — ACETAMINOPHEN 650 MG: 325 TABLET, FILM COATED ORAL at 18:40

## 2019-02-10 RX ADMIN — ACETAMINOPHEN 650 MG: 325 TABLET, FILM COATED ORAL at 01:36

## 2019-02-10 RX ADMIN — INSULIN LISPRO 4 UNITS: 100 INJECTION, SOLUTION INTRAVENOUS; SUBCUTANEOUS at 11:29

## 2019-02-10 RX ADMIN — MUPIROCIN 10 APPLICATION: 20 OINTMENT TOPICAL at 20:50

## 2019-02-10 RX ADMIN — OXYCODONE 10 MG: 5 TABLET ORAL at 14:20

## 2019-02-10 RX ADMIN — FUROSEMIDE 40 MG: 10 INJECTION, SOLUTION INTRAMUSCULAR; INTRAVENOUS at 09:53

## 2019-02-10 RX ADMIN — MUPIROCIN 10 APPLICATION: 20 OINTMENT TOPICAL at 08:35

## 2019-02-10 RX ADMIN — INSULIN LISPRO 2 UNITS: 100 INJECTION, SOLUTION INTRAVENOUS; SUBCUTANEOUS at 07:04

## 2019-02-10 RX ADMIN — INSULIN LISPRO 6 UNITS: 100 INJECTION, SOLUTION INTRAVENOUS; SUBCUTANEOUS at 20:50

## 2019-02-10 RX ADMIN — OXYCODONE 10 MG: 5 TABLET ORAL at 05:49

## 2019-02-10 RX ADMIN — OXYCODONE 10 MG: 5 TABLET ORAL at 18:40

## 2019-02-10 RX ADMIN — OXYCODONE 10 MG: 5 TABLET ORAL at 01:36

## 2019-02-10 RX ADMIN — CHLORHEXIDINE GLUCONATE 15 ML: 1.2 RINSE ORAL at 20:50

## 2019-02-10 NOTE — PROGRESS NOTES
LOS: 2 days   Patient Care Team:  Phil Gilliam MD as PCP - General (Internal Medicine)  Beverly Paredes MD as PCP - Claims Attributed  Argentina Prieto MD as Consulting Physician (Cardiology)    Chief Complaint:     F/u CAD, s/p CABG    Interval History:     No cp, has dyspnea.  No dizziness or nausea.     Objective   Vital Signs  Temp:  [97.9 °F (36.6 °C)-98.4 °F (36.9 °C)] 97.9 °F (36.6 °C)  Heart Rate:  [69-90] 70  Resp:  [15-18] 15  BP: (106-128)/(66-80) 117/80    Intake/Output Summary (Last 24 hours) at 2/10/2019 0725  Last data filed at 2/10/2019 0638  Gross per 24 hour   Intake 637 ml   Output 1615 ml   Net -978 ml       Comfortable NAD  Neck supple, no JVD or thyromegaly appreciated  S1/S2 RRR, no m/r/g  Lungs CTA B but decreased at bases, normal effort  Abdomen S/NT/Distended (+) BS, no HSM appreciated  Extremities warm, no clubbing, cyanosis, or edema  No visible or palpable skin lesions  A/Ox4, mood and affect appropriate    Results Review:      Results from last 7 days   Lab Units 02/10/19  0350 02/09/19 0313 02/08/19 2300 02/08/19 1910   SODIUM mmol/L 142 146*  --  145   POTASSIUM mmol/L 3.6 4.2 4.0 3.5   CHLORIDE mmol/L 104 110*  --  109*   CO2 mmol/L 25.2 22.5  --  22.9   BUN mg/dL 14 13  --  16   CREATININE mg/dL 1.03 0.96  --  0.92   GLUCOSE mg/dL 163* 121*  --  135*   CALCIUM mg/dL 9.2 8.2*  --  8.5*         Results from last 7 days   Lab Units 02/10/19  0349 02/09/19 0313 02/08/19 1910   WBC 10*3/mm3 12.25* 9.47 12.13*   HEMOGLOBIN g/dL 13.6* 13.1* 13.2*   HEMATOCRIT % 43.3 42.1 40.8   PLATELETS 10*3/mm3 159 146 143     Results from last 7 days   Lab Units 02/09/19  0313 02/08/19  1434 02/07/19  0849   INR  1.15* 1.33* 0.95   APTT seconds  --  30.9 25.6     Results from last 7 days   Lab Units 02/07/19  0849   CHOLESTEROL mg/dL 132     Results from last 7 days   Lab Units 02/09/19  0313   MAGNESIUM mg/dL 2.5*     Results from last 7 days   Lab Units 02/07/19  0849    CHOLESTEROL mg/dL 132   TRIGLYCERIDES mg/dL 193*   HDL CHOL mg/dL 37*   LDL CHOL mg/dL 56       I reviewed the patient's new clinical results.  I personally viewed and interpreted the patient's EKG/Telemetry data        Medication Review:     aspirin 81 mg Oral Daily   atorvastatin 40 mg Oral Nightly   chlorhexidine 15 mL Mouth/Throat Q12H   enoxaparin 40 mg Subcutaneous Q24H   insulin lispro 0-9 Units Subcutaneous 4x Daily With Meals & Nightly   levothyroxine 175 mcg Oral Daily   metoprolol tartrate 12.5 mg Oral Q12H   mupirocin  Each Nare BID   sennosides-docusate sodium 2 tablet Oral Nightly            Assessment/Plan       Coronary artery disease of native heart with stable angina pectoris (CMS/HCC)    1. Multivessel CAD, s/p CABG x 3 on 2/8/19. Recovering.   2. S/p aortobifemoral bypass in 2008  3. Hyperlipidemia - on atorvastatin.   4. Quit smoking in 2015.   5. Hypothyroidism - on levothyroxine.   6. DM    Diurese today.  Overall doing well.       Argentina Prieto MD  02/10/19  7:25 AM

## 2019-02-10 NOTE — THERAPY EVALUATION
Acute Care - Physical Therapy Initial Evaluation  [facility]     Patient Name: Spencer Sinha  : 1950  MRN: [id number]  Today's Date: 2/10/2019   Onset of Illness/Injury or Date of Surgery: 19  Date of Referral to PT: 19  Referring Physician: Ty      Admit Date: 2019    Visit Dx:     ICD-10-CM ICD-9-CM   1. Impaired mobility Z74.09 799.89   2. 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   • Hyperlipidemia   • Hypertension   • Hypogonadism in male   • Postablative hypothyroidism   • Peripheral vascular disease (CMS/HCC)   • Hyperinsulinism   • Uncontrolled type 2 diabetes mellitus with complication, without long-term current use of insulin (CMS/[facility])   • Hyperlipidemia associated with type 2 diabetes mellitus (CMS/[facility])   • Angina pectoris (CMS/[facility])   • Abnormal nuclear stress test   • Coronary artery disease of native heart with stable angina pectoris (CMS/HCC)     Past Medical History:   Diagnosis Date   • Arteriosclerotic coronary artery disease    • Atrial fibrillation (CMS/[facility])    • Chest pressure    • Coronary artery disease    • Depression    • Diabetes (CMS/[facility])     type II, mellitus   • Diplopia    • Discoid lupus erythematosus    • Graves disease    • History of echocardiogram     L ATRIUM ENLARGED, anterior myocardial infarction, lft axis deviation, abnormal ECG   • History of electrocardiogram     SHOWS NORMAL SINUS RHYTHM, ANTERIOR MYOCARDIAL INFARCTION, OLD LDEFT AXIS DEVIATION, LEFT ANTERIOR FASCICULAR BLOCK, ABNORMAL ECG   • Hyperinsulinism    • Hyperlipidemia    • Hypertension    • Hypertensive heart disease    • Hypogonadism, male    • Hypothyroidism, postablative    • Left ventricular hypertrophy    • Long-term  insulin use (CMS/[facility])    • Lung mass     LOWER RIGHT LUNG JUST MONITORING   • Metabolic disorder    • Myocardial infarction of inferolateral wall greater than eight weeks ago     of inferior wall, greater than 8 weeks   • Osteoarthritis    • Peripheral vascular disease (CMS/HCC)    • Right bundle branch block with left anterior fascicular block    • Syncope    • Thyrotoxicosis factitia    • Type 2 diabetes mellitus (CMS/[facility])      Past Surgical History:   Procedure Laterality Date   • ABDOMINAL AORTA STENT Bilateral     USING VEIN: AORTOFEMORAL   • BACK SURGERY     • CARDIAC CATHETERIZATION N/A 1/31/2019    Procedure: Coronary angiography;  Surgeon: Hardik Silverman MD;  Location:  [facility];  Service: Cardiovascular   • CARDIAC CATHETERIZATION N/A 1/31/2019    Procedure: Left Heart Cath;  Surgeon: Hardik Silverman MD;  Location:  [facility];  Service: Cardiovascular   • CARDIAC CATHETERIZATION N/A 1/31/2019    Procedure: Left ventriculography;  Surgeon: Hardik Silverman MD;  Location:  [facility];  Service: Cardiovascular   • CATARACT EXTRACTION  2018   • COLONOSCOPY     • JOINT REPLACEMENT Bilateral     knee   • LUNG BIOPSY     • OTHER SURGICAL HISTORY      CATHETER ABLATION   • REPLACEMENT TOTAL KNEE BILATERAL     • TONSILLECTOMY AND ADENOIDECTOMY          PT ASSESSMENT (last 12 hours)      Physical Therapy Evaluation     Row Name 02/10/19 0854          PT Evaluation Time/Intention    Subjective Information  complains of;pain  -MR     Document Type  evaluation  -MR     Mode of Treatment  physical therapy  -MR     Patient Effort  good  -MR     Symptoms Noted During/After Treatment  increased pain  -MR     Row Name 02/10/19 0854          General Information    Patient Profile Reviewed?  yes  -MR     Onset of Illness/Injury or Date of Surgery  02/08/19  -MR     Referring Physician  Ty  -MR     Patient Observations  alert;cooperative;agree to therapy  -MR      Patient/Family Observations  Pt resting in bed.  Pt reporting he has been sitting up in the chair for meals but finds the chair ot be uncomfortable.  -MR     Prior Level of Function  independent:;all household mobility;community mobility  -MR     Equipment Currently Used at Home  none  -MR     Pertinent History of Current Functional Problem  PT is s/p CABG x 3.  Independent prior to surgery.  -MR     Existing Precautions/Restrictions  cardiac;fall  -MR     Barriers to Rehab  none identified  -MR     Row Name 02/10/19 0854          Relationship/Environment    Primary Source of Support/Comfort  spouse  -     Row Name 02/10/19 0854          Resource/Environmental Concerns    Current Living Arrangements  home/apartment/condo  -     Row Name 02/10/19 0854          Stairs Within Home, Primary    Stairs Comment, Within Home, Primary  Two-story home.  -MR [place] Name 02/10/19 0854          Cognitive Assessment/Interventions    Additional Documentation  --  -MR [place] Name 02/10/19 0854          Cognitive Assessment/Intervention- PT/OT    Orientation Status (Cognition)  oriented x 4  -MR     Follows Commands (Cognition)  WNL  -MR [place] Name 02/10/19 0854          Bed Mobility Assessment/Treatment    Bed Mobility Assessment/Treatment  supine-sit;sit-supine  -MR     Supine-Sit [place] (Bed Mobility)  contact guard;verbal cues  -MR     Sit-Supine [place] (Bed Mobility)  contact guard;verbal cues  -MR     Assistive Device (Bed Mobility)  bed rails  -MR     Comment (Bed Mobility)  Cues to avoid excessive UE use.  -MR [place] Name 02/10/19 0854          Transfer Assessment/Treatment    Transfer Assessment/Treatment  sit-stand transfer;stand-sit transfer  -MR     Sit-Stand [place] (Transfers)  contact guard;verbal cues  -MR     Stand-Sit [place] (Transfers)  contact guard;verbal cues  -     Row Name 02/10/19 0854          Stand-Sit Transfer    Assistive Device (Stand-Sit Transfers)  -- HHA  -MR [place]  Name 02/10/19 0854          Gait/Stairs Assessment/Training    Gait/Stairs Assessment/Training  gait/ambulation independence;distance ambulated  -MR     [place] Level (Gait)  contact guard;minimum assist (75% patient effort);verbal cues  -MR     Assistive Device (Gait)  -- HHA  -MR     Distance in Feet (Gait)  100  -MR     Pattern (Gait)  step-through  -MR     Deviations/Abnormal Patterns (Gait)  josefina decreased;stride length decreased  -MR     Comment (Gait/Stairs)  Pt with guarded gait from increase in chest pain with ambulation.  VSS with mobility, but pt having increased pain.  RN immediately notified.  -MR     Row Name             Wound 02/08/19 1311 chest incision    Wound - Properties Group Date first assessed: 02/08/19  -SR Time first assessed: 1311  -SR Location: chest  -SR Type: incision  -SR    Row Name             Wound 02/08/19 1311 Left leg incision    Wound - Properties Group Date first assessed: 02/08/19  -SR Time first assessed: 1311  -SR Side: Left  -SR Location: leg  -SR Type: incision  -SR    Row Name 02/10/19 0854          Plan of Care Review    Plan of Care Reviewed With  patient  -     Row Name 02/10/19 0854          Physical Therapy Clinical Impression    Date of Referral to PT  02/08/19  -MR     PT Diagnosis (PT Clinical Impression)  Pt presents with impaired mobility and endurance following CABG.  -MR     Prognosis (PT Clinical Impression)  Pt motivated and independent prior to admission.  Good prognosis for PT.  -MR     Patient/Family Goals Statement (PT Clinical Impression)  Pt goal to return home.  -MR     Criteria for Skilled Interventions Met (PT Clinical Impression)  yes  -MR     Pathology/Pathophysiology Noted (Describe Specifically for Each System)  cardiovascular;musculoskeletal  -MR     Impairments Found (describe specific impairments)  aerobic capacity/endurance;gait, locomotion, and balance;muscle performance  -MR     Rehab Potential (PT Clinical Summary)  good, to  achieve stated therapy goals  -MR     Predicted Duration of Therapy (PT)  1 week  -MR     Care Plan Review (PT)  evaluation/treatment results reviewed  -MR     Row Name 02/10/19 0854          Vital Signs    Activity Duration  9  -MR     Row Name 02/10/19 0854          Physical Therapy Goals    Transfer Goal Selection (PT)  transfer, PT goal 1  -MR     Gait Training Goal Selection (PT)  gait training, PT goal 1  -MR     Stairs Goal Selection (PT)  stairs, PT goal 1  -MR     Additional Documentation  Stairs Goal Selection (PT) (Row)  -MR     Row Name 02/10/19 0854          Transfer Goal 1 (PT)    Activity/Assistive Device (Transfer Goal 1, PT)  transfers, all  -MR     [place] Level/Cues Needed (Transfer Goal 1, PT)  supervision required  -MR     Time Frame (Transfer Goal 1, PT)  1 week  -MR     Row Name 02/10/19 0854          Gait Training Goal 1 (PT)    Activity/Assistive Device (Gait Training Goal 1, PT)  gait (walking locomotion)  -MR     [place] Level (Gait Training Goal 1, PT)  supervision required  -MR     Distance (Gait Goal 1, PT)  150  -MR     Time Frame (Gait Training Goal 1, PT)  1 week  -MR     Row Name 02/10/19 0854          Stairs Goal 1 (PT)    Activity/Assistive Device (Stairs Goal 1, PT)  stairs, all skills  -MR     [place] Level/Cues Needed (Stairs Goal 1, PT)  contact guard assist  -MR     Number of Stairs (Stairs Goal 1, PT)  10  -MR     Time Frame (Stairs Goal 1, PT)  1 week  -MR     Row Name 02/10/19 0854          Positioning and Restraints    Pre-Treatment Position  in bed  -MR     Post Treatment Position  bed  -MR     In Bed  supine;notified nsg;call light within reach  -MR     Row Name 02/10/19 0854          Living Environment    Home Accessibility  stairs within home  -MR       User Key  (r) = Recorded By, (t) = Taken By, (c) = Cosigned By    Initials Name Provider Type    Leighann Doan RN Registered Nurse    Georgette Vargas, PT Physical Therapist        Physical  Therapy Education     Title: PT OT SLP Therapies (Done)     Topic: Physical Therapy (Done)     Point: Mobility training (Done)     Learning Progress Summary           Patient Acceptance, E, VU by MR at 2/10/2019  9:20 AM                               User Key     Initials Effective Dates Name Provider Type Discipline     01/03/19 -  Georgette Sigala, PT Physical Therapist PT              PT Recommendation and Plan  Anticipated Discharge Disposition (PT): home with home health  Planned Therapy Interventions (PT Eval): balance training, bed mobility training, gait training, home exercise program, neuromuscular re-education, stair training  Therapy Frequency (PT Clinical Impression): daily  Outcome Summary/Treatment Plan (PT)  Anticipated Discharge Disposition (PT): home with home health  Plan of Care Reviewed With: patient  Outcome Summary: Pt able to ambulate in hallway this date.  Demonstrating increase in pain with ambulation although VSS.  RN notified of increase in pain. Predict pt will progress well with mobility and would benefit from  services at discharge.  Outcome Measures     Row Name 02/10/19 0921             How much help from another person do you currently need...    Turning from your back to your side while in flat bed without using bedrails?  3  -MR      Moving from lying on back to sitting on the side of a flat bed without bedrails?  3  -MR      Moving to and from a bed to a chair (including a wheelchair)?  3  -MR      Standing up from a chair using your arms (e.g., wheelchair, bedside chair)?  3  -MR      Climbing 3-5 steps with a railing?  3  -MR      To walk in hospital room?  3  -MR      AM-PAC 6 Clicks Score  18  -MR         Functional Assessment    Outcome Measure Options  AM-PAC 6 Clicks Basic Mobility (PT)  -MR        User Key  (r) = Recorded By, (t) = Taken By, (c) = Cosigned By    Initials Name Provider Type     ZakiyaGeorgette, PT Physical Therapist         Time Calculation:   PT  Charges     Row Name 02/10/19 0922             Time Calculation    Start Time  0854  -MR      Stop Time  0906  -MR      Time Calculation (min)  12 min  -MR      PT Received On  02/10/19  -MR      PT - Next Appointment  02/11/19  -MR      PT Goal Re-Cert Due Date  02/24/19  -MR        User Key  (r) = Recorded By, (t) = Taken By, (c) = Cosigned By    Initials Name Provider Type    MR Georgette Sigala, PT Physical Therapist        Therapy Suggested Charges     Code   Minutes Charges    None           Therapy Charges for Today     Code Description Service Date Service Provider Modifiers Qty    92814168595 HC PT EVAL MOD COMPLEXITY 1 2/10/2019 Georgette Sigala, PT GP 1          PT G-Codes  Outcome Measure Options: AM-PAC 6 Clicks Basic Mobility (PT)  AM-PAC 6 Clicks Score: 18      Georgette Sigala PT  2/10/2019

## 2019-02-10 NOTE — PLAN OF CARE
"Problem: Patient Care Overview  Goal: Plan of Care Review   02/10/19 1850   Coping/Psychosocial   Plan of Care Reviewed With patient   Plan of Care Review   Progress improving   OTHER   Outcome Summary VSS. SR. Room air. Denies SOA. Pain treated per MAR. CTs removed today. Asked to remove pt's kimball, and he stated, \"I don't want it out today.\" Pt education regarding CAUTI provided. Pt agrees to have it removed in AM. Wt up 2 lbs and crackles present to BLL - 40 IV lasix ordered per cardiology. Lopressor increased, per Dr. Parsons. Wires remain isolated and taped. Pt increased ambulation today. CTM.         "

## 2019-02-10 NOTE — PLAN OF CARE
Problem: Patient Care Overview  Goal: Plan of Care Review  Outcome: Ongoing (interventions implemented as appropriate)    Goal: Individualization and Mutuality  Outcome: Ongoing (interventions implemented as appropriate)    Goal: Discharge Needs Assessment  Outcome: Ongoing (interventions implemented as appropriate)    Goal: Interprofessional Rounds/Family Conf  Outcome: Ongoing (interventions implemented as appropriate)      Problem: Skin Injury Risk (Adult)  Goal: Identify Related Risk Factors and Signs and Symptoms  Outcome: Ongoing (interventions implemented as appropriate)    Goal: Skin Health and Integrity  Outcome: Ongoing (interventions implemented as appropriate)      Problem: Cardiac Surgery (Adult)  Goal: Signs and Symptoms of Listed Potential Problems Will be Absent, Minimized or Managed (Cardiac Surgery)  Outcome: Ongoing (interventions implemented as appropriate)    Goal: Anesthesia/Sedation Recovery  Outcome: Ongoing (interventions implemented as appropriate)      Problem: Fall Risk (Adult)  Goal: Identify Related Risk Factors and Signs and Symptoms  Outcome: Ongoing (interventions implemented as appropriate)    Goal: Absence of Fall  Outcome: Ongoing (interventions implemented as appropriate)

## 2019-02-10 NOTE — PLAN OF CARE
Problem: Patient Care Overview  Goal: Plan of Care Review  Outcome: Ongoing (interventions implemented as appropriate)   02/10/19 1549   Coping/Psychosocial   Plan of Care Reviewed With patient   OTHER   Outcome Summary Pt able to ambulate in hallway this date. Demonstrating increase in pain with ambulation although VSS. RN notified of increase in pain. Predict pt will progress well with mobility and would benefit from  services at discharge.

## 2019-02-11 ENCOUNTER — APPOINTMENT (OUTPATIENT)
Dept: GENERAL RADIOLOGY | Facility: HOSPITAL | Age: 69
End: 2019-02-11

## 2019-02-11 LAB
ANION GAP SERPL CALCULATED.3IONS-SCNC: 16.2 MMOL/L
BUN BLD-MCNC: 17 MG/DL (ref 8–23)
BUN/CREAT SERPL: 16.7 (ref 7–25)
CALCIUM SPEC-SCNC: 9.1 MG/DL (ref 8.6–10.5)
CHLORIDE SERPL-SCNC: 99 MMOL/L (ref 98–107)
CO2 SERPL-SCNC: 25.8 MMOL/L (ref 22–29)
CREAT BLD-MCNC: 1.02 MG/DL (ref 0.76–1.27)
DEPRECATED RDW RBC AUTO: 44.6 FL (ref 37–54)
ERYTHROCYTE [DISTWIDTH] IN BLOOD BY AUTOMATED COUNT: 13.9 % (ref 11.5–14.5)
GFR SERPL CREATININE-BSD FRML MDRD: 72 ML/MIN/1.73
GLUCOSE BLD-MCNC: 264 MG/DL (ref 65–99)
GLUCOSE BLDC GLUCOMTR-MCNC: 159 MG/DL (ref 70–130)
GLUCOSE BLDC GLUCOMTR-MCNC: 185 MG/DL (ref 70–130)
GLUCOSE BLDC GLUCOMTR-MCNC: 231 MG/DL (ref 70–130)
GLUCOSE BLDC GLUCOMTR-MCNC: 300 MG/DL (ref 70–130)
HCT VFR BLD AUTO: 44 % (ref 40.4–52.2)
HGB BLD-MCNC: 14.7 G/DL (ref 13.7–17.6)
MCH RBC QN AUTO: 29.2 PG (ref 27–32.7)
MCHC RBC AUTO-ENTMCNC: 33.4 G/DL (ref 32.6–36.4)
MCV RBC AUTO: 87.3 FL (ref 79.8–96.2)
PLATELET # BLD AUTO: 215 10*3/MM3 (ref 140–500)
PMV BLD AUTO: 10.5 FL (ref 6–12)
POTASSIUM BLD-SCNC: 3.9 MMOL/L (ref 3.5–5.2)
RBC # BLD AUTO: 5.04 10*6/MM3 (ref 4.6–6)
SODIUM BLD-SCNC: 141 MMOL/L (ref 136–145)
WBC NRBC COR # BLD: 11.63 10*3/MM3 (ref 4.5–10.7)

## 2019-02-11 PROCEDURE — 80048 BASIC METABOLIC PNL TOTAL CA: CPT | Performed by: THORACIC SURGERY (CARDIOTHORACIC VASCULAR SURGERY)

## 2019-02-11 PROCEDURE — 85027 COMPLETE CBC AUTOMATED: CPT | Performed by: THORACIC SURGERY (CARDIOTHORACIC VASCULAR SURGERY)

## 2019-02-11 PROCEDURE — 99024 POSTOP FOLLOW-UP VISIT: CPT | Performed by: THORACIC SURGERY (CARDIOTHORACIC VASCULAR SURGERY)

## 2019-02-11 PROCEDURE — 99232 SBSQ HOSP IP/OBS MODERATE 35: CPT | Performed by: NURSE PRACTITIONER

## 2019-02-11 PROCEDURE — 71046 X-RAY EXAM CHEST 2 VIEWS: CPT

## 2019-02-11 PROCEDURE — 25010000002 ENOXAPARIN PER 10 MG: Performed by: THORACIC SURGERY (CARDIOTHORACIC VASCULAR SURGERY)

## 2019-02-11 PROCEDURE — 63710000001 INSULIN LISPRO (HUMAN) PER 5 UNITS: Performed by: THORACIC SURGERY (CARDIOTHORACIC VASCULAR SURGERY)

## 2019-02-11 PROCEDURE — 82962 GLUCOSE BLOOD TEST: CPT

## 2019-02-11 RX ORDER — POTASSIUM CHLORIDE 750 MG/1
20 CAPSULE, EXTENDED RELEASE ORAL DAILY
Status: DISCONTINUED | OUTPATIENT
Start: 2019-02-11 | End: 2019-02-13 | Stop reason: HOSPADM

## 2019-02-11 RX ORDER — METOPROLOL TARTRATE 50 MG/1
50 TABLET, FILM COATED ORAL EVERY 12 HOURS SCHEDULED
Status: DISCONTINUED | OUTPATIENT
Start: 2019-02-11 | End: 2019-02-13 | Stop reason: HOSPADM

## 2019-02-11 RX ORDER — TRAZODONE HYDROCHLORIDE 50 MG/1
50 TABLET ORAL NIGHTLY
Status: DISCONTINUED | OUTPATIENT
Start: 2019-02-11 | End: 2019-02-13 | Stop reason: HOSPADM

## 2019-02-11 RX ORDER — FUROSEMIDE 40 MG/1
40 TABLET ORAL DAILY
Status: DISCONTINUED | OUTPATIENT
Start: 2019-02-11 | End: 2019-02-13 | Stop reason: HOSPADM

## 2019-02-11 RX ORDER — DULOXETIN HYDROCHLORIDE 60 MG/1
60 CAPSULE, DELAYED RELEASE ORAL EVERY EVENING
Status: DISCONTINUED | OUTPATIENT
Start: 2019-02-11 | End: 2019-02-13 | Stop reason: HOSPADM

## 2019-02-11 RX ORDER — DULOXETIN HYDROCHLORIDE 30 MG/1
30 CAPSULE, DELAYED RELEASE ORAL DAILY
Status: DISCONTINUED | OUTPATIENT
Start: 2019-02-11 | End: 2019-02-13 | Stop reason: HOSPADM

## 2019-02-11 RX ADMIN — METFORMIN HYDROCHLORIDE 1000 MG: 1000 TABLET ORAL at 17:53

## 2019-02-11 RX ADMIN — FUROSEMIDE 40 MG: 40 TABLET ORAL at 10:32

## 2019-02-11 RX ADMIN — DULOXETINE HYDROCHLORIDE 60 MG: 60 CAPSULE, DELAYED RELEASE ORAL at 21:30

## 2019-02-11 RX ADMIN — INSULIN LISPRO 7 UNITS: 100 INJECTION, SOLUTION INTRAVENOUS; SUBCUTANEOUS at 12:25

## 2019-02-11 RX ADMIN — METOPROLOL TARTRATE 50 MG: 25 TABLET ORAL at 21:30

## 2019-02-11 RX ADMIN — ATORVASTATIN CALCIUM 40 MG: 20 TABLET, FILM COATED ORAL at 21:30

## 2019-02-11 RX ADMIN — HYDROCODONE BITARTRATE AND ACETAMINOPHEN 2 TABLET: 5; 325 TABLET ORAL at 17:53

## 2019-02-11 RX ADMIN — SENNOSIDES AND DOCUSATE SODIUM 2 TABLET: 8.6; 5 TABLET ORAL at 21:30

## 2019-02-11 RX ADMIN — LEVOTHYROXINE SODIUM 175 MCG: 175 TABLET ORAL at 06:51

## 2019-02-11 RX ADMIN — ALPRAZOLAM 0.25 MG: 0.25 TABLET ORAL at 06:22

## 2019-02-11 RX ADMIN — ENOXAPARIN SODIUM 40 MG: 40 INJECTION SUBCUTANEOUS at 17:53

## 2019-02-11 RX ADMIN — METOPROLOL TARTRATE 50 MG: 25 TABLET ORAL at 10:31

## 2019-02-11 RX ADMIN — ASPIRIN 81 MG: 81 TABLET, DELAYED RELEASE ORAL at 08:27

## 2019-02-11 RX ADMIN — DULOXETINE HYDROCHLORIDE 30 MG: 30 CAPSULE, DELAYED RELEASE ORAL at 10:32

## 2019-02-11 RX ADMIN — CYCLOBENZAPRINE 10 MG: 10 TABLET, FILM COATED ORAL at 07:09

## 2019-02-11 RX ADMIN — INSULIN LISPRO 2 UNITS: 100 INJECTION, SOLUTION INTRAVENOUS; SUBCUTANEOUS at 21:30

## 2019-02-11 RX ADMIN — POTASSIUM CHLORIDE 20 MEQ: 750 CAPSULE, EXTENDED RELEASE ORAL at 10:31

## 2019-02-11 RX ADMIN — TRAZODONE HYDROCHLORIDE 50 MG: 50 TABLET ORAL at 21:30

## 2019-02-11 RX ADMIN — HYDROCODONE BITARTRATE AND ACETAMINOPHEN 2 TABLET: 5; 325 TABLET ORAL at 00:33

## 2019-02-11 RX ADMIN — INSULIN LISPRO 2 UNITS: 100 INJECTION, SOLUTION INTRAVENOUS; SUBCUTANEOUS at 17:54

## 2019-02-11 RX ADMIN — MUPIROCIN 10 APPLICATION: 20 OINTMENT TOPICAL at 08:27

## 2019-02-11 RX ADMIN — MUPIROCIN 10 APPLICATION: 20 OINTMENT TOPICAL at 21:30

## 2019-02-11 RX ADMIN — METFORMIN HYDROCHLORIDE 1000 MG: 1000 TABLET ORAL at 10:31

## 2019-02-11 RX ADMIN — INSULIN LISPRO 6 UNITS: 100 INJECTION, SOLUTION INTRAVENOUS; SUBCUTANEOUS at 07:09

## 2019-02-11 RX ADMIN — HYDROCODONE BITARTRATE AND ACETAMINOPHEN 2 TABLET: 5; 325 TABLET ORAL at 11:42

## 2019-02-11 NOTE — PROGRESS NOTES
"    Patient Name: Spencer Sinha  :1950  68 y.o.      Patient Care Team:  Phil Gilliam MD as PCP - General (Internal Medicine)  Beverly Paredes MD as PCP - Claims Attributed  Argentina Prieto MD as Consulting Physician (Cardiology)    Chief Complaint: follow up coronary artery disease    Interval History: he is very frustrated this morning. He has received several meals that he can not eat due to lactose intolerance. Several home medications were not restarted on admission and he feels he is seeing repercussions from that with anxiety, lack of sleep, and elevated blood sugars. He denies shortness of breath.        Objective   Vital Signs  Temp:  [97.4 °F (36.3 °C)-98.6 °F (37 °C)] 98.2 °F (36.8 °C)  Heart Rate:  [75-93] 78  Resp:  [16-18] 18  BP: (116-147)/(74-79) 138/79    Intake/Output Summary (Last 24 hours) at 2019 1306  Last data filed at 2019 0630  Gross per 24 hour   Intake 480 ml   Output 1850 ml   Net -1370 ml     Flowsheet Rows      First Filed Value   Admission Height  180.3 cm (71\") Documented at 2019 0647   Admission Weight  89.6 kg (197 lb 8.5 oz) Documented at 2019 0647          Physical Exam:   General Appearance:    Alert, cooperative, in no acute distress   Lungs:     Clear to auscultation.  Normal respiratory effort and rate.      Heart:    Regular rhythm and normal rate, normal S1 and S2, no murmurs, gallops or rubs.     Chest Wall:    No abnormalities observed   Abdomen:     Soft, nontender, positive bowel sounds.     Extremities:   no cyanosis, clubbing  No marked joint deformities.  Adequate musculoskeletal strength.  1+pitting edema     Results Review:    Results from last 7 days   Lab Units 19  0555   SODIUM mmol/L 141   POTASSIUM mmol/L 3.9   CHLORIDE mmol/L 99   CO2 mmol/L 25.8   BUN mg/dL 17   CREATININE mg/dL 1.02   GLUCOSE mg/dL 264*   CALCIUM mg/dL 9.1         Results from last 7 days   Lab Units 19  0555   WBC 10*3/mm3 " 11.63*   HEMOGLOBIN g/dL 14.7   HEMATOCRIT % 44.0   PLATELETS 10*3/mm3 215     Results from last 7 days   Lab Units 02/09/19  0313 02/08/19  1434 02/07/19  0849   INR  1.15* 1.33* 0.95   APTT seconds  --  30.9 25.6     Results from last 7 days   Lab Units 02/09/19  0313   MAGNESIUM mg/dL 2.5*     Results from last 7 days   Lab Units 02/07/19  0849   CHOLESTEROL mg/dL 132   TRIGLYCERIDES mg/dL 193*   HDL CHOL mg/dL 37*   LDL CHOL mg/dL 56               Medication Review:     aspirin 81 mg Oral Daily   atorvastatin 40 mg Oral Nightly   DULoxetine 30 mg Oral Daily   DULoxetine 60 mg Oral Q PM   enoxaparin 40 mg Subcutaneous Q24H   furosemide 40 mg Oral Daily   insulin lispro 0-9 Units Subcutaneous 4x Daily With Meals & Nightly   levothyroxine 175 mcg Oral Daily   metFORMIN 1,000 mg Oral BID With Meals   metoprolol tartrate 50 mg Oral Q12H   mupirocin  Each Nare BID   potassium chloride 20 mEq Oral Daily   sennosides-docusate sodium 2 tablet Oral Nightly   traZODone 50 mg Oral Nightly             Assessment/Plan   1. Multivessel coronary disease - s/p CABG x 3 (2/8/19). Lower extremity edema noted. Oral lasix added. On ASA, statin, beta blocker.   2. S/p aortobifemoral bypass 2008  3. HLD - on satin  4. Hypothyroidism - synthroid  5. DM - home metformin has been restarted.   6. Anxiety/depression - home meds have been restarted.   7. Lactose intolerance - dietary supervisor to see patient later today to remedy the situation.     ANETA Espinosa  [facility]  02/11/19  1:06 PM

## 2019-02-11 NOTE — PLAN OF CARE
Problem: Patient Care Overview  Goal: Plan of Care Review  Outcome: Ongoing (interventions implemented as appropriate)   02/11/19 0401   Coping/Psychosocial   Plan of Care Reviewed With patient   Plan of Care Review   Progress improving   OTHER   Outcome Summary POD 3 CABG. VSS. Complaints of pain treated per MAR. Discussed with patient removing kimball. Pt agrees to have it removed before breakfast. Cath care provided.      Goal: Individualization and Mutuality  Outcome: Ongoing (interventions implemented as appropriate)    Goal: Discharge Needs Assessment  Outcome: Ongoing (interventions implemented as appropriate)    Goal: Interprofessional Rounds/Family Conf  Outcome: Ongoing (interventions implemented as appropriate)      Problem: Skin Injury Risk (Adult)  Goal: Identify Related Risk Factors and Signs and Symptoms  Outcome: Ongoing (interventions implemented as appropriate)    Goal: Skin Health and Integrity  Outcome: Ongoing (interventions implemented as appropriate)      Problem: Cardiac Surgery (Adult)  Goal: Signs and Symptoms of Listed Potential Problems Will be Absent, Minimized or Managed (Cardiac Surgery)  Outcome: Ongoing (interventions implemented as appropriate)    Goal: Anesthesia/Sedation Recovery  Outcome: Ongoing (interventions implemented as appropriate)      Problem: Fall Risk (Adult)  Goal: Identify Related Risk Factors and Signs and Symptoms  Outcome: Ongoing (interventions implemented as appropriate)    Goal: Absence of Fall  Outcome: Ongoing (interventions implemented as appropriate)

## 2019-02-11 NOTE — PROGRESS NOTES
LOS: 3 days   Patient Care Team:  Phil Gilliam MD as PCP - General (Internal Medicine)  Beverly Paredes MD as PCP - Claims Attributed  Argentina Prieto MD as Consulting Physician (Cardiology)    Chief Complaint: post op    Subjective      Vital Signs  Temp:  [97.4 °F (36.3 °C)-98.6 °F (37 °C)] 98.2 °F (36.8 °C)  Heart Rate:  [73-80] 80  Resp:  [16-18] 16  BP: (112-147)/(73-79) 147/79  Body mass index is 27.49 kg/m².    Intake/Output Summary (Last 24 hours) at 2/11/2019 0752  Last data filed at 2/11/2019 0630  Gross per 24 hour   Intake 600 ml   Output 1850 ml   Net -1250 ml     No intake/output data recorded.          02/08/19  0647 02/10/19  0611 02/11/19  0440   Weight: 89.6 kg (197 lb 8.5 oz) 90.4 kg (199 lb 6.4 oz) 89.4 kg (197 lb 1.6 oz)         Objective    Results Review:        WBC WBC   Date Value Ref Range Status   02/11/2019 11.63 (H) 4.50 - 10.70 10*3/mm3 Final   02/10/2019 12.25 (H) 4.50 - 10.70 10*3/mm3 Final   02/09/2019 9.47 4.50 - 10.70 10*3/mm3 Final   02/08/2019 12.13 (H) 4.50 - 10.70 10*3/mm3 Final   02/08/2019 7.48 4.50 - 10.70 10*3/mm3 Final      HGB Hemoglobin   Date Value Ref Range Status   02/11/2019 14.7 13.7 - 17.6 g/dL Final   02/10/2019 13.6 (L) 13.7 - 17.6 g/dL Final   02/09/2019 13.1 (L) 13.7 - 17.6 g/dL Final   02/08/2019 13.2 (L) 13.7 - 17.6 g/dL Final   02/08/2019 12.6 (L) 13.7 - 17.6 g/dL Final   02/08/2019 11.6 (L) 12.0 - 17.0 g/dL Final   02/08/2019 12.2 12.0 - 17.0 g/dL Final   02/08/2019 11.6 (L) 12.0 - 17.0 g/dL Final   02/08/2019 13.6 12.0 - 17.0 g/dL Final      HCT Hematocrit   Date Value Ref Range Status   02/11/2019 44.0 40.4 - 52.2 % Final   02/10/2019 43.3 40.4 - 52.2 % Final   02/09/2019 42.1 40.4 - 52.2 % Final   02/08/2019 40.8 40.4 - 52.2 % Final   02/08/2019 39.1 (L) 40.4 - 52.2 % Final   02/08/2019 34 (L) 38 - 51 % Final   02/08/2019 36 (L) 38 - 51 % Final   02/08/2019 34 (L) 38 - 51 % Final   02/08/2019 40 38 - 51 % Final      Platelets Platelets    Date Value Ref Range Status   02/11/2019 215 140 - 500 10*3/mm3 Final   02/10/2019 159 140 - 500 10*3/mm3 Final   02/09/2019 146 140 - 500 10*3/mm3 Final   02/08/2019 143 140 - 500 10*3/mm3 Final   02/08/2019 135 (L) 140 - 500 10*3/mm3 Final        PT/INR:    Protime   Date Value Ref Range Status   02/09/2019 14.5 (H) 11.7 - 14.2 Seconds Final   02/08/2019 16.2 (H) 11.7 - 14.2 Seconds Final   /  INR   Date Value Ref Range Status   02/09/2019 1.15 (H) 0.90 - 1.10 Final   02/08/2019 1.33 (H) 0.90 - 1.10 Final       Sodium Sodium   Date Value Ref Range Status   02/11/2019 141 136 - 145 mmol/L Final   02/10/2019 142 136 - 145 mmol/L Final   02/09/2019 146 (H) 136 - 145 mmol/L Final   02/08/2019 145 136 - 145 mmol/L Final   02/08/2019 140 136 - 145 mmol/L Final      Potassium Potassium   Date Value Ref Range Status   02/11/2019 3.9 3.5 - 5.2 mmol/L Final   02/10/2019 3.6 3.5 - 5.2 mmol/L Final   02/09/2019 4.2 3.5 - 5.2 mmol/L Final   02/08/2019 4.0 3.5 - 5.2 mmol/L Final   02/08/2019 3.5 3.5 - 5.2 mmol/L Final   02/08/2019 4.8 3.5 - 5.2 mmol/L Final      Chloride Chloride   Date Value Ref Range Status   02/11/2019 99 98 - 107 mmol/L Final   02/10/2019 104 98 - 107 mmol/L Final   02/09/2019 110 (H) 98 - 107 mmol/L Final   02/08/2019 109 (H) 98 - 107 mmol/L Final   02/08/2019 105 98 - 107 mmol/L Final      Bicarbonate CO2   Date Value Ref Range Status   02/11/2019 25.8 22.0 - 29.0 mmol/L Final   02/10/2019 25.2 22.0 - 29.0 mmol/L Final   02/09/2019 22.5 22.0 - 29.0 mmol/L Final   02/08/2019 22.9 22.0 - 29.0 mmol/L Final   02/08/2019 21.8 (L) 22.0 - 29.0 mmol/L Final      BUN BUN   Date Value Ref Range Status   02/11/2019 17 8 - 23 mg/dL Final   02/10/2019 14 8 - 23 mg/dL Final   02/09/2019 13 8 - 23 mg/dL Final   02/08/2019 16 8 - 23 mg/dL Final   02/08/2019 16 8 - 23 mg/dL Final      Creatinine Creatinine   Date Value Ref Range Status   02/11/2019 1.02 0.76 - 1.27 mg/dL Final   02/10/2019 1.03 0.76 - 1.27 mg/dL Final    02/09/2019 0.96 0.76 - 1.27 mg/dL Final   02/08/2019 0.92 0.76 - 1.27 mg/dL Final   02/08/2019 1.09 0.76 - 1.27 mg/dL Final      Calcium Calcium   Date Value Ref Range Status   02/11/2019 9.1 8.6 - 10.5 mg/dL Final   02/10/2019 9.2 8.6 - 10.5 mg/dL Final   02/09/2019 8.2 (L) 8.6 - 10.5 mg/dL Final   02/08/2019 8.5 (L) 8.6 - 10.5 mg/dL Final   02/08/2019 8.6 8.6 - 10.5 mg/dL Final      Magnesium Magnesium   Date Value Ref Range Status   02/09/2019 2.5 (H) 1.6 - 2.4 mg/dL Final   02/08/2019 2.1 1.6 - 2.4 mg/dL Final   02/08/2019 2.4 1.6 - 2.4 mg/dL Final   02/08/2019 2.3 1.6 - 2.4 mg/dL Final            aspirin 81 mg Oral Daily   atorvastatin 40 mg Oral Nightly   chlorhexidine 15 mL Mouth/Throat Q12H   DULoxetine 30 mg Oral Daily   enoxaparin 40 mg Subcutaneous Q24H   insulin lispro 0-9 Units Subcutaneous 4x Daily With Meals & Nightly   levothyroxine 175 mcg Oral Daily   metFORMIN 1,000 mg Oral BID With Meals   metoprolol tartrate 25 mg Oral Q12H   mupirocin  Each Nare BID   sennosides-docusate sodium 2 tablet Oral Nightly   traZODone 50 mg Oral Nightly              Patient Active Problem List   Diagnosis Code   • Chronic coronary artery disease I25.10   • Diplopia H53.2   • Graves disease E05.00   • 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 hyperinsulinemia E16.1   • Hyperlipidemia E78.5   • Hypertension I10   • Hypogonadism in male E29.1   • Postablative hypothyroidism E89.0   • Peripheral vascular disease (CMS/[facility]) I73.9   • Hyperinsulinism E16.1   • Uncontrolled type 2 diabetes mellitus with complication, without long-term current use of insulin (CMS/[facility]) E11.8, E11.65   • Hyperlipidemia associated with type 2 diabetes mellitus (CMS/[facility]) E11.69, E78.5   • Angina pectoris (CMS/HCC) I20.9   • Abnormal nuclear stress test R94.39   • Coronary artery disease of native heart with stable angina pectoris (CMS/HCC) I25.118       Assessment & Plan    -Severe CAD-s/p CABG x 3 with LIMA-LAD, right EVH of SVG- POD#3  Pagni  -Hypertension  -Hyperlipidemia  -hypothyroidism-on synthroid  -hx of tobacco abuse  -PAD- s/p stents-on plavix  -Diabetes type 2- on oral medications    Frustrated this AM.  Restarting cymbalta and trazodone, very upset these medications were not reordered over the weekend.  Restart home metformin with blood sugars in the 200s.  Increase beta blocker.  Add PO lasix.  Discontinue wires.    Bonny Blank, ANETA  02/11/19  7:52 AM

## 2019-02-11 NOTE — PLAN OF CARE
Problem: Patient Care Overview  Goal: Plan of Care Review  Outcome: Ongoing (interventions implemented as appropriate)   02/11/19 1839   Coping/Psychosocial   Plan of Care Reviewed With patient   Plan of Care Review   Progress improving   OTHER   Outcome Summary Pt wires removed per order. Pt ambulating around unit independently. Steady gair. Patient upset this morning due to home medications not being renewed. Medications addressed with Attending and restarted. Patient resting in bed. VSS will continue to monitor 2/11/2019 @ 1844     Goal: Individualization and Mutuality  Outcome: Ongoing (interventions implemented as appropriate)   02/11/19 1839   Individualization   Patient Specific Preferences None at this time 2/11/2019 @ 1841       Problem: Skin Injury Risk (Adult)  Goal: Identify Related Risk Factors and Signs and Symptoms  Outcome: Ongoing (interventions implemented as appropriate)    Goal: Skin Health and Integrity  Outcome: Ongoing (interventions implemented as appropriate)   02/11/19 1839   Skin Injury Risk (Adult)   Skin Health and Integrity making progress toward outcome       Problem: Cardiac Surgery (Adult)  Goal: Signs and Symptoms of Listed Potential Problems Will be Absent, Minimized or Managed (Cardiac Surgery)  Outcome: Ongoing (interventions implemented as appropriate)   02/11/19 1839   Goal/Outcome Evaluation   Problems Assessed (Cardiac Surgery) all   Problems Present (Cardiac Surgery) situational response;pain       Problem: Fall Risk (Adult)  Goal: Identify Related Risk Factors and Signs and Symptoms  Outcome: Ongoing (interventions implemented as appropriate)   02/11/19 1839   Fall Risk (Adult)   Related Risk Factors (Fall Risk) environment unfamiliar   Signs and Symptoms (Fall Risk) presence of risk factors     Goal: Absence of Fall  Outcome: Ongoing (interventions implemented as appropriate)   02/11/19 1839   Fall Risk (Adult)   Absence of Fall making progress toward outcome

## 2019-02-11 NOTE — NURSING NOTE
Spoke to catering supervisor regarding patient diet. Patient marked as lactose restricted on diet order, however patient was presented with cheese omelette for breakfast and then baked ziti with cheese for lunch. Alternative order place for patient. Catering supervisor to come and see patient this afternoon.

## 2019-02-11 NOTE — NURSING NOTE
Spoke to ANETA Klein with cardiothoracic surgery regarding patient home medications. Bonny reports she will resume home medications that are appropriate at this time

## 2019-02-11 NOTE — DISCHARGE PLACEMENT REQUEST
"Spencer Sinha (68 y.o. Male)     Date of Birth Social Security Number Address Home Phone MRN    [id number]  [id number] [facility] [address] [phone] [id number]    [employer] Marital Status          Catholic        Admission Date Admission Type Admitting Provider Attending Provider Department, Room/Bed    2/8/19 Elective Nathaniel Crawford MD Pagni, Sebastian, MD [facility] CARDIOVASC UNIT, 2228/1    Discharge Date Discharge Disposition Discharge Destination                       Attending Provider:  Nathaniel Crawford MD    Allergies:  Lactose Intolerance (Gi)    Isolation:  None   Infection:  None   Code Status:  CPR    Ht:  180.3 cm (71\")   Wt:  89.4 kg (197 lb 1.6 oz)    Admission Cmt:  None   Principal Problem:  Coronary artery disease of native heart with stable angina pectoris (CMS/[facility]) [I25.118] More...                 Active Insurance as of 2/8/2019     Primary Coverage     Payor Plan Insurance Group Employer/Plan Group    MEDICARE MEDICARE A & B      Payor Plan Address Payor Plan Phone Number Payor Plan Fax Number Effective Dates    [address] [phone]  3/1/2014 - None Entered    [facility] [id number]       Subscriber Name Subscriber Birth Date Member ID       SPENCER SINHA 1950 [id number]           Secondary Coverage     Payor Plan Insurance Group Employer/Plan Group    [facility] SUPP [id number]     Payor Plan Address Payor Plan Phone Number Payor Plan Fax Number Effective Dates    [address]   12/1/2016 - None Entered    [facility] [id number]       Subscriber Name Subscriber Birth Date Member ID       SPENCER SINHA 1950 [id number]                 Emergency Contacts      (Rel.) Home Phone Work Phone Mobile Phone    BharathRadha (Spouse) [phone] -- [phone]            "

## 2019-02-11 NOTE — CONSULTS
"Met with patient, to discuss benefits of cardiac rehab. Patient not interested at this time. Provided phase II information packet, which includes; general information about cardiac rehab, [facility]iac [facility] handout and [facility] letter article entitled “Cardiac Rehab is often the Best Medicine for Recovery\", stresses the importance of cardiac rehab after a heart event.   I provided the contact information for cardiac rehab here at [facility].        "

## 2019-02-12 LAB
ABO + RH BLD: NORMAL
ABO + RH BLD: NORMAL
ANION GAP SERPL CALCULATED.3IONS-SCNC: 11.9 MMOL/L
BH BB BLOOD EXPIRATION DATE: NORMAL
BH BB BLOOD EXPIRATION DATE: NORMAL
BH BB BLOOD TYPE BARCODE: 5100
BH BB BLOOD TYPE BARCODE: 5100
BH BB DISPENSE STATUS: NORMAL
BH BB DISPENSE STATUS: NORMAL
BH BB PRODUCT CODE: NORMAL
BH BB PRODUCT CODE: NORMAL
BH BB UNIT NUMBER: NORMAL
BH BB UNIT NUMBER: NORMAL
BUN BLD-MCNC: 17 MG/DL (ref 8–23)
BUN/CREAT SERPL: 17.5 (ref 7–25)
CALCIUM SPEC-SCNC: 8.8 MG/DL (ref 8.6–10.5)
CHLORIDE SERPL-SCNC: 101 MMOL/L (ref 98–107)
CO2 SERPL-SCNC: 26.1 MMOL/L (ref 22–29)
CREAT BLD-MCNC: 0.97 MG/DL (ref 0.76–1.27)
CROSSMATCH INTERPRETATION: NORMAL
CROSSMATCH INTERPRETATION: NORMAL
DEPRECATED RDW RBC AUTO: 42.4 FL (ref 37–54)
ERYTHROCYTE [DISTWIDTH] IN BLOOD BY AUTOMATED COUNT: 13.5 % (ref 12.3–15.4)
GFR SERPL CREATININE-BSD FRML MDRD: 77 ML/MIN/1.73
GLUCOSE BLD-MCNC: 201 MG/DL (ref 65–99)
GLUCOSE BLDC GLUCOMTR-MCNC: 181 MG/DL (ref 70–130)
GLUCOSE BLDC GLUCOMTR-MCNC: 215 MG/DL (ref 70–130)
GLUCOSE BLDC GLUCOMTR-MCNC: 224 MG/DL (ref 70–130)
GLUCOSE BLDC GLUCOMTR-MCNC: 235 MG/DL (ref 70–130)
HCT VFR BLD AUTO: 39.5 % (ref 37.5–51)
HGB BLD-MCNC: 12.7 G/DL (ref 13–17.7)
MCH RBC QN AUTO: 27.7 PG (ref 26.6–33)
MCHC RBC AUTO-ENTMCNC: 32.2 G/DL (ref 31.5–35.7)
MCV RBC AUTO: 86.2 FL (ref 79–97)
PLATELET # BLD AUTO: 202 10*3/MM3 (ref 140–450)
PMV BLD AUTO: 10.3 FL (ref 6–12)
POTASSIUM BLD-SCNC: 3.9 MMOL/L (ref 3.5–5.2)
RBC # BLD AUTO: 4.58 10*6/MM3 (ref 4.14–5.8)
SODIUM BLD-SCNC: 139 MMOL/L (ref 136–145)
UNIT  ABO: NORMAL
UNIT  ABO: NORMAL
UNIT  RH: NORMAL
UNIT  RH: NORMAL
WBC NRBC COR # BLD: 9.26 10*3/MM3 (ref 3.4–10.8)

## 2019-02-12 PROCEDURE — 93005 ELECTROCARDIOGRAM TRACING: CPT | Performed by: THORACIC SURGERY (CARDIOTHORACIC VASCULAR SURGERY)

## 2019-02-12 PROCEDURE — 85027 COMPLETE CBC AUTOMATED: CPT | Performed by: THORACIC SURGERY (CARDIOTHORACIC VASCULAR SURGERY)

## 2019-02-12 PROCEDURE — 80048 BASIC METABOLIC PNL TOTAL CA: CPT | Performed by: THORACIC SURGERY (CARDIOTHORACIC VASCULAR SURGERY)

## 2019-02-12 PROCEDURE — 25010000002 ENOXAPARIN PER 10 MG: Performed by: THORACIC SURGERY (CARDIOTHORACIC VASCULAR SURGERY)

## 2019-02-12 PROCEDURE — 93010 ELECTROCARDIOGRAM REPORT: CPT | Performed by: INTERNAL MEDICINE

## 2019-02-12 PROCEDURE — 99024 POSTOP FOLLOW-UP VISIT: CPT | Performed by: THORACIC SURGERY (CARDIOTHORACIC VASCULAR SURGERY)

## 2019-02-12 PROCEDURE — 99232 SBSQ HOSP IP/OBS MODERATE 35: CPT | Performed by: INTERNAL MEDICINE

## 2019-02-12 PROCEDURE — 82962 GLUCOSE BLOOD TEST: CPT

## 2019-02-12 RX ORDER — GLIPIZIDE 5 MG/1
2.5 TABLET ORAL ONCE
Status: COMPLETED | OUTPATIENT
Start: 2019-02-12 | End: 2019-02-12

## 2019-02-12 RX ORDER — AMIODARONE HYDROCHLORIDE 200 MG/1
400 TABLET ORAL EVERY 12 HOURS SCHEDULED
Status: DISCONTINUED | OUTPATIENT
Start: 2019-02-12 | End: 2019-02-13

## 2019-02-12 RX ORDER — GLIPIZIDE 5 MG/1
2.5 TABLET ORAL
Status: DISCONTINUED | OUTPATIENT
Start: 2019-02-13 | End: 2019-02-13 | Stop reason: HOSPADM

## 2019-02-12 RX ORDER — GLIPIZIDE 5 MG/1
2.5 TABLET ORAL
Status: DISCONTINUED | OUTPATIENT
Start: 2019-02-12 | End: 2019-02-12

## 2019-02-12 RX ORDER — DIPHENOXYLATE HYDROCHLORIDE AND ATROPINE SULFATE 2.5; .025 MG/1; MG/1
1 TABLET ORAL DAILY
Status: DISCONTINUED | OUTPATIENT
Start: 2019-02-12 | End: 2019-02-13 | Stop reason: HOSPADM

## 2019-02-12 RX ORDER — HYDROCODONE BITARTRATE AND ACETAMINOPHEN 5; 325 MG/1; MG/1
1 TABLET ORAL EVERY 4 HOURS PRN
Qty: 40 TABLET | Refills: 0 | Status: SHIPPED | OUTPATIENT
Start: 2019-02-12 | End: 2019-03-07

## 2019-02-12 RX ADMIN — ATORVASTATIN CALCIUM 40 MG: 20 TABLET, FILM COATED ORAL at 20:51

## 2019-02-12 RX ADMIN — ENOXAPARIN SODIUM 40 MG: 40 INJECTION SUBCUTANEOUS at 17:57

## 2019-02-12 RX ADMIN — GLIPIZIDE 2.5 MG: 5 TABLET ORAL at 18:06

## 2019-02-12 RX ADMIN — ALPRAZOLAM 0.25 MG: 0.25 TABLET ORAL at 05:58

## 2019-02-12 RX ADMIN — ASPIRIN 81 MG: 81 TABLET, DELAYED RELEASE ORAL at 08:44

## 2019-02-12 RX ADMIN — METOPROLOL TARTRATE 50 MG: 25 TABLET ORAL at 20:51

## 2019-02-12 RX ADMIN — METOPROLOL TARTRATE 50 MG: 25 TABLET ORAL at 05:55

## 2019-02-12 RX ADMIN — METFORMIN HYDROCHLORIDE 1000 MG: 1000 TABLET ORAL at 17:57

## 2019-02-12 RX ADMIN — FUROSEMIDE 40 MG: 40 TABLET ORAL at 08:44

## 2019-02-12 RX ADMIN — TRAZODONE HYDROCHLORIDE 50 MG: 50 TABLET ORAL at 20:51

## 2019-02-12 RX ADMIN — POTASSIUM CHLORIDE 20 MEQ: 750 CAPSULE, EXTENDED RELEASE ORAL at 08:44

## 2019-02-12 RX ADMIN — DULOXETINE HYDROCHLORIDE 30 MG: 30 CAPSULE, DELAYED RELEASE ORAL at 08:44

## 2019-02-12 RX ADMIN — Medication 1 TABLET: at 14:15

## 2019-02-12 RX ADMIN — METFORMIN HYDROCHLORIDE 1000 MG: 1000 TABLET ORAL at 08:45

## 2019-02-12 RX ADMIN — DULOXETINE HYDROCHLORIDE 60 MG: 60 CAPSULE, DELAYED RELEASE ORAL at 20:51

## 2019-02-12 RX ADMIN — AMIODARONE HYDROCHLORIDE 400 MG: 200 TABLET ORAL at 20:52

## 2019-02-12 RX ADMIN — SENNOSIDES AND DOCUSATE SODIUM 2 TABLET: 8.6; 5 TABLET ORAL at 20:51

## 2019-02-12 RX ADMIN — LEVOTHYROXINE SODIUM 175 MCG: 175 TABLET ORAL at 05:55

## 2019-02-12 RX ADMIN — AMIODARONE HYDROCHLORIDE 400 MG: 200 TABLET ORAL at 12:37

## 2019-02-12 RX ADMIN — HYDROCODONE BITARTRATE AND ACETAMINOPHEN 2 TABLET: 5; 325 TABLET ORAL at 08:46

## 2019-02-12 NOTE — PLAN OF CARE
Problem: Patient Care Overview  Goal: Plan of Care Review  Outcome: Ongoing (interventions implemented as appropriate)   02/12/19 0631   Coping/Psychosocial   Plan of Care Reviewed With patient   Plan of Care Review   Progress no change   OTHER   Outcome Summary Pt converted to afib -170, lopressor given early per [place] cardiology rounding nurse. No falls. No c/o pain. Pt had BM during shift. Pt resting comfortably. Monitoring closely.        Problem: Skin Injury Risk (Adult)  Goal: Identify Related Risk Factors and Signs and Symptoms  Outcome: Outcome(s) achieved Date Met: 02/12/19 02/12/19 0631   Skin Injury Risk (Adult)   Related Risk Factors (Skin Injury Risk) hospitalization prolonged;mobility impaired     Goal: Skin Health and Integrity  Outcome: Ongoing (interventions implemented as appropriate)   02/12/19 0631   Skin Injury Risk (Adult)   Skin Health and Integrity making progress toward outcome       Problem: Cardiac Surgery (Adult)  Goal: Signs and Symptoms of Listed Potential Problems Will be Absent, Minimized or Managed (Cardiac Surgery)  Outcome: Ongoing (interventions implemented as appropriate)   02/12/19 0631   Goal/Outcome Evaluation   Problems Assessed (Cardiac Surgery) all   Problems Present (Cardiac Surgery) situational response;cardiac complications       Problem: Fall Risk (Adult)  Goal: Identify Related Risk Factors and Signs and Symptoms  Outcome: Outcome(s) achieved Date Met: 02/12/19 02/12/19 0631   Fall Risk (Adult)   Related Risk Factors (Fall Risk) sleep pattern alteration;slippery/uneven surfaces;environment unfamiliar   Signs and Symptoms (Fall Risk) presence of risk factors     Goal: Absence of Fall  Outcome: Ongoing (interventions implemented as appropriate)   02/12/19 0631   Fall Risk (Adult)   Absence of Fall making progress toward outcome

## 2019-02-12 NOTE — PROGRESS NOTES
LOS: 4 days   Patient Care Team:  Phil Gilliam MD as PCP - General (Internal Medicine)  Beverly Paredes MD as PCP - Claims Attributed  Argentina Prieto MD as Consulting Physician (Cardiology)    Chief Complaint:post op    Subjective      Vital Signs  Temp:  [97.7 °F (36.5 °C)-98 °F (36.7 °C)] 97.9 °F (36.6 °C)  Heart Rate:  [] 91  Resp:  [18-20] 20  BP: ()/(60-87) 91/60  Body mass index is 27.49 kg/m².    Intake/Output Summary (Last 24 hours) at 2/12/2019 1143  Last data filed at 2/12/2019 0739  Gross per 24 hour   Intake 450 ml   Output --   Net 450 ml     I/O this shift:  In: 210 [P.O.:210]  Out: -           02/08/19  0647 02/10/19  0611 02/11/19  0440   Weight: 89.6 kg (197 lb 8.5 oz) 90.4 kg (199 lb 6.4 oz) 89.4 kg (197 lb 1.6 oz)         Objective    Results Review:        WBC WBC   Date Value Ref Range Status   02/12/2019 9.26 3.40 - 10.80 10*3/mm3 Final   02/11/2019 11.63 (H) 4.50 - 10.70 10*3/mm3 Final   02/10/2019 12.25 (H) 4.50 - 10.70 10*3/mm3 Final      HGB Hemoglobin   Date Value Ref Range Status   02/12/2019 12.7 (L) 13.0 - 17.7 g/dL Final   02/11/2019 14.7 13.7 - 17.6 g/dL Final   02/10/2019 13.6 (L) 13.7 - 17.6 g/dL Final      HCT Hematocrit   Date Value Ref Range Status   02/12/2019 39.5 37.5 - 51.0 % Final   02/11/2019 44.0 40.4 - 52.2 % Final   02/10/2019 43.3 40.4 - 52.2 % Final      Platelets Platelets   Date Value Ref Range Status   02/12/2019 202 140 - 450 10*3/mm3 Final   02/11/2019 215 140 - 500 10*3/mm3 Final   02/10/2019 159 140 - 500 10*3/mm3 Final        PT/INR:  No results found for: PROTIME/No results found for: INR    Sodium Sodium   Date Value Ref Range Status   02/12/2019 139 136 - 145 mmol/L Final   02/11/2019 141 136 - 145 mmol/L Final   02/10/2019 142 136 - 145 mmol/L Final      Potassium Potassium   Date Value Ref Range Status   02/12/2019 3.9 3.5 - 5.2 mmol/L Final   02/11/2019 3.9 3.5 - 5.2 mmol/L Final   02/10/2019 3.6 3.5 - 5.2 mmol/L Final       Chloride Chloride   Date Value Ref Range Status   02/12/2019 101 98 - 107 mmol/L Final   02/11/2019 99 98 - 107 mmol/L Final   02/10/2019 104 98 - 107 mmol/L Final      Bicarbonate CO2   Date Value Ref Range Status   02/12/2019 26.1 22.0 - 29.0 mmol/L Final   02/11/2019 25.8 22.0 - 29.0 mmol/L Final   02/10/2019 25.2 22.0 - 29.0 mmol/L Final      BUN BUN   Date Value Ref Range Status   02/12/2019 17 8 - 23 mg/dL Final   02/11/2019 17 8 - 23 mg/dL Final   02/10/2019 14 8 - 23 mg/dL Final      Creatinine Creatinine   Date Value Ref Range Status   02/12/2019 0.97 0.76 - 1.27 mg/dL Final   02/11/2019 1.02 0.76 - 1.27 mg/dL Final   02/10/2019 1.03 0.76 - 1.27 mg/dL Final      Calcium Calcium   Date Value Ref Range Status   02/12/2019 8.8 8.6 - 10.5 mg/dL Final   02/11/2019 9.1 8.6 - 10.5 mg/dL Final   02/10/2019 9.2 8.6 - 10.5 mg/dL Final      Magnesium No results found for: MG         amiodarone 400 mg Oral Q12H   amiodarone 150 mg Intravenous Once   aspirin 81 mg Oral Daily   atorvastatin 40 mg Oral Nightly   DULoxetine 30 mg Oral Daily   DULoxetine 60 mg Oral Q PM   enoxaparin 40 mg Subcutaneous Q24H   furosemide 40 mg Oral Daily   glipiZIDE 2.5 mg Oral QAM AC   insulin lispro 0-9 Units Subcutaneous 4x Daily With Meals & Nightly   levothyroxine 175 mcg Oral Daily   metFORMIN 1,000 mg Oral BID With Meals   metoprolol tartrate 50 mg Oral Q12H   multivitamin 1 tablet Oral Daily   mupirocin  Each Nare BID   NON FORMULARY 1.8 mg Subcutaneous Daily   potassium chloride 20 mEq Oral Daily   sennosides-docusate sodium 2 tablet Oral Nightly   traZODone 50 mg Oral Nightly              Patient Active Problem List   Diagnosis Code   • Chronic coronary artery disease I25.10   • Diplopia H53.2   • Graves disease E05.00   • 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 hyperinsulinemia E16.1   • Hyperlipidemia E78.5   • Hypertension I10   • Hypogonadism in male E29.1   • Postablative hypothyroidism E89.0   • Peripheral vascular disease (CMS/HCC)  I73.9   • Hyperinsulinism E16.1   • Uncontrolled type 2 diabetes mellitus with complication, without long-term current use of insulin (CMS/[facility]) E11.8, E11.65   • Hyperlipidemia associated with type 2 diabetes mellitus (CMS/[facility]) E11.69, E78.5   • Angina pectoris (CMS/[facility]) I20.9   • Abnormal nuclear stress test R94.39   • Coronary artery disease of native heart with stable angina pectoris (CMS/[facility]) I25.118       Assessment & Plan    -Severe CAD-s/p CABG x 3 with LIMA-LAD, right EVH of SVG- POD#4 Ty  -Hypertension  -Hyperlipidemia  -hypothyroidism-on synthroid  -hx of tobacco abuse  -PAD- s/p stents-on plavix  -Diabetes type 2- on oral medications     Brief episode of atrial fibrillation this morning converted after amiodarone bolus and po amiodarone. Patient upset about his diabetic medications not being ordered.  May need to watch one more day due to rhythm changes this morning, will discuss with Dr. Crawford.         Bonny Blank, ANETA  02/12/19  11:43 AM

## 2019-02-12 NOTE — PROGRESS NOTES
LOS: 4 days   Patient Care Team:  Phil Gilliam MD as PCP - General (Internal Medicine)  Beverly Paredes MD as PCP - Claims Attributed  Argentina Prieto MD as Consulting Physician (Cardiology)    Chief Complaint:     F/u CABG    Interval History:     He is short winded and dizzy this morning.  He has no chest pain or pressure.  He denies nausea vomiting.  He is fatigued.    Objective   Vital Signs  Temp:  [97.7 °F (36.5 °C)-98 °F (36.7 °C)] 98 °F (36.7 °C)  Heart Rate:  [] 150  Resp:  [18-20] 20  BP: (100-142)/(72-87) 102/72    Intake/Output Summary (Last 24 hours) at 2/12/2019 0731  Last data filed at 2/11/2019 2119  Gross per 24 hour   Intake 240 ml   Output --   Net 240 ml       Comfortable NAD  Neck supple, no JVD or thyromegaly appreciated  S1/S2 irregular and tachy, no m/r/g  Lungs CTA B, normal effort  Abdomen S/NT/ND (+) BS, no HSM appreciated  Extremities warm, no clubbing, cyanosis, or edema  No visible or palpable skin lesions  A/Ox4, mood and affect appropriate    Results Review:      Results from last 7 days   Lab Units 02/12/19  0310 02/11/19  0555 02/10/19  0350   SODIUM mmol/L 139 141 142   POTASSIUM mmol/L 3.9 3.9 3.6   CHLORIDE mmol/L 101 99 104   CO2 mmol/L 26.1 25.8 25.2   BUN mg/dL 17 17 14   CREATININE mg/dL 0.97 1.02 1.03   GLUCOSE mg/dL 201* 264* 163*   CALCIUM mg/dL 8.8 9.1 9.2         Results from last 7 days   Lab Units 02/12/19  0310 02/11/19  0555 02/10/19  0349   WBC 10*3/mm3 9.26 11.63* 12.25*   HEMOGLOBIN g/dL 12.7* 14.7 13.6*   HEMATOCRIT % 39.5 44.0 43.3   PLATELETS 10*3/mm3 202 215 159     Results from last 7 days   Lab Units 02/09/19  0313 02/08/19  1434 02/07/19  0849   INR  1.15* 1.33* 0.95   APTT seconds  --  30.9 25.6     Results from last 7 days   Lab Units 02/07/19  0849   CHOLESTEROL mg/dL 132     Results from last 7 days   Lab Units 02/09/19  0313   MAGNESIUM mg/dL 2.5*     Results from last 7 days   Lab Units 02/07/19  0849   CHOLESTEROL mg/dL 132    TRIGLYCERIDES mg/dL 193*   HDL CHOL mg/dL 37*   LDL CHOL mg/dL 56       I reviewed the patient's new clinical results.  I personally viewed and interpreted the patient's EKG/Telemetry data        Medication Review:     aspirin 81 mg Oral Daily   atorvastatin 40 mg Oral Nightly   DULoxetine 30 mg Oral Daily   DULoxetine 60 mg Oral Q PM   enoxaparin 40 mg Subcutaneous Q24H   furosemide 40 mg Oral Daily   insulin lispro 0-9 Units Subcutaneous 4x Daily With Meals & Nightly   levothyroxine 175 mcg Oral Daily   metFORMIN 1,000 mg Oral BID With Meals   metoprolol tartrate 50 mg Oral Q12H   mupirocin  Each Nare BID   potassium chloride 20 mEq Oral Daily   sennosides-docusate sodium 2 tablet Oral Nightly   traZODone 50 mg Oral Nightly            Assessment/Plan       Coronary artery disease of native heart with stable angina pectoris (CMS/[facility])       1. Multivessel coronary disease - s/p CABG x 3 (2/8/19). Lower extremity edema noted. Oral lasix added. On ASA, statin, beta blocker.   2. S/p aortobifemoral bypass 2008  3. HLD - on statin  4. Hypothyroidism - synthroid  5. DM - home metformin has been restarted.   6. Anxiety/depression - home meds have been restarted.   7. Lactose intolerance - dietary supervisor to see patient later today to remedy the situation.  8. A fib with RVR today.  Start amiodarone 400mg bid    Will follow. If still dyspneic with lower heart rate, may need dose of Iv lasix.            Argentina Prieto MD  02/12/19  7:31 AM

## 2019-02-12 NOTE — PLAN OF CARE
Problem: Patient Care Overview  Goal: Plan of Care Review  Outcome: Ongoing (interventions implemented as appropriate)   02/12/19 1821   Coping/Psychosocial   Plan of Care Reviewed With patient   Plan of Care Review   Progress no change   OTHER   Outcome Summary Patient conversion back to NSR around 0853. MD notified. PO andreinao ordered. Patient pain well controlled. Patient ambulating well. VSS will continue to monitor 2/12/2019 @ 1823     Goal: Individualization and Mutuality  Outcome: Ongoing (interventions implemented as appropriate)   02/12/19 1821   Individualization   Patient Specific Preferences none at this time 2/11/2019 @ 1821       Problem: Skin Injury Risk (Adult)  Goal: Skin Health and Integrity  Outcome: Ongoing (interventions implemented as appropriate)   02/12/19 1821   Skin Injury Risk (Adult)   Skin Health and Integrity making progress toward outcome       Problem: Cardiac Surgery (Adult)  Goal: Anesthesia/Sedation Recovery  Outcome: Ongoing (interventions implemented as appropriate)   02/12/19 1821   Goal/Outcome Evaluation   Anesthesia/Sedation Recovery recovered to baseline       Problem: Fall Risk (Adult)  Goal: Absence of Fall  Outcome: Ongoing (interventions implemented as appropriate)   02/12/19 1821   Fall Risk (Adult)   Absence of Fall making progress toward outcome

## 2019-02-13 VITALS
RESPIRATION RATE: 18 BRPM | SYSTOLIC BLOOD PRESSURE: 119 MMHG | DIASTOLIC BLOOD PRESSURE: 72 MMHG | HEIGHT: 71 IN | OXYGEN SATURATION: 94 % | HEART RATE: 72 BPM | BODY MASS INDEX: 27.88 KG/M2 | TEMPERATURE: 97.6 F | WEIGHT: 199.13 LBS

## 2019-02-13 LAB
ANION GAP SERPL CALCULATED.3IONS-SCNC: 9.9 MMOL/L
BUN BLD-MCNC: 17 MG/DL (ref 8–23)
BUN/CREAT SERPL: 17.2 (ref 7–25)
CALCIUM SPEC-SCNC: 8.8 MG/DL (ref 8.6–10.5)
CHLORIDE SERPL-SCNC: 102 MMOL/L (ref 98–107)
CO2 SERPL-SCNC: 29.1 MMOL/L (ref 22–29)
CREAT BLD-MCNC: 0.99 MG/DL (ref 0.76–1.27)
GFR SERPL CREATININE-BSD FRML MDRD: 75 ML/MIN/1.73
GLUCOSE BLD-MCNC: 143 MG/DL (ref 65–99)
GLUCOSE BLDC GLUCOMTR-MCNC: 141 MG/DL (ref 70–130)
POTASSIUM BLD-SCNC: 3.7 MMOL/L (ref 3.5–5.2)
SODIUM BLD-SCNC: 141 MMOL/L (ref 136–145)

## 2019-02-13 PROCEDURE — 80048 BASIC METABOLIC PNL TOTAL CA: CPT | Performed by: THORACIC SURGERY (CARDIOTHORACIC VASCULAR SURGERY)

## 2019-02-13 PROCEDURE — 82962 GLUCOSE BLOOD TEST: CPT

## 2019-02-13 PROCEDURE — 99232 SBSQ HOSP IP/OBS MODERATE 35: CPT | Performed by: INTERNAL MEDICINE

## 2019-02-13 RX ORDER — AMIODARONE HYDROCHLORIDE 200 MG/1
400 TABLET ORAL
Qty: 60 TABLET | Refills: 0 | Status: SHIPPED | OUTPATIENT
Start: 2019-02-14 | End: 2019-04-10

## 2019-02-13 RX ORDER — FUROSEMIDE 40 MG/1
40 TABLET ORAL DAILY
Qty: 30 TABLET | Refills: 0 | Status: SHIPPED | OUTPATIENT
Start: 2019-02-14 | End: 2019-04-10

## 2019-02-13 RX ORDER — AMIODARONE HYDROCHLORIDE 200 MG/1
400 TABLET ORAL
Status: DISCONTINUED | OUTPATIENT
Start: 2019-02-14 | End: 2019-02-13 | Stop reason: HOSPADM

## 2019-02-13 RX ORDER — METOPROLOL TARTRATE 50 MG/1
50 TABLET, FILM COATED ORAL EVERY 12 HOURS SCHEDULED
Qty: 60 TABLET | Refills: 2 | Status: SHIPPED | OUTPATIENT
Start: 2019-02-13 | End: 2019-05-14

## 2019-02-13 RX ORDER — POTASSIUM CHLORIDE 750 MG/1
20 CAPSULE, EXTENDED RELEASE ORAL DAILY
Qty: 60 CAPSULE | Refills: 0 | Status: SHIPPED | OUTPATIENT
Start: 2019-02-14 | End: 2019-03-30

## 2019-02-13 RX ADMIN — POTASSIUM CHLORIDE 20 MEQ: 750 CAPSULE, EXTENDED RELEASE ORAL at 08:40

## 2019-02-13 RX ADMIN — AMIODARONE HYDROCHLORIDE 400 MG: 200 TABLET ORAL at 08:39

## 2019-02-13 RX ADMIN — Medication 1 TABLET: at 08:39

## 2019-02-13 RX ADMIN — METFORMIN HYDROCHLORIDE 1000 MG: 1000 TABLET ORAL at 08:39

## 2019-02-13 RX ADMIN — LEVOTHYROXINE SODIUM 175 MCG: 175 TABLET ORAL at 06:27

## 2019-02-13 RX ADMIN — FUROSEMIDE 40 MG: 40 TABLET ORAL at 08:39

## 2019-02-13 RX ADMIN — DULOXETINE HYDROCHLORIDE 30 MG: 30 CAPSULE, DELAYED RELEASE ORAL at 08:39

## 2019-02-13 RX ADMIN — ASPIRIN 81 MG: 81 TABLET, DELAYED RELEASE ORAL at 08:39

## 2019-02-13 RX ADMIN — METOPROLOL TARTRATE 50 MG: 25 TABLET ORAL at 08:39

## 2019-02-13 NOTE — PROGRESS NOTES
LOS: 5 days   Patient Care Team:  Phil Gilliam MD as PCP - General (Internal Medicine)  Beverly Paredes MD as PCP - Claims Attributed  Argentina Prieto MD as Consulting Physician (Cardiology)    Chief Complaint:     F/u CABG    Interval History:     He has no chest pain, tachycardia, dizziness, syncope, nausea.  He would like to go home today.    Objective   Vital Signs  Temp:  [97.6 °F (36.4 °C)-98.5 °F (36.9 °C)] 97.6 °F (36.4 °C)  Heart Rate:  [69-91] 72  Resp:  [18-20] 18  BP: ()/(60-72) 119/72    Intake/Output Summary (Last 24 hours) at 2/13/2019 0918  Last data filed at 2/13/2019 0800  Gross per 24 hour   Intake 1200 ml   Output --   Net 1200 ml       Comfortable NAD  Neck supple, no JVD or thyromegaly appreciated  S1/S2 RRR, no m/r/g  Lungs CTA B, normal effort  Abdomen S/NT/ND (+) BS, no HSM appreciated  Extremities warm, no clubbing, cyanosis, or edema  No visible or palpable skin lesions  A/Ox4, mood and affect appropriate    Results Review:      Results from last 7 days   Lab Units 02/13/19  0327 02/12/19  0310 02/11/19  0555   SODIUM mmol/L 141 139 141   POTASSIUM mmol/L 3.7 3.9 3.9   CHLORIDE mmol/L 102 101 99   CO2 mmol/L 29.1* 26.1 25.8   BUN mg/dL 17 17 17   CREATININE mg/dL 0.99 0.97 1.02   GLUCOSE mg/dL 143* 201* 264*   CALCIUM mg/dL 8.8 8.8 9.1         Results from last 7 days   Lab Units 02/12/19  0310 02/11/19  0555 02/10/19  0349   WBC 10*3/mm3 9.26 11.63* 12.25*   HEMOGLOBIN g/dL 12.7* 14.7 13.6*   HEMATOCRIT % 39.5 44.0 43.3   PLATELETS 10*3/mm3 202 215 159     Results from last 7 days   Lab Units 02/09/19  0313 02/08/19  1434 02/07/19  0849   INR  1.15* 1.33* 0.95   APTT seconds  --  30.9 25.6     Results from last 7 days   Lab Units 02/07/19  0849   CHOLESTEROL mg/dL 132     Results from last 7 days   Lab Units 02/09/19  0313   MAGNESIUM mg/dL 2.5*     Results from last 7 days   Lab Units 02/07/19  0849   CHOLESTEROL mg/dL 132   TRIGLYCERIDES mg/dL 193*   HDL CHOL  mg/dL 37*   LDL CHOL mg/dL 56       I reviewed the patient's new clinical results.  I personally viewed and interpreted the patient's EKG/Telemetry data        Medication Review:     [START ON 2/14/2019] amiodarone 400 mg Oral Q24H   aspirin 81 mg Oral Daily   atorvastatin 40 mg Oral Nightly   DULoxetine 30 mg Oral Daily   DULoxetine 60 mg Oral Q PM   enoxaparin 40 mg Subcutaneous Q24H   furosemide 40 mg Oral Daily   glipiZIDE 2.5 mg Oral Daily With Dinner   insulin lispro 0-9 Units Subcutaneous 4x Daily With Meals & Nightly   levothyroxine 175 mcg Oral Daily   Liraglutide 1.8 mg Subcutaneous Daily   metFORMIN 1,000 mg Oral BID With Meals   metoprolol tartrate 50 mg Oral Q12H   multivitamin 1 tablet Oral Daily   mupirocin  Each Nare BID   potassium chloride 20 mEq Oral Daily   sennosides-docusate sodium 2 tablet Oral Nightly   traZODone 50 mg Oral Nightly            Assessment/Plan       Coronary artery disease of native heart with stable angina pectoris (CMS/[facility])    1. Multivessel coronary disease - s/p CABG x 3 (2/8/19). Lower extremity edema noted. Oral lasix added. On ASA, statin, beta blocker.   2. S/p aortobifemoral bypass 2008  3. HLD - on statin  4. Hypothyroidism - synthroid  5. DM - home metformin has been restarted.   6. Anxiety/depression - home meds have been restarted.   7. Lactose intolerance - dietary supervisor to see patient later today to remedy the situation.  8. A fib with RVR 2/12 - converted to NSR.  on amiodarone 400mg daily    Ok to go home today - f/u in office as previously scheduled.       Argentina Prieto MD  02/13/19  9:18 AM

## 2019-02-13 NOTE — DISCHARGE SUMMARY
Date of Admission:   Date of Discharge:  2/13/2019    Discharge Diagnosis:     -Severe CAD-s/p CABG x 3 with LIMA-LAD, right EVH of SVG-   -Hypertension  -Hyperlipidemia  -hypothyroidism  -hx of tobacco abuse  -PAD- s/p stents  -Diabetes type 2         Presenting Problem/History of Present Illness  Coronary artery disease of native heart with stable angina pectoris, unspecified vessel or lesion type (CMS/HCC) [I25.118]  Coronary artery disease of native heart with stable angina pectoris, unspecified vessel or lesion type (CMS/HCC) [I25.118]     Hospital Course  Patient is a 68 y.o. male presented on 2/8 for CABGx3 with LIMA-LAD, SVGs to OM1 and PDA, EVH of right leg by Dr. Crawford.  Post operatively he did well.  He had a brief episode of post operative atrial fibrillation which was converted to sinus rhythm.  He tolerated medication therapy. Adequate PO intake.  Urinating and defecating normally.  Met PT goals. On post op day number 5 he was deemed ready for discharge home with home health.  Sternal precautions reviewed.  Signs and symptoms of sternal instability and infection reviewed.  Patient will follow up with cardiology in 1 week and again in 4 weeks.  Patient is to follow up with PCP in 1 week.  Post op follow up with Elizabeth Sorto on 3/28.  He was instructed to call with any and all questions or concerns.      Procedures Performed  Procedure(s):  THANG STERNOTOMY CORONARY ARTERY BYPASS GRAFT TIMES 3 USING LEFT INTERNAL MAMMARY ARTERY AND LEFT GREATER SAPHENOUS VEIN GRAFT PER ENDOSCOPIC VEIN GRAFT AND PRP.       Consults:   Consults     Date and Time Order Name Status Description    2/8/2019 1433 Inpatient Cardiology Consult Completed           Pertinent Test Results:    Lab Results   Component Value Date    WBC 9.26 02/12/2019    HGB 12.7 (L) 02/12/2019    HCT 39.5 02/12/2019    MCV 86.2 02/12/2019     02/12/2019      Lab Results   Component Value Date    GLUCOSE 143 (H) 02/13/2019    CALCIUM 8.8  "02/13/2019     02/13/2019    K 3.7 02/13/2019    CO2 29.1 (H) 02/13/2019     02/13/2019    BUN 17 02/13/2019    CREATININE 0.99 02/13/2019    EGFRIFAFRI 76 01/08/2019    EGFRIFNONA 75 02/13/2019    BCR 17.2 02/13/2019    ANIONGAP 9.9 02/13/2019     Lab Results   Component Value Date    INR 1.15 (H) 02/09/2019    PROTIME 14.5 (H) 02/09/2019         Condition on Discharge:  stable    Vital Signs  Temp:  [97.6 °F (36.4 °C)-98.5 °F (36.9 °C)] 97.6 °F (36.4 °C)  Heart Rate:  [69-86] 72  Resp:  [18] 18  BP: (100-121)/(61-72) 119/72      Discharge Disposition  Home-Health Care c    Discharge Medications     Discharge Medications      New Medications      Instructions Start Date   amiodarone 200 MG tablet  Commonly known as:  PACERONE  Notes to patient:  2/14/2019   400 mg, Oral, Every 24 Hours Scheduled      furosemide 40 MG tablet  Commonly known as:  LASIX  Notes to patient:  2/14/2019   40 mg, Oral, Daily      HYDROcodone-acetaminophen 5-325 MG per tablet  Commonly known as:  NORCO   1 tablet, Oral, Every 4 Hours PRN      metoprolol tartrate 50 MG tablet  Commonly known as:  LOPRESSOR  Notes to patient:  2/13/2019  This medication will be due this evening   50 mg, Oral, Every 12 Hours Scheduled      potassium chloride 10 MEQ CR capsule  Commonly known as:  MICRO-K  Notes to patient:  2/14/2019   20 mEq, Oral, Daily         Continue These Medications      Instructions Start Date   ALPRAZolam 0.5 MG tablet  Commonly known as:  XANAX  Notes to patient:  Continue your regular schedule   0.25 mg, Oral, 2 Times Daily      aspirin 81 MG EC tablet  Notes to patient:  2/14/2019   81 mg, Oral, Daily, HOLDING PER MD      atorvastatin 40 MG tablet  Commonly known as:  LIPITOR  Notes to patient:  2/13/2019  You will need to take this medication this evening   40 mg, Oral, Daily      B-D SYRINGE/NEEDLE 3CC/22GX1.5 22G X 1-1/2\" 3 ML misc  Generic drug:  Syringe/Needle (Disp)   Does not apply      AISHA CONTOUR TEST test " strip  Generic drug:  glucose blood   Kimberly Contour Test In Vitro Strip; Patient Sig: Kimberly Contour Test In Vitro Strip ; 100; 0; 06-May-2012; Active      DULoxetine 30 MG capsule  Commonly known as:  CYMBALTA  Notes to patient:  2/14/2019   30 mg, Oral, Daily      DULoxetine 60 MG capsule  Commonly known as:  CYMBALTA  Notes to patient:  2/13/2019  This medication will be due this evening   60 mg, Oral, Every Evening      glimepiride 1 MG tablet  Commonly known as:  AMARYL  Notes to patient:  2/13/2019  You will need to take this medication this evening   1 mg, Oral, 2 Times Daily      levothyroxine 175 MCG tablet  Commonly known as:  SYNTHROID, LEVOTHROID  Notes to patient:  2/14/2019   175 mcg, Oral, Daily      metFORMIN 1000 MG tablet  Commonly known as:  GLUCOPHAGE  Notes to patient:  2/13/2019  This medication will be due this evening   1,000 mg, Oral, 2 Times Daily With Meals      MULTI-DAY VITAMINS PO  Notes to patient:  2/14/2019   1 tablet, Oral, Daily, HOLD PRIOR TO SURG      NOVOFINE 32G X 6 MM misc  Generic drug:  Insulin Pen Needle   Does not apply      PLAVIX 75 MG tablet  Generic drug:  clopidogrel  Notes to patient:  2/14/2019   75 mg, Oral, Daily, HOLDING PER MD      Testosterone Cypionate 200 MG/ML kit  Notes to patient:  Continue your regular routine   Intramuscular, Every 10 Days      traZODone 50 MG tablet  Commonly known as:  DESYREL  Notes to patient:  2/13/2019  This medication will be due this evening   50 mg, Oral, Nightly      VICTOZA 18 MG/3ML solution pen-injector injection  Generic drug:  Liraglutide  Notes to patient:  2/14/2019   1.8 mg, Subcutaneous, Daily      Vitamin D3 2000 units tablet  Notes to patient:  2/13/2018  You can take this medication when you get home   1 tablet, Oral, Daily         Stop These Medications    carvedilol 6.25 MG tablet  Commonly known as:  COREG     chlorhexidine 0.12 % solution  Commonly known as:  PERIDEX     CHLORHEXIDINE GLUCONATE CLOTH EX      lisinopril 5 MG tablet  Commonly known as:  PRINIVIL,ZESTRIL     mupirocin 2 % ointment  Commonly known as:  BACTROBAN            Discharge Diet:     Activity at Discharge:   Activity Instructions     Discharge Activity Restrictions      1) No driving for 2 weeks and no longer taking narcotics, ride in the back seat for 2 weeks.   2) May shower, no tub bathing, no immersion in pools or hot tubs   3) Do not lift / push / pull more then 10 lbs.  4) Walk at least 10 minutes at least 3 times daily          Follow-up Appointments  Future Appointments   Date Time Provider Department Center   3/28/2019  1:45 PM Elizabeth Sorto APRN [facility] None   4/10/2019 11:00 AM Michelle Wen APRN [facility] None   4/19/2019  9:30 AM [facility] None   5/2/2019  9:45 AM Curt Brown MD [facility] None     Additional Instructions for the Follow-ups that You Need to Schedule     Call MD With Problems / Concerns   As directed      Instructions:  Call office at [phone] for any drainage, increased redness, or fever over 100.5    Order Comments:  Instructions:  Call office at [phone] for any drainage, increased redness, or fever over 100.5          Discharge Follow-up with PCP   As directed       Currently Documented PCP:    Phil Gilliam MD    PCP Phone Number:    [phone]     Follow Up Details:  in 1 week         Discharge Follow-up with Specialty: Cardiologist ANETA/PA; 1 Week   As directed      Specialty:  Cardiologist ANETA/PA    Follow Up:  1 Week    Follow Up Details:  bring all prescription bottles to appointment, call for appointment         Discharge Follow-up with Specified Provider: Cardiologist; 1 Month   As directed      To:  Cardiologist    Follow Up:  1 Month    Follow Up Details:  call for appointment, bring all medication bottles to appointment         Discharge Follow-up with Specified Provider:    As directed      To:      Follow Up Details:  4-6 weeks,  bring all current medications to appointment         Referral to Home Health   As directed      Face to Face Visit Date:  2/13/2019    Follow-up Provider for Plan of Care?:  I will be treating the patient on an ongoing basis.  Please send me the Plan of Care for signature.    Follow-up Provider:  HELENE FRANCO [[id number]]    Reason/Clinical Findings:  post op open heart    Describe mobility limitations that make leaving home difficult:  weakness    Nursing/Therapeutic Services Requested:  Skilled Nursing    Skilled nursing orders:  Post CABG care    Frequency:  1 Week 1               Test Results Pending at Discharge   Order Current Status    POC OR Panel, ISTAT In process           ANETA Alves  02/13/19  1:50 PM

## 2019-02-13 NOTE — ANESTHESIA POSTPROCEDURE EVALUATION
"Patient: Spencer Sinha    Procedure Summary     Date:  02/08/19 Room / Location:  [facility] OR [address] [place] [address] MAIN OR    Anesthesia Start:  1105 Anesthesia Stop:  1430    Procedure:  THANG STERNOTOMY CORONARY ARTERY BYPASS GRAFT TIMES 3 USING LEFT INTERNAL MAMMARY ARTERY AND LEFT GREATER SAPHENOUS VEIN GRAFT PER ENDOSCOPIC VEIN GRAFT AND PRP. (N/A Chest) Diagnosis:       Coronary artery disease of native heart with stable angina pectoris, unspecified vessel or lesion type (CMS/HCC)      (Coronary artery disease of native heart with stable angina pectoris, unspecified vessel or lesion type (CMS/HCC) [I25.118])    Surgeon:  Nathaniel Crawford MD Provider:  Vishal Bliss MD    Anesthesia Type:  general ASA Status:  4          Anesthesia Type: general  Last vitals  BP   119/72 (02/13/19 0800)   Temp   36.4 °C (97.6 °F) (02/13/19 0800)   Pulse   72 (02/13/19 0800)   Resp   18 (02/13/19 0800)     SpO2   94 % (02/13/19 0800)     Post Anesthesia Care and Evaluation      Comments: Patient discharged before being evaluated by an Anesthesiologist. No apparent complications per the record.  This case was not medically directed. I am completing this chart for medical records purposes; I personally have no medical involvement with this patient.    /72 (BP Location: Left arm, Patient Position: Sitting)   Pulse 72   Temp 36.4 °C (97.6 °F) (Oral)   Resp 18   Ht 180.3 cm (71\")   Wt 90.3 kg (199 lb 2 oz)   SpO2 94%   BMI 27.77 kg/m²           "

## 2019-02-13 NOTE — PLAN OF CARE
Problem: Patient Care Overview  Goal: Plan of Care Review  Outcome: Ongoing (interventions implemented as appropriate)   02/13/19 0058   Coping/Psychosocial   Plan of Care Reviewed With patient   Plan of Care Review   Progress improving   OTHER   Outcome Summary VSS. SR. RA. No complaints of pain. Up ad kyle. Possible D/C today. Will continue to monitor.        Problem: Skin Injury Risk (Adult)  Goal: Skin Health and Integrity  Outcome: Ongoing (interventions implemented as appropriate)      Problem: Cardiac Surgery (Adult)  Goal: Signs and Symptoms of Listed Potential Problems Will be Absent, Minimized or Managed (Cardiac Surgery)  Outcome: Ongoing (interventions implemented as appropriate)      Problem: Fall Risk (Adult)  Goal: Absence of Fall  Outcome: Ongoing (interventions implemented as appropriate)

## 2019-02-14 ENCOUNTER — READMISSION MANAGEMENT (OUTPATIENT)
Dept: CALL CENTER | Facility: HOSPITAL | Age: 69
End: 2019-02-14

## 2019-02-14 NOTE — PROGRESS NOTES
Case Management Discharge Note    Final Note: Pt d/c home with [facility]  scheduled to follow.  gloria Sheffield aware of Pt d/c.      Destination      No service has been selected for the patient.      Durable Medical Equipment      No service has been selected for the patient.      Dialysis/Infusion      No service has been selected for the patient.      Home Medical Care - Selection Complete      Service Provider Request Status Selected Services Address Phone Number Fax Number    [facility] [address] [place] [address] [phone]      [facility]      No service has been selected for the patient.             Final Discharge Disposition Code: 06 - home with home health care

## 2019-02-15 ENCOUNTER — TELEPHONE (OUTPATIENT)
Dept: CARDIOLOGY | Facility: CLINIC | Age: 69
End: 2019-02-15

## 2019-02-15 NOTE — TELEPHONE ENCOUNTER
"02/15/19  2:20 PM  Spencer Sinha  1950    Home Phone [phone]   Mobile [phone]       Spencer Sinha is a patient of Dr Tate. Vanessa with home health calling in to report that the patient is hypotensive and dizzy.  Recent CABG by Dr Crawford.  No edema, no SOA.  Reports that he is drinking enough water.    bp 88/54 hr 55  Patient is not weighing every day. Asked them to start.    Cardiac meds reviewed with nurse  Amiodarone 200mg 2 tabs daily  Furosemide 40mg daily- Nurses says \"he is dry\"  Metoprolol tartrate 50mg BID  lipitor  ASA  Plavix    Does he need a med adjustment?    Jes Everett RN  Triage nurse      "

## 2019-02-15 NOTE — OUTREACH NOTE
Prep Survey      Responses   Facility patient discharged from?  [facility]   Is patient eligible?  Yes   Discharge diagnosis  Severe CAD-s/p CABG x 3 with LIMA-LAD, right EVH of SVG   Does the patient have one of the following disease processes/diagnoses(primary or secondary)?  Cardiothoracic surgery   Does the patient have Home health ordered?  Yes   What is the Home health agency?    [facility]    Is there a DME ordered?  No   Prep survey completed?  Yes          Stefanie Buckner RN

## 2019-02-16 ENCOUNTER — READMISSION MANAGEMENT (OUTPATIENT)
Dept: CALL CENTER | Facility: HOSPITAL | Age: 69
End: 2019-02-16

## 2019-02-16 NOTE — OUTREACH NOTE
CT Surgery Week 1 Survey      Responses   Facility patient discharged from?  [facility]   Does the patient have one of the following disease processes/diagnoses(primary or secondary)?  Cardiothoracic surgery   Is there a successful TCM telephone encounter documented?  No   Week 1 attempt successful?  No   Unsuccessful attempts  Attempt 1            Ruthie Tomas RN

## 2019-02-18 ENCOUNTER — READMISSION MANAGEMENT (OUTPATIENT)
Dept: CALL CENTER | Facility: HOSPITAL | Age: 69
End: 2019-02-18

## 2019-02-18 NOTE — OUTREACH NOTE
CT Surgery Week 1 Survey      Responses   Facility patient discharged from?  [facility]   Does the patient have one of the following disease processes/diagnoses(primary or secondary)?  Cardiothoracic surgery   Is there a successful TCM telephone encounter documented?  No   Week 1 attempt successful?  No   Unsuccessful attempts  Attempt 2            Leighann Horowitz, RN

## 2019-02-21 ENCOUNTER — READMISSION MANAGEMENT (OUTPATIENT)
Dept: CALL CENTER | Facility: HOSPITAL | Age: 69
End: 2019-02-21

## 2019-02-21 NOTE — OUTREACH NOTE
CT Surgery Week 2 Survey      Responses   Facility patient discharged from?  [facility]   Does the patient have one of the following disease processes/diagnoses(primary or secondary)?  Cardiothoracic surgery   Week 2 attempt successful?  Yes   Call start time  1445   Call end time  1453   Meds reviewed with patient/caregiver?  Yes   Is the patient taking all medications as directed (includes completed medication regime)?  Yes   Does the patient have a primary care provider?   Yes   Has the patient kept scheduled appointments due by today?  Yes   Nursing interventions  Reviewed instructions with patient   What is the patient's perception of their health status since discharge?  Improving   Is the patient /caregiver able to teach back the importance of cardiac rehab?  Yes   Nursing interventions  Provided education on importance of cardiac rehab   Additional teach back comments  Patient had questions about when he can start cardiac rehab, questions answered.   Week 2 call completed?  Yes          Leighann Horowitz RN

## 2019-02-24 ENCOUNTER — CLINICAL SUPPORT (OUTPATIENT)
Dept: CARDIAC SURGERY | Facility: CLINIC | Age: 69
End: 2019-02-24

## 2019-02-24 DIAGNOSIS — I10 ESSENTIAL (PRIMARY) HYPERTENSION: ICD-10-CM

## 2019-02-24 DIAGNOSIS — E11.51 TYPE 2 DIABETES MELLITUS WITH DIABETIC PERIPHERAL ANGIOPATHY WITHOUT GANGRENE, UNSPECIFIED WHETHER LONG TERM INSULIN USE (HCC): ICD-10-CM

## 2019-02-24 DIAGNOSIS — Z48.812 ENCOUNTER FOR SURGICAL AFTERCARE FOLLOWING SURGERY OF CIRCULATORY SYSTEM: Primary | ICD-10-CM

## 2019-02-24 DIAGNOSIS — I25.10 ATHEROSCLEROSIS OF NATIVE CORONARY ARTERY WITHOUT ANGINA PECTORIS, UNSPECIFIED WHETHER NATIVE OR TRANSPLANTED HEART: ICD-10-CM

## 2019-02-24 PROCEDURE — G0180 MD CERTIFICATION HHA PATIENT: HCPCS | Performed by: THORACIC SURGERY (CARDIOTHORACIC VASCULAR SURGERY)

## 2019-02-27 ENCOUNTER — TELEPHONE (OUTPATIENT)
Dept: CARDIOLOGY | Facility: CLINIC | Age: 69
End: 2019-02-27

## 2019-02-27 NOTE — TELEPHONE ENCOUNTER
02/27/19  12:30 PM  Spencer Sinha  1950    Home Phone [phone]   Mobile [phone]       Spencer Sinha is a [facility] saying the pt's BP dropped from lying to sitting as follows:    Lying  120/87 70's pulse  Sitting 100/66 70's pulse    Her last call on 2/15/19 d/t dizziness resulted in a decrease in Metoprolol from 50 to 25 mg BID    Current cardiac meds are:    Amiodarone 100mg daily  Furosemide 40mg daily  Metoprolol tartrate 25mg BID    Told her to make sure he is taking in 6-8 glasses of water per day.     Does he need a med adjustment?    Does he need to be seen?    Cat Stephen RN

## 2019-02-28 ENCOUNTER — READMISSION MANAGEMENT (OUTPATIENT)
Dept: CALL CENTER | Facility: HOSPITAL | Age: 69
End: 2019-02-28

## 2019-02-28 ENCOUNTER — TELEPHONE (OUTPATIENT)
Dept: CARDIOLOGY | Facility: CLINIC | Age: 69
End: 2019-02-28

## 2019-02-28 NOTE — OUTREACH NOTE
CT Surgery Week 3 Survey      Responses   Facility patient discharged from?  [facility]   Does the patient have one of the following disease processes/diagnoses(primary or secondary)?  Cardiothoracic surgery   Week 3 attempt successful?  No   Unsuccessful attempts  Attempt 1          Tyra Lua RN

## 2019-02-28 NOTE — TELEPHONE ENCOUNTER
Rachell from home health called and wants to know did you already put the order in for cardiac rehab. Pt had CABG X3 2/8/19.    Thanks  Sydney JOSE

## 2019-03-01 DIAGNOSIS — Z95.1 S/P CABG (CORONARY ARTERY BYPASS GRAFT): Primary | ICD-10-CM

## 2019-03-04 ENCOUNTER — READMISSION MANAGEMENT (OUTPATIENT)
Dept: CALL CENTER | Facility: HOSPITAL | Age: 69
End: 2019-03-04

## 2019-03-04 NOTE — OUTREACH NOTE
CT Surgery Week 3 Survey      Responses   Facility patient discharged from?  [facility]   Does the patient have one of the following disease processes/diagnoses(primary or secondary)?  Cardiothoracic surgery   Week 3 attempt successful?  No   Unsuccessful attempts  Attempt 2          Miguel Angel Palma RN

## 2019-03-12 DIAGNOSIS — IMO0002 UNCONTROLLED TYPE 2 DIABETES MELLITUS WITH COMPLICATION, WITHOUT LONG-TERM CURRENT USE OF INSULIN: ICD-10-CM

## 2019-03-13 RX ORDER — LIRAGLUTIDE 6 MG/ML
INJECTION SUBCUTANEOUS
Qty: 9 ML | Refills: 3 | Status: SHIPPED | OUTPATIENT
Start: 2019-03-13 | End: 2019-07-22 | Stop reason: SDUPTHER

## 2019-03-13 RX ORDER — GLIMEPIRIDE 2 MG/1
TABLET ORAL
Qty: 90 TABLET | Refills: 0 | Status: SHIPPED | OUTPATIENT
Start: 2019-03-13 | End: 2019-06-07 | Stop reason: SDUPTHER

## 2019-03-14 NOTE — TELEPHONE ENCOUNTER
----- Message from Argentina Prieto MD sent at 3/14/2017  1:11 PM EDT -----  Contact: patient  Ok to fill.       ----- Message -----     From: Emma Francisco MA     Sent: 3/14/2017   9:55 AM       To: Argentina Prieto MD     PATIENT NEEDS REFILL OF DILTIAZEM  MG 24HR CAPSULE 90 DAY SUPPLY TO [facility] LISTED IN CHART.  ----- Message -----     From: Monica Dale     Sent: 3/14/2017   9:40 AM       To: Emma Francisco MA    Patient needs a refill of  fenofibric acid (TRILIPIX) 135 MG capsule, one tablet once per day, 90 day supply  diltiaZEM CD (CARDIZEM CD) 180 MG 24 hr capsule, one tablet once per day, 90 day supply  Send to [facility]  [phone] (Phone)  [phone] (Fax)        SCRIPT SENT   Generator inserted into pocket in the left upper chest.

## 2019-03-15 ENCOUNTER — TREATMENT (OUTPATIENT)
Dept: CARDIAC REHAB | Facility: HOSPITAL | Age: 69
End: 2019-03-15

## 2019-03-15 DIAGNOSIS — Z95.1 STATUS POST CORONARY ARTERY BYPASS GRAFTING: Primary | ICD-10-CM

## 2019-03-15 PROCEDURE — 93798 PHYS/QHP OP CAR RHAB W/ECG: CPT

## 2019-03-18 ENCOUNTER — TREATMENT (OUTPATIENT)
Dept: CARDIAC REHAB | Facility: HOSPITAL | Age: 69
End: 2019-03-18

## 2019-03-18 DIAGNOSIS — Z95.1 STATUS POST CORONARY ARTERY BYPASS GRAFTING: Primary | ICD-10-CM

## 2019-03-18 PROCEDURE — 93798 PHYS/QHP OP CAR RHAB W/ECG: CPT

## 2019-03-20 ENCOUNTER — TREATMENT (OUTPATIENT)
Dept: CARDIAC REHAB | Facility: HOSPITAL | Age: 69
End: 2019-03-20

## 2019-03-20 DIAGNOSIS — Z95.1 STATUS POST CORONARY ARTERY BYPASS GRAFTING: Primary | ICD-10-CM

## 2019-03-20 PROCEDURE — 93798 PHYS/QHP OP CAR RHAB W/ECG: CPT

## 2019-03-22 ENCOUNTER — TREATMENT (OUTPATIENT)
Dept: CARDIAC REHAB | Facility: HOSPITAL | Age: 69
End: 2019-03-22

## 2019-03-22 DIAGNOSIS — Z95.1 STATUS POST CORONARY ARTERY BYPASS GRAFTING: Primary | ICD-10-CM

## 2019-03-22 PROCEDURE — 93798 PHYS/QHP OP CAR RHAB W/ECG: CPT

## 2019-03-25 ENCOUNTER — TREATMENT (OUTPATIENT)
Dept: CARDIAC REHAB | Facility: HOSPITAL | Age: 69
End: 2019-03-25

## 2019-03-25 DIAGNOSIS — Z95.1 STATUS POST CORONARY ARTERY BYPASS GRAFTING: Primary | ICD-10-CM

## 2019-03-25 PROCEDURE — 93798 PHYS/QHP OP CAR RHAB W/ECG: CPT

## 2019-03-27 ENCOUNTER — TREATMENT (OUTPATIENT)
Dept: CARDIAC REHAB | Facility: HOSPITAL | Age: 69
End: 2019-03-27

## 2019-03-27 DIAGNOSIS — Z95.1 STATUS POST CORONARY ARTERY BYPASS GRAFTING: Primary | ICD-10-CM

## 2019-03-27 PROCEDURE — 93798 PHYS/QHP OP CAR RHAB W/ECG: CPT

## 2019-03-28 ENCOUNTER — OFFICE VISIT (OUTPATIENT)
Dept: CARDIAC SURGERY | Facility: CLINIC | Age: 69
End: 2019-03-28

## 2019-03-28 VITALS
DIASTOLIC BLOOD PRESSURE: 83 MMHG | SYSTOLIC BLOOD PRESSURE: 130 MMHG | TEMPERATURE: 97.4 F | RESPIRATION RATE: 20 BRPM | OXYGEN SATURATION: 96 % | HEART RATE: 78 BPM | HEIGHT: 73 IN | WEIGHT: 200 LBS | BODY MASS INDEX: 26.51 KG/M2

## 2019-03-28 DIAGNOSIS — Z95.1 S/P CABG X 3: Primary | ICD-10-CM

## 2019-03-28 PROCEDURE — 99024 POSTOP FOLLOW-UP VISIT: CPT | Performed by: NURSE PRACTITIONER

## 2019-03-29 ENCOUNTER — TREATMENT (OUTPATIENT)
Dept: CARDIAC REHAB | Facility: HOSPITAL | Age: 69
End: 2019-03-29

## 2019-03-29 DIAGNOSIS — Z95.1 STATUS POST CORONARY ARTERY BYPASS GRAFTING: Primary | ICD-10-CM

## 2019-03-29 PROCEDURE — 82962 GLUCOSE BLOOD TEST: CPT

## 2019-03-29 PROCEDURE — 93798 PHYS/QHP OP CAR RHAB W/ECG: CPT

## 2019-03-29 NOTE — PROGRESS NOTES
"CARDIOVASCULAR SURGERY FOLLOW-UP PROGRESS NOTE  Chief Complaint: Postop follow-up        HPI:   Dear Phil Schreiber MD and colleagues:    It was nice to see Spencer Sinha in follow up 6 weeks after surgery.  As you know, he is a 69 y.o. male with CAD who underwent CABG x3 on 2/8/2019 with Dr. Crawford. He did well postoperatively and continues to do well. He comes in today complaining of nothing.  His activity level has been good, he has been going to cardiac rehab for 2 weeks.  From a surgical standpoint, the sternal incision is well approximated without erythema, edema, or drainage.  The sternum is stable to palpation, and the patient denies any popping or clicking with deep inspiration or coughing.  There was a stitch that was working its way out of his left leg incision site, I removed the stitch, there is a small amount of erythema at the incision line, but no edema or drainage.    Physical Exam:         /83   Pulse 78   Temp 97.4 °F (36.3 °C)   Resp 20   Ht 185.4 cm (73\")   Wt 90.7 kg (200 lb)   SpO2 96%   BMI 26.39 kg/m²   Heart:  regular rate and rhythm, S1, S2 normal, no murmur, click, rub or gallop  Lungs:  clear to auscultation bilaterally  Extremities:  no edema  Incision(s):  mid chest healing well, left leg healing well, sternum stable    Assessment/Plan:     S/P CABG. Overall, he is doing well.    No significant post-op complications    No heavy lifting > 50 pounds for 6 more weeks  Keep incisions clean and dry  OK to drive if not taking narcotic pain medicine  OK to begin cardiac rehab  Follow-up as scheduled with cardiology  Follow-up with CT surgery prn    No restrictions of activity.    Return to clinic if any signs or symptoms of infection or sternal instability develop       Thank you for allowing me to participate in the care of your patient.    Regards,  ANETA Sanders      "

## 2019-04-01 ENCOUNTER — TREATMENT (OUTPATIENT)
Dept: CARDIAC REHAB | Facility: HOSPITAL | Age: 69
End: 2019-04-01

## 2019-04-01 DIAGNOSIS — Z95.1 STATUS POST CORONARY ARTERY BYPASS GRAFTING: Primary | ICD-10-CM

## 2019-04-01 LAB — GLUCOSE BLDC GLUCOMTR-MCNC: 221 MG/DL (ref 70–130)

## 2019-04-01 PROCEDURE — 93798 PHYS/QHP OP CAR RHAB W/ECG: CPT

## 2019-04-03 ENCOUNTER — TREATMENT (OUTPATIENT)
Dept: CARDIAC REHAB | Facility: HOSPITAL | Age: 69
End: 2019-04-03

## 2019-04-03 DIAGNOSIS — Z95.1 STATUS POST CORONARY ARTERY BYPASS GRAFTING: Primary | ICD-10-CM

## 2019-04-03 PROCEDURE — 93798 PHYS/QHP OP CAR RHAB W/ECG: CPT

## 2019-04-05 ENCOUNTER — TREATMENT (OUTPATIENT)
Dept: CARDIAC REHAB | Facility: HOSPITAL | Age: 69
End: 2019-04-05

## 2019-04-05 DIAGNOSIS — Z95.1 STATUS POST CORONARY ARTERY BYPASS GRAFTING: Primary | ICD-10-CM

## 2019-04-05 PROCEDURE — 93798 PHYS/QHP OP CAR RHAB W/ECG: CPT

## 2019-04-08 ENCOUNTER — TREATMENT (OUTPATIENT)
Dept: CARDIAC REHAB | Facility: HOSPITAL | Age: 69
End: 2019-04-08

## 2019-04-08 DIAGNOSIS — Z95.1 STATUS POST CORONARY ARTERY BYPASS GRAFTING: Primary | ICD-10-CM

## 2019-04-08 PROCEDURE — 93798 PHYS/QHP OP CAR RHAB W/ECG: CPT

## 2019-04-10 ENCOUNTER — TREATMENT (OUTPATIENT)
Dept: CARDIAC REHAB | Facility: HOSPITAL | Age: 69
End: 2019-04-10

## 2019-04-10 ENCOUNTER — OFFICE VISIT (OUTPATIENT)
Dept: CARDIOLOGY | Facility: CLINIC | Age: 69
End: 2019-04-10

## 2019-04-10 VITALS
BODY MASS INDEX: 25.9 KG/M2 | SYSTOLIC BLOOD PRESSURE: 120 MMHG | HEART RATE: 71 BPM | WEIGHT: 195.4 LBS | DIASTOLIC BLOOD PRESSURE: 68 MMHG | HEIGHT: 73 IN

## 2019-04-10 DIAGNOSIS — E78.5 HYPERLIPIDEMIA, UNSPECIFIED HYPERLIPIDEMIA TYPE: ICD-10-CM

## 2019-04-10 DIAGNOSIS — T14.8XXA BRUISING: ICD-10-CM

## 2019-04-10 DIAGNOSIS — I10 ESSENTIAL HYPERTENSION: ICD-10-CM

## 2019-04-10 DIAGNOSIS — R42 DIZZINESS: ICD-10-CM

## 2019-04-10 DIAGNOSIS — Z95.1 S/P CABG X 3: ICD-10-CM

## 2019-04-10 DIAGNOSIS — I25.10 CHRONIC CORONARY ARTERY DISEASE: Primary | ICD-10-CM

## 2019-04-10 DIAGNOSIS — I48.0 PAF (PAROXYSMAL ATRIAL FIBRILLATION) (HCC): ICD-10-CM

## 2019-04-10 DIAGNOSIS — E11.8 TYPE 2 DIABETES MELLITUS WITH COMPLICATION, WITHOUT LONG-TERM CURRENT USE OF INSULIN (HCC): ICD-10-CM

## 2019-04-10 DIAGNOSIS — Z01.810 ENCOUNTER FOR PRE-OPERATIVE CARDIOVASCULAR CLEARANCE: ICD-10-CM

## 2019-04-10 DIAGNOSIS — Z95.1 STATUS POST CORONARY ARTERY BYPASS GRAFTING: Primary | ICD-10-CM

## 2019-04-10 PROBLEM — I20.9 ANGINA PECTORIS (HCC): Status: RESOLVED | Noted: 2019-01-30 | Resolved: 2019-04-10

## 2019-04-10 PROBLEM — R94.39 ABNORMAL NUCLEAR STRESS TEST: Status: RESOLVED | Noted: 2019-01-30 | Resolved: 2019-04-10

## 2019-04-10 PROBLEM — I25.118 CORONARY ARTERY DISEASE OF NATIVE HEART WITH STABLE ANGINA PECTORIS (HCC): Status: RESOLVED | Noted: 2019-02-01 | Resolved: 2019-04-10

## 2019-04-10 PROCEDURE — 93000 ELECTROCARDIOGRAM COMPLETE: CPT | Performed by: NURSE PRACTITIONER

## 2019-04-10 PROCEDURE — 93798 PHYS/QHP OP CAR RHAB W/ECG: CPT

## 2019-04-10 PROCEDURE — 99214 OFFICE O/P EST MOD 30 MIN: CPT | Performed by: NURSE PRACTITIONER

## 2019-04-10 NOTE — PROGRESS NOTES
Date of Office Visit: 04/10/2019  Encounter Provider: ANETA Marquez  Place of Service: [facility] CARDIOLOGY  Patient Name: Spencer Sinha  :1950      Chief Complaint   Patient presents with   • Follow-up     Hospital    :     Dear Dr. Phil Gilliam,     HPI: Spencer Sinha is a pleasant 69 y.o. male who presents today for a hospital follow up visit. He is a new patient to me and his previous records have been reviewed.      He has been diagnosed with type 2 diabetes mellitus, Graves' disease, hypertension, and hyperlipidemia.  He has a known history of atrial fibrillation due to thyroid toxicosis.  When his thyroid was treated his atrial fibrillation resolved.  In , he had an abnormal nuclear stress test which showed anterior jeffry-infarct ischemia.  Cardiac catheterization showed severe LAD disease with good collateralization and medical treatment was recommended at that time.  He has a known history of PAD and status post aortobifemoral bypass by Dr. moreno in .  He was a previous cigarette smoker and quit in .    He had a CT scan recently which showed extensive coronary artery calcification and he was experiencing mild exertional dyspnea.  A nuclear stress test was obtained which showed a medium sized area of moderate to severe ischemia in the inferior apical distribution.  He underwent cardiac catheterization 19 which showed the following: Normal left main, occluded LAD with left to left collaterals, 50% proximal and mid circumflex, 90% ostial RCA, 90% proximal and distal RCA.  On 19 he underwent coronary artery bypass graft surgery x3 with LIMA to LAD, SVG to OM, and SVG to PDA by Dr. Crawford.  Dr. Argentina Prieto consulted on him during that hospitalization.  He had a carotid duplex 19 which showed right and left internal carotid artery plaque bilaterally with no significant stenosis.  He had a brief episode of postoperative atrial  fibrillation and converted to normal sinus rhythm.  He was placed on a short course of amiodarone which is now been discontinued.    He presents today for hospital follow-up.  He is currently participating in cardiac rehab and will graduate after 5 weeks of rehab next week.  He informs me that he was diagnosed with a cyst on his lung and will be seen Dr. Grajeda's next week for surgical evaluation.  He says he really wants the cyst removed. He is concerned that since his hospitalization he has had a right arm yellow bruise that has not gone away.  He has also noticed some bruising of his left leg since the bypass surgery.  He has had some mild dizziness with positional changes.  He denies chest pain, shortness of air, PND, orthopnea, cough, edema, bleeding, palpitations, or syncope.  His blood pressure and heart rate are both normal today.      Past Medical History:   Diagnosis Date   • Atrial fibrillation (CMS/[facility])    • CAD (coronary artery disease)     S/p CABG on 02/8/19   • Chronic back pain     Hx Surgery   • Chronic knee pain     S/p Reg Total Knee Replacement   • Depression     Trazadone   • Diplopia    • Discoid lupus erythematosus    • DM (diabetes mellitus) (CMS/[facility])     T2   • Graves disease     Synthroid-175MG   • Hyperlipidemia     Controlled w/Meds   • Hypertension     Controlled w/Meds   • Hypogonadism, male    • Hypothyroidism, postablative    • Left ventricular hypertrophy    • Long-term insulin use (CMS/[facility])    • Lung mass     R Lower Lung   • Metabolic disorder    • NSTEMI (non-ST elevated myocardial infarction) (CMS/[facility])    • Osteoarthritis    • Peripheral vascular disease (CMS/[facility])    • Right bundle branch block with left anterior fascicular block    • Syncope Hx   • Thyrotoxicosis factitia        Past Surgical History:   Procedure Laterality Date   • ABDOMINAL AORTA STENT Bilateral     Using Vein-Aortofemoral   • BACK SURGERY     • CARDIAC CATHETERIZATION N/A 1/31/2019    Procedure: Coronary  angiography;  Surgeon: Hardik Silverman MD;  Location: [facility] CATH INVASIVE LOCATION;  Service: Cardiovascular   • CARDIAC CATHETERIZATION N/A 1/31/2019    Procedure: Left Heart Cath;  Surgeon: Hardik Silverman MD;  Location: [facility] CATH INVASIVE LOCATION;  Service: Cardiovascular   • CARDIAC CATHETERIZATION N/A 1/31/2019    Procedure: Left ventriculography;  Surgeon: Hardik Silverman MD;  Location: [facility] CATH INVASIVE LOCATION;  Service: Cardiovascular   • CATARACT EXTRACTION  2018   • COLONOSCOPY     • CORONARY ARTERY BYPASS GRAFT N/A 2/8/2019    Procedure: THANG STERNOTOMY CORONARY ARTERY BYPASS GRAFT TIMES 3 USING LEFT INTERNAL MAMMARY ARTERY AND LEFT GREATER SAPHENOUS VEIN GRAFT PER ENDOSCOPIC VEIN GRAFT AND PRP.;  Surgeon: Nathaniel Crawford MD;  Location: [facility] OR;  Service: Cardiothoracic   • LUNG BIOPSY     • OTHER SURGICAL HISTORY      Catheter Ablation   • REPLACEMENT TOTAL KNEE BILATERAL      Knee Joint   • TONSILLECTOMY AND ADENOIDECTOMY         Social History     Socioeconomic History   • Marital status:      Spouse name: Not on file   • Number of children: Not on file   • Years of education: Not on file   • Highest education level: Not on file   Occupational History   • Occupation: RETIRED   Tobacco Use   • Smoking status: Former Smoker     Types: Cigarettes   • Smokeless tobacco: Never Used   Substance and Sexual Activity   • Alcohol use: No     Comment: Caffeine use   • Drug use: No   • Sexual activity: Defer       Family History   Problem Relation Age of Onset   • Coronary artery disease Other    • Malig Hyperthermia Neg Hx        The following portion of the patient's history were reviewed and updated as appropriate: past medical history, past surgical history, past social history, past family history, allergies, current medications, and problem list.    Review of Systems   Constitution: Negative for chills, diaphoresis, fever, malaise/fatigue, night sweats, weight gain and weight loss.    HENT: Negative for hearing loss, nosebleeds, sore throat and tinnitus.    Eyes: Negative for blurred vision, double vision, pain and visual disturbance.   Cardiovascular: Negative for chest pain, claudication, cyanosis, dyspnea on exertion, irregular heartbeat, leg swelling, near-syncope, orthopnea, palpitations, paroxysmal nocturnal dyspnea and syncope.   Respiratory: Negative for cough, hemoptysis, shortness of breath, snoring and wheezing.    Endocrine: Negative for cold intolerance, heat intolerance and polyuria.   Hematologic/Lymphatic: Negative for bleeding problem. Bruises/bleeds easily.   Skin: Negative for color change, dry skin, flushing and itching.   Musculoskeletal: Negative for falls, joint pain, joint swelling, muscle cramps, muscle weakness and myalgias.   Gastrointestinal: Negative for abdominal pain, constipation, heartburn, melena, nausea and vomiting.   Genitourinary: Negative for dysuria and hematuria.   Neurological: Positive for dizziness. Negative for excessive daytime sleepiness, light-headedness, loss of balance, numbness, paresthesias, seizures and vertigo.   Psychiatric/Behavioral: Positive for depression. Negative for altered mental status, memory loss and substance abuse. The patient is nervous/anxious. The patient does not have insomnia.    Allergic/Immunologic: Negative for environmental allergies.       Allergies   Allergen Reactions   • Lactose Intolerance (Gi) GI Intolerance         Current Outpatient Medications:   •  ALPRAZolam (XANAX) 0.5 MG tablet, Take 0.25 mg by mouth 2 (Two) Times a Day., Disp: , Rfl:   •  aspirin 81 MG EC tablet, Take 81 mg by mouth Daily. HOLDING PER MD, Disp: , Rfl:   •  atorvastatin (LIPITOR) 40 MG tablet, Take 1 tablet by mouth Daily., Disp: 90 tablet, Rfl: 2  •  Cholecalciferol (VITAMIN D3) 2000 units tablet, Take 1 tablet by mouth Daily., Disp: , Rfl:   •  clopidogrel (PLAVIX) 75 MG tablet, Take 75 mg by mouth Daily. HOLDING PER MD, Disp: , Rfl:   •  " DULoxetine (CYMBALTA) 30 MG capsule, Take 30 mg by mouth Daily., Disp: , Rfl:   •  DULoxetine (CYMBALTA) 60 MG capsule, Take 60 mg by mouth Every Evening., Disp: , Rfl:   •  glimepiride (AMARYL) 2 MG tablet, TAKE ONE TABLET BY MOUTH DAILY AS DIRECTED (Patient taking differently: TAKE ONE 0.5MG TABLET BY MOUTH DAILY AS DIRECTED), Disp: 90 tablet, Rfl: 0  •  glucose blood (KIMBERLY CONTOUR TEST) test strip, Kimberly Contour Test In Vitro Strip; Patient Sig: Kimberly Contour Test In Vitro Strip ; 100; 0; 06-May-2012; Active, Disp: , Rfl:   •  Insulin Pen Needle (NOVOFINE) 32G X 6 MM misc, , Disp: , Rfl:   •  levothyroxine (SYNTHROID, LEVOTHROID) 175 MCG tablet, Take 175 mcg by mouth Daily., Disp: , Rfl:   •  metFORMIN (GLUCOPHAGE) 1000 MG tablet, TAKE ONE TABLET BY MOUTH TWICE A DAY, Disp: 180 tablet, Rfl: 0  •  metoprolol tartrate (LOPRESSOR) 50 MG tablet, Take 1 tablet by mouth Every 12 (Twelve) Hours for 90 days., Disp: 60 tablet, Rfl: 2  •  Multiple Vitamin (MULTI-DAY VITAMINS PO), Take 1 tablet by mouth Daily. HOLD PRIOR TO SURG, Disp: , Rfl:   •  Syringe/Needle, Disp, (B-D SYRINGE/NEEDLE 3CC/22GX1.5) 22G X 1-1/2\" 3 ML misc, , Disp: , Rfl:   •  Testosterone Cypionate 200 MG/ML kit, Inject  into the appropriate muscle as directed by prescriber Every 10 (Ten) Days., Disp: , Rfl:   •  traZODone (DESYREL) 50 MG tablet, Take 50 mg by mouth Every Night., Disp: , Rfl:   •  VICTOZA 18 MG/3ML solution pen-injector injection, DIAL AND INJECT SUBCUTANEOUSLY 1.8MG DAILY, Disp: 9 mL, Rfl: 3  •  Liraglutide (VICTOZA) 18 MG/3ML solution pen-injector injection, Inject 1.8 mg under the skin into the appropriate area as directed Daily., Disp: , Rfl:           Objective:     Vitals:    04/10/19 1053   BP: 120/68   BP Location: Left arm   Pulse: 71   Weight: 88.6 kg (195 lb 6.4 oz)   Height: 185.4 cm (73\")     Body mass index is 25.78 kg/m².    PHYSICAL EXAM:    Vitals Reviewed.   General Appearance: No acute distress, well developed and " well nourished.   Eyes: Conjunctiva and lids: No erythema, swelling, or discharge. Sclera non-icteric.   HENT: Atraumatic, normocephalic. External eyes, ears, and nose normal. No hearing loss noted. Mucous membranes normal. Lips not cyanotic. Neck supple with no tenderness.  Respiratory: No signs of respiratory distress. Respiration rhythm and depth normal.   Clear to auscultation. No rales, crackles, rhonchi, or wheezing auscultated.   Cardiovascular:  Jugular Venous Pressure: Normal  Heart Rate and Rhythm: Normal, Heart Sounds: Normal S1 and S2. No S3 or S4 noted.  Murmurs: No murmurs noted. No rubs, thrills, or gallops.   Chest incision well-healed.  Arterial Pulses: Carotid pulses normal. No carotid bruit noted. Posterior tibialis and dorsalis pedis pulses normal.   Lower Extremities: No edema noted.  Gastrointestinal:  Abdomen soft, non-distended, non-tender. Normal bowel sounds. No hepatomegaly.   Musculoskeletal: Normal movement of extremities  Skin and Nails: General appearance normal. No pallor, cyanosis, diaphoresis. Skin temperature normal. No clubbing of fingernails.  Yellow bruise on right upper arm.  Psychiatric: Patient alert and oriented to person, place, and time. Speech and behavior appropriate. Normal mood and affect.       ECG 12 Lead  Date/Time: 4/10/2019 10:52 AM  Performed by: Michelle Wen APRN  Authorized by: Michelle Wen APRN   Comparison: compared with previous ECG from 2/12/2019  Comparison to previous ECG: Atrial fibrillation, heart rate 135  Rhythm: sinus rhythm  Rate: normal  BPM: 1  Q waves: V1, V2, V3, V4, V5 and V6    ST Segments: ST segments normal  T flattening: aVL and V6  QRS axis: normal  Other findings: left ventricular hypertrophy and poor R wave progression    Clinical impression: abnormal EKG              Assessment:       Diagnosis Plan   1. Chronic coronary artery disease  ECG 12 Lead   2. S/P CABG x 3  ECG 12 Lead   3. Essential hypertension     4.  Hyperlipidemia, unspecified hyperlipidemia type     5. Type 2 diabetes mellitus with complication, without long-term current use of insulin (CMS/[facility])     6. Dizziness     7. Bruising     8. Encounter for pre-operative cardiovascular clearance     9. PAF (paroxysmal atrial fibrillation) (CMS/[facility])            Plan:       1/2.  Coronary Artery Disease/CABG: He status post CABG x3 and doing well postoperatively.  He will complete cardiac rehab next week.  He denies any further shortness of breath.  He will continue with his current medication regimen for secondary prevention.    Coronary Artery Disease  Assessment  • The patient has no angina    Plan  • Lifestyle modifications discussed include adhering to a heart healthy diet, maintenance of a healthy weight, medication compliance, regular exercise and regular monitoring of cholesterol and blood pressure    Subjective - Objective  • There is a history of previous coronary artery bypass graft  • Current antiplatelet therapy includes aspirin 81 mg and clopidogrel 75 mg      3.  Hypertension: Blood pressure is well controlled today.    4.  Hyperlipidemia: He remains on atorvastatin with LDL goal less than 70.    5.  Type II Diabetes Mellitus: We appreciate his PCP continuing to follow.    6.  Dizziness: He has had some mild dizziness with positional changes I recommended that he make slow position changes, sit on the edge of the bed/chair before getting up, and to keep himself well-hydrated with water.    7.  Bruising: He has a small yellow bruise on the right upper arm which he said is been there over the past month and he will continue to monitor.  He is noticed some bruising of his left leg which I did not appreciate on examination.    8.  Perioperative Cardiac Risk Assessment: He informs me that he has a cyst on his lung and will be seeing Dr. Silva next week for surgical evaluation.  I discussed with Dr. Prieto and she said from a cardiac standpoint he is  acceptable risk to proceed with surgery.  He will certainly need to check with his cardiothoracic surgeon, Dr. Crawford for his opinion as well.    9.  Postoperative Atrial Fibrillation: He has not had any documented recurrence of atrial fibrillation and is no longer taking the amiodarone.    Atrial Fibrillation and Atrial Flutter  Assessment  • The patient has paroxysmal atrial fibrillation  • The patient's CHADS2-VASc score is 4  • A [id number] score of 2 or more is considered a high risk for a thromboembolic event  • Aspirin prescribed    Plan  • Attempt to maintain sinus rhythm  • Continue aspirin for antithrombotic therapy, bleeding issues discussed  • Continue beta blocker for rate control    Subjective - Objective  • The average duration of atrial fibrillation episodes is <48 hours      10.  I recommend follow-up with Dr. Argentina Prieto in 3-4 months, unless otherwise needed sooner.    As always, it has been a pleasure to participate in your patient's care. Thank you.       Sincerely,         ANETA Liu        **Ummon Disclaimer:**  Much of this encounter note is an electronic transcription/translation of spoken language to printed text. The electronic translation of spoken language may permit erroneous, or at times, nonsensical words or phrases to be inadvertently transcribed. Although I have reviewed the note for such errors, some may still exist.

## 2019-04-12 ENCOUNTER — TREATMENT (OUTPATIENT)
Dept: CARDIAC REHAB | Facility: HOSPITAL | Age: 69
End: 2019-04-12

## 2019-04-12 DIAGNOSIS — Z95.1 STATUS POST CORONARY ARTERY BYPASS GRAFTING: Primary | ICD-10-CM

## 2019-04-12 PROCEDURE — 93798 PHYS/QHP OP CAR RHAB W/ECG: CPT

## 2019-04-15 ENCOUNTER — TREATMENT (OUTPATIENT)
Dept: CARDIAC REHAB | Facility: HOSPITAL | Age: 69
End: 2019-04-15

## 2019-04-15 DIAGNOSIS — Z95.1 STATUS POST CORONARY ARTERY BYPASS GRAFTING: Primary | ICD-10-CM

## 2019-04-15 PROCEDURE — 93798 PHYS/QHP OP CAR RHAB W/ECG: CPT

## 2019-04-17 ENCOUNTER — TREATMENT (OUTPATIENT)
Dept: CARDIAC REHAB | Facility: HOSPITAL | Age: 69
End: 2019-04-17

## 2019-04-17 ENCOUNTER — TELEPHONE (OUTPATIENT)
Dept: CARDIOLOGY | Facility: CLINIC | Age: 69
End: 2019-04-17

## 2019-04-17 DIAGNOSIS — Z95.1 STATUS POST CORONARY ARTERY BYPASS GRAFTING: Primary | ICD-10-CM

## 2019-04-17 PROCEDURE — 93798 PHYS/QHP OP CAR RHAB W/ECG: CPT

## 2019-04-17 NOTE — TELEPHONE ENCOUNTER
Dr. Silva office called stating that pt is needing to have RIGHT VIDEO ASSISTED THORACOSCOPY WITH WEDGE RESECTION OF LUNG MASS, POSSIBLE LOBECTOMY, POSSIBLE THORACOTOMY.    Dr. Silva is in epic    Pt is needing cardiac clearance    Pt was last seen by TK on 4/10/19

## 2019-04-19 ENCOUNTER — LAB (OUTPATIENT)
Dept: ENDOCRINOLOGY | Age: 69
End: 2019-04-19

## 2019-04-19 ENCOUNTER — TREATMENT (OUTPATIENT)
Dept: CARDIAC REHAB | Facility: HOSPITAL | Age: 69
End: 2019-04-19

## 2019-04-19 DIAGNOSIS — E29.1 HYPOGONADISM IN MALE: ICD-10-CM

## 2019-04-19 DIAGNOSIS — E89.0 POSTABLATIVE HYPOTHYROIDISM: ICD-10-CM

## 2019-04-19 DIAGNOSIS — IMO0002 UNCONTROLLED TYPE 2 DIABETES MELLITUS WITH COMPLICATION, WITHOUT LONG-TERM CURRENT USE OF INSULIN: ICD-10-CM

## 2019-04-19 DIAGNOSIS — Z95.1 STATUS POST CORONARY ARTERY BYPASS GRAFTING: Primary | ICD-10-CM

## 2019-04-19 DIAGNOSIS — IMO0002 UNCONTROLLED TYPE 2 DIABETES MELLITUS WITH COMPLICATION, WITHOUT LONG-TERM CURRENT USE OF INSULIN: Primary | ICD-10-CM

## 2019-04-19 PROCEDURE — 93797 PHYS/QHP OP CAR RHAB WO ECG: CPT

## 2019-04-21 LAB
ALBUMIN SERPL-MCNC: 4.6 G/DL (ref 3.5–5.2)
ALBUMIN/CREAT UR: 8.6 MG/G CREAT (ref 0–30)
ALBUMIN/GLOB SERPL: 1.8 G/DL
ALP SERPL-CCNC: 80 U/L (ref 39–117)
ALT SERPL-CCNC: 27 U/L (ref 1–41)
AST SERPL-CCNC: 18 U/L (ref 1–40)
BILIRUB SERPL-MCNC: 0.5 MG/DL (ref 0.2–1.2)
BUN SERPL-MCNC: 17 MG/DL (ref 8–23)
BUN/CREAT SERPL: 13.9 (ref 7–25)
CALCIUM SERPL-MCNC: 10 MG/DL (ref 8.6–10.5)
CHLORIDE SERPL-SCNC: 103 MMOL/L (ref 98–107)
CO2 SERPL-SCNC: 26.1 MMOL/L (ref 22–29)
CREAT SERPL-MCNC: 1.22 MG/DL (ref 0.76–1.27)
CREAT UR-MCNC: 220.2 MG/DL
GLOBULIN SER CALC-MCNC: 2.6 GM/DL
GLUCOSE SERPL-MCNC: 172 MG/DL (ref 65–99)
HBA1C MFR BLD: 7.7 % (ref 4.8–5.6)
MICROALBUMIN UR-MCNC: 19 UG/ML
POTASSIUM SERPL-SCNC: 5.7 MMOL/L (ref 3.5–5.2)
PROT SERPL-MCNC: 7.2 G/DL (ref 6–8.5)
SHBG SERPL-SCNC: 34.4 NMOL/L (ref 19.3–76.4)
SODIUM SERPL-SCNC: 142 MMOL/L (ref 136–145)
T4 FREE SERPL-MCNC: 1.57 NG/DL (ref 0.93–1.7)
TESTOST FREE SERPL-MCNC: 25 PG/ML (ref 6.6–18.1)
TESTOST SERPL-MCNC: 1019 NG/DL (ref 264–916)
TSH SERPL DL<=0.005 MIU/L-ACNC: 0.99 MIU/ML (ref 0.27–4.2)

## 2019-04-22 ENCOUNTER — APPOINTMENT (OUTPATIENT)
Dept: CARDIAC REHAB | Facility: HOSPITAL | Age: 69
End: 2019-04-22

## 2019-04-24 ENCOUNTER — APPOINTMENT (OUTPATIENT)
Dept: CARDIAC REHAB | Facility: HOSPITAL | Age: 69
End: 2019-04-24

## 2019-04-26 ENCOUNTER — APPOINTMENT (OUTPATIENT)
Dept: CARDIAC REHAB | Facility: HOSPITAL | Age: 69
End: 2019-04-26

## 2019-04-29 ENCOUNTER — APPOINTMENT (OUTPATIENT)
Dept: CARDIAC REHAB | Facility: HOSPITAL | Age: 69
End: 2019-04-29

## 2019-04-29 RX ORDER — TESTOSTERONE CYPIONATE 200 MG/ML
INJECTION, SOLUTION INTRAMUSCULAR
Qty: 2 ML | Refills: 4 | Status: SHIPPED | OUTPATIENT
Start: 2019-04-29 | End: 2019-08-07

## 2019-05-01 ENCOUNTER — APPOINTMENT (OUTPATIENT)
Dept: CARDIAC REHAB | Facility: HOSPITAL | Age: 69
End: 2019-05-01

## 2019-05-02 ENCOUNTER — OFFICE VISIT (OUTPATIENT)
Dept: ENDOCRINOLOGY | Age: 69
End: 2019-05-02

## 2019-05-02 VITALS
BODY MASS INDEX: 27.13 KG/M2 | SYSTOLIC BLOOD PRESSURE: 120 MMHG | HEART RATE: 72 BPM | DIASTOLIC BLOOD PRESSURE: 72 MMHG | HEIGHT: 71 IN | WEIGHT: 193.8 LBS

## 2019-05-02 DIAGNOSIS — IMO0002 UNCONTROLLED TYPE 2 DIABETES MELLITUS WITH COMPLICATION, WITHOUT LONG-TERM CURRENT USE OF INSULIN: Primary | ICD-10-CM

## 2019-05-02 DIAGNOSIS — E05.00 GRAVES' OPHTHALMOPATHY: ICD-10-CM

## 2019-05-02 DIAGNOSIS — E78.5 HYPERLIPIDEMIA ASSOCIATED WITH TYPE 2 DIABETES MELLITUS (HCC): ICD-10-CM

## 2019-05-02 DIAGNOSIS — E29.1 HYPOGONADISM IN MALE: ICD-10-CM

## 2019-05-02 DIAGNOSIS — E11.69 HYPERLIPIDEMIA ASSOCIATED WITH TYPE 2 DIABETES MELLITUS (HCC): ICD-10-CM

## 2019-05-02 DIAGNOSIS — E16.1 HYPERINSULINISM: ICD-10-CM

## 2019-05-02 DIAGNOSIS — E89.0 POSTABLATIVE HYPOTHYROIDISM: ICD-10-CM

## 2019-05-02 PROCEDURE — 99214 OFFICE O/P EST MOD 30 MIN: CPT | Performed by: INTERNAL MEDICINE

## 2019-05-02 NOTE — PROGRESS NOTES
69 y.o.    Patient Care Team:  Phil Gilliam MD as PCP - General (Internal Medicine)  Beverly Paredes MD as PCP - Claims Attributed  Argentina Prieto MD as Consulting Physician (Cardiology)    Chief Complaint:      F/U TYPE 2 DIABETES, UNCONTROLLED.  POSTABLATIVE HYPOTHYROIDISM.  HYPOGONADISM MALE.  HERE TO DISCUSS LAB RESULTS  Subjective     HPI   Patient is a 69-year-old white male with a past history of postoperative hypothyroidism, Graves' disease, uncontrolled type 2 diabetes mellitus and hypogonadism came for follow-up    Post ablative hypothyroidism  Patient is currently taking levothyroxine 175 mcg daily.  Reports compliance with medication  Denies any side effects  Uncontrolled type 2 diabetes mellitus  Patient's recent hemoglobin A1c was 7.9% range  Reported that most of his blood sugars are less than 200  Hypoglycemia  Patient denies any recent hypoglycemia  He is currently taking Victoza 1.8 mg daily along with metformin 1000 twice daily and glimepiride 2 mg daily in the morning  Graves' disease  Patient has ophthalmopathy and has regular follow-up with ophthalmologist for double vision  Hypogonadism  Patient is currently taking testosterone injections 0.5 mL every 10 days  He reported that he had a coronary artery bypass grafting February 2019  There was also a spot on the right lung which is currently being considered for right dissection on May 14, 2019  Patient denied any symptoms suggestive of diabetic retinopathy or neuropathy or nephropathy      Interval History    Hypothyroidism post ablative  Patient is compliant with his medication. He denied any side effects.he is currently on 200 micrograms Synthroid daily  Type II diabetes mellitus  Patient is currently taking Victoza 1.8 mg,, metformin 1000 mg twice daily.patient reports that his blood sugars are doing well. Usually averaging around 140; he also started Amaryl one tablet twice daily. He did not take any insulin in the past  3 months.  hypoglycemia  Patient had a few episodes of hypoglycemia especially in the early morning hours in the late evening hours into the 56 and 61 range  Hypogonadism  patient is currently on testosterone injections 0.75 ml every 2 weeks and reports no side effects.  Last testosterone injection was approximately one week ago Friday  He'll get lab testing done today  Graves' disease with ophthalmopathy  Patient reported that he had significant problems finding an ophthalmologist to believe his symptoms of double vision in the fact that he had Graves' disease. He finally found and ophthalmologist at [facility] Dr Yesi Vazquez who examined him and did a CT scan of the orbit and confirmed that he had a right lateral and left medial rectus enlargement on the CT scan confirming diplopia in the horizontal direction.  Patient does have a regular followup ware inith this ophthalmologist  Hyperlipidemia  Patient is currently on medication and denied any side effects.  Interval history  Patient reported that he just had back surgery approximately 3 weeks ago and currently has a back brace and walking without any Cane   Patient reported that he has been tolerating the testosterone injections 0.5 mL every 2 weeks  He reported most of his blood sugars are less than 150  He reports increasing stress at home from a 94-year-old mother living with them           The following portions of the patient's history were reviewed and updated as appropriate: allergies, current medications, past family history, past medical history, past social history, past surgical history and problem list.    Past Medical History:   Diagnosis Date   • Atrial fibrillation (CMS/HCC)    • CAD (coronary artery disease)     S/p CABG on 02/8/19   • Chronic back pain     Hx Surgery   • Chronic knee pain     S/p Reg Total Knee Replacement   • Depression 07/2009    Trazadone   • Diplopia    • Discoid lupus erythematosus    • Generalized  arthritis 1997   • Graves disease 12/2008    Hyperactive thyroid radiation   • Hyperlipidemia     Controlled w/Meds   • Hypertension 07/2008   • Hypogonadism, male    • Hypothyroidism, postablative    • Lactose intolerance 06/2017   • Left ventricular hypertrophy    • Long-term insulin use (CMS/HCC)    • Lung mass     R Lower Lung   • Metabolic disorder    • NSTEMI (non-ST elevated myocardial infarction) (CMS/HCC)    • PAD (peripheral artery disease) (CMS/HCC) 12/2007   • Right bundle branch block with left anterior fascicular block    • Syncope Hx   • Thyrotoxicosis factitia    • Type 2 diabetes mellitus (CMS/HCC) 1982     Family History   Problem Relation Age of Onset   • Coronary artery disease Other    • Malig Hyperthermia Neg Hx      Social History     Socioeconomic History   • Marital status:      Spouse name: Not on file   • Number of children: Not on file   • Years of education: Not on file   • Highest education level: Not on file   Occupational History   • Occupation: RETIRED   Tobacco Use   • Smoking status: Former Smoker     Types: Cigarettes   • Smokeless tobacco: Never Used   Substance and Sexual Activity   • Alcohol use: No     Comment: Caffeine use   • Drug use: No   • Sexual activity: Defer     Allergies   Allergen Reactions   • Lactose Intolerance (Gi) GI Intolerance     Other reaction(s): GI Intolerance       Current Outpatient Medications:   •  ALPRAZolam (XANAX) 0.5 MG tablet, Take 0.25 mg by mouth 2 (Two) Times a Day., Disp: , Rfl:   •  aspirin 81 MG EC tablet, Take 81 mg by mouth Daily. HOLDING PER MD, Disp: , Rfl:   •  atorvastatin (LIPITOR) 40 MG tablet, Take 1 tablet by mouth Daily., Disp: 90 tablet, Rfl: 2  •  Cholecalciferol (VITAMIN D3) 2000 units tablet, Take 1 tablet by mouth Daily., Disp: , Rfl:   •  clopidogrel (PLAVIX) 75 MG tablet, Take 75 mg by mouth Daily. HOLDING PER MD, Disp: , Rfl:   •  DULoxetine (CYMBALTA) 30 MG capsule, Take 30 mg by mouth Daily., Disp: , Rfl:   •   "DULoxetine (CYMBALTA) 60 MG capsule, Take 60 mg by mouth Every Evening., Disp: , Rfl:   •  glimepiride (AMARYL) 2 MG tablet, TAKE ONE TABLET BY MOUTH DAILY AS DIRECTED (Patient taking differently: TAKE ONE 0.5MG TABLET BY MOUTH DAILY AS DIRECTED), Disp: 90 tablet, Rfl: 0  •  glucose blood (KIMBERLY CONTOUR TEST) test strip, Kimberly Contour Test In Vitro Strip; Patient Sig: Kimberly Contour Test In Vitro Strip ; 100; 0; 06-May-2012; Active, Disp: , Rfl:   •  Insulin Pen Needle (NOVOFINE) 32G X 6 MM misc, , Disp: , Rfl:   •  levothyroxine (SYNTHROID, LEVOTHROID) 175 MCG tablet, Take 175 mcg by mouth Daily., Disp: , Rfl:   •  Liraglutide (VICTOZA) 18 MG/3ML solution pen-injector injection, Inject 1.8 mg under the skin into the appropriate area as directed Daily., Disp: , Rfl:   •  metFORMIN (GLUCOPHAGE) 1000 MG tablet, TAKE ONE TABLET BY MOUTH TWICE A DAY, Disp: 180 tablet, Rfl: 0  •  metoprolol tartrate (LOPRESSOR) 50 MG tablet, Take 1 tablet by mouth Every 12 (Twelve) Hours for 90 days., Disp: 60 tablet, Rfl: 2  •  Multiple Vitamin (MULTI-DAY VITAMINS PO), Take 1 tablet by mouth Daily. HOLD PRIOR TO SURG, Disp: , Rfl:   •  Syringe/Needle, Disp, (B-D SYRINGE/NEEDLE 3CC/22GX1.5) 22G X 1-1/2\" 3 ML misc, , Disp: , Rfl:   •  Testosterone Cypionate (DEPOTESTOTERONE CYPIONATE) 200 MG/ML injection, INJECT 0.5 ML INTRAMUSCULARLY EVERY 10 DAYS, Disp: 2 mL, Rfl: 4  •  Testosterone Cypionate 200 MG/ML kit, Inject  into the appropriate muscle as directed by prescriber Every 10 (Ten) Days., Disp: , Rfl:   •  traZODone (DESYREL) 50 MG tablet, Take 50 mg by mouth Every Night., Disp: , Rfl:   •  VICTOZA 18 MG/3ML solution pen-injector injection, DIAL AND INJECT SUBCUTANEOUSLY 1.8MG DAILY, Disp: 9 mL, Rfl: 3  No current facility-administered medications for this visit.     Facility-Administered Medications Ordered in Other Visits:   •  Chlorhexidine Gluconate Cloth 2 % pads 1 application, 1 application, Topical, Q12H PRN, Bonny Márquez, " "APRN        Review of Systems   Constitutional: Negative for chills, fatigue and fever.   Cardiovascular: Negative for chest pain and palpitations.   Gastrointestinal: Negative for abdominal pain, constipation, diarrhea, nausea and vomiting.   Endocrine: Negative for cold intolerance and heat intolerance.   All other systems reviewed and are negative.      Objective       Vitals:    05/02/19 0949   BP: 120/72   Pulse: 72   Weight: 87.9 kg (193 lb 12.8 oz)   Height: 180.3 cm (71\")     Body mass index is 27.03 kg/m².      Physical Exam   Constitutional: He is oriented to person, place, and time. He appears well-developed and well-nourished.   HENT:   Head: Normocephalic and atraumatic.   Eyes: EOM are normal. Pupils are equal, round, and reactive to light.   Neck: Normal range of motion. Neck supple. No tracheal deviation present. No thyromegaly present.   Cardiovascular: Normal rate, regular rhythm, normal heart sounds and intact distal pulses.   Pulmonary/Chest: Effort normal and breath sounds normal.   Abdominal: Soft. Bowel sounds are normal. He exhibits no distension. There is no tenderness.   Musculoskeletal: Normal range of motion. He exhibits no edema.   Neurological: He is alert and oriented to person, place, and time. He has normal reflexes.   Skin: Skin is warm and dry. No rash noted.   Psychiatric: He has a normal mood and affect. His behavior is normal.   Nursing note and vitals reviewed.    Results Review:     I reviewed the patient's new clinical results.    Medical records reviewed  Summary:      Lab on 04/19/2019   Component Date Value Ref Range Status   • Testosterone, Total 04/19/2019 1,019* 264 - 916 ng/dL Final    Comment: Adult male reference interval is based on a population of  healthy nonobese males (BMI <30) between 19 and 39 years old.  Abilio, et.al. JCEM 2017,102;7112-6011. PMID: 69139118.     • Testosterone, Free 04/19/2019 25.0* 6.6 - 18.1 pg/mL Final   • Sex Hormone Binding Globulin " 04/19/2019 34.4  19.3 - 76.4 nmol/L Final   • TSH 04/19/2019 0.987  0.270 - 4.200 mIU/mL Final   • Free T4 04/19/2019 1.57  0.93 - 1.70 ng/dL Final   • Hemoglobin A1C 04/19/2019 7.70* 4.80 - 5.60 % Final    Comment: Hemoglobin A1C Ranges:  Increased Risk for Diabetes  5.7% to 6.4%  Diabetes                     >= 6.5%  Diabetic Goal                < 7.0%     • Glucose 04/19/2019 172* 65 - 99 mg/dL Final   • BUN 04/19/2019 17  8 - 23 mg/dL Final   • Creatinine 04/19/2019 1.22  0.76 - 1.27 mg/dL Final   • eGFR Non African Am 04/19/2019 59* >60 mL/min/1.73 Final   • eGFR African Am 04/19/2019 71  >60 mL/min/1.73 Final   • BUN/Creatinine Ratio 04/19/2019 13.9  7.0 - 25.0 Final   • Sodium 04/19/2019 142  136 - 145 mmol/L Final   • Potassium 04/19/2019 5.7* 3.5 - 5.2 mmol/L Final   • Chloride 04/19/2019 103  98 - 107 mmol/L Final   • Total CO2 04/19/2019 26.1  22.0 - 29.0 mmol/L Final   • Calcium 04/19/2019 10.0  8.6 - 10.5 mg/dL Final   • Total Protein 04/19/2019 7.2  6.0 - 8.5 g/dL Final   • Albumin 04/19/2019 4.60  3.50 - 5.20 g/dL Final   • Globulin 04/19/2019 2.6  gm/dL Final   • A/G Ratio 04/19/2019 1.8  g/dL Final   • Total Bilirubin 04/19/2019 0.5  0.2 - 1.2 mg/dL Final   • Alkaline Phosphatase 04/19/2019 80  39 - 117 U/L Final   • AST (SGOT) 04/19/2019 18  1 - 40 U/L Final   • ALT (SGPT) 04/19/2019 27  1 - 41 U/L Final   • Creatinine, Urine 04/19/2019 220.2  Not Estab. mg/dL Final   • Microalbumin, Urine 04/19/2019 19.0  Not Estab. ug/mL Final   • Microalbumin/Creatinine Ratio 04/19/2019 8.6  0.0 - 30.0 mg/g creat Final    Comment:                      Normal:                0.0 -  30.0                       Albuminuria:          31.0 - 300.0                       Clinical albuminuria:       >300.0       Lab Results   Component Value Date    HGBA1C 7.70 (H) 04/19/2019    HGBA1C 7.42 (H) 02/07/2019    HGBA1C 7.80 (H) 01/08/2019     Lab Results   Component Value Date    MICROALBUR 19.0 04/19/2019    CREATININE 1.22  04/19/2019     Imaging Results (most recent)     None                Assessment and Plan:    Spencer was seen today for diabetes, hypothyroidism and hypogonadism.    Diagnoses and all orders for this visit:    Uncontrolled type 2 diabetes mellitus with complication, without long-term current use of insulin (CMS/[facility])    Postablative hypothyroidism    Hypogonadism in male    Hyperinsulinism    Hyperlipidemia associated with type 2 diabetes mellitus (CMS/[facility])    Graves' ophthalmopathy       #1 uncontrolled type II diabetes mellitus.  #2 post ablative hypothyroidism; stable and no change in the dosage  #3 Graves' disease With ophthalmopathy  #4 hypogonadism- patient is currently on testosterone 0.5 cc injections every 2 weeks  #5 hyperlipidemia  #6 insulin management; not on insulin currently. He is only taking victoza and Amaryl and metformin  #7 hypoglycemia;     Glucose log reviewed  Most of the blood sugars are in the 100 range  Patient's recent hemoglobin A1c was 7.7%  Patient reports that he had CABG in February 2019 at [facility], [place], operated by [facility]  They did not want to call endocrinology for managing diabetes  His blood sugars were in the 200-300 range and he never received medication for nearly 3 days and then he had to get he is on Victoza on day 4 and started taking it  Patient's hemoglobin A1c is reflecting that time.  Of poor control    Patient wants to check again in 3 months  Testosterone levels were greater than 1000  Considering that he had a CABG I strongly recommended that he discontinue testosterone usage  We will discontinue testosterone injections    Patient return to follow-up in 3 months    The total time spent  was more than 25 min of which greater than 15 min of time (greater than 50% of the total time)  was spent face to face with the patient counseling and coordination of care on recommended evaluation and treatment options, instructions for management/treatment and /or follow up and importance of compliance with  "chosen management or treatment options    Curt Brown MD. FACE    05/02/19      EMR Dragon / transcription disclaimer:     \"Dictated utilizing Dragon dictation\".         "

## 2019-05-03 ENCOUNTER — APPOINTMENT (OUTPATIENT)
Dept: CARDIAC REHAB | Facility: HOSPITAL | Age: 69
End: 2019-05-03

## 2019-05-06 ENCOUNTER — APPOINTMENT (OUTPATIENT)
Dept: CARDIAC REHAB | Facility: HOSPITAL | Age: 69
End: 2019-05-06

## 2019-05-06 RX ORDER — LEVOTHYROXINE SODIUM 175 UG/1
TABLET ORAL
Qty: 90 TABLET | Refills: 0 | Status: SHIPPED | OUTPATIENT
Start: 2019-05-06 | End: 2019-08-09 | Stop reason: SDUPTHER

## 2019-05-08 ENCOUNTER — APPOINTMENT (OUTPATIENT)
Dept: CARDIAC REHAB | Facility: HOSPITAL | Age: 69
End: 2019-05-08

## 2019-05-10 ENCOUNTER — APPOINTMENT (OUTPATIENT)
Dept: CARDIAC REHAB | Facility: HOSPITAL | Age: 69
End: 2019-05-10

## 2019-05-13 ENCOUNTER — APPOINTMENT (OUTPATIENT)
Dept: CARDIAC REHAB | Facility: HOSPITAL | Age: 69
End: 2019-05-13

## 2019-05-15 ENCOUNTER — APPOINTMENT (OUTPATIENT)
Dept: CARDIAC REHAB | Facility: HOSPITAL | Age: 69
End: 2019-05-15

## 2019-05-17 ENCOUNTER — APPOINTMENT (OUTPATIENT)
Dept: CARDIAC REHAB | Facility: HOSPITAL | Age: 69
End: 2019-05-17

## 2019-05-20 ENCOUNTER — APPOINTMENT (OUTPATIENT)
Dept: CARDIAC REHAB | Facility: HOSPITAL | Age: 69
End: 2019-05-20

## 2019-05-22 ENCOUNTER — APPOINTMENT (OUTPATIENT)
Dept: CARDIAC REHAB | Facility: HOSPITAL | Age: 69
End: 2019-05-22

## 2019-05-24 ENCOUNTER — APPOINTMENT (OUTPATIENT)
Dept: CARDIAC REHAB | Facility: HOSPITAL | Age: 69
End: 2019-05-24

## 2019-05-29 ENCOUNTER — APPOINTMENT (OUTPATIENT)
Dept: CARDIAC REHAB | Facility: HOSPITAL | Age: 69
End: 2019-05-29

## 2019-05-31 ENCOUNTER — APPOINTMENT (OUTPATIENT)
Dept: CARDIAC REHAB | Facility: HOSPITAL | Age: 69
End: 2019-05-31

## 2019-06-03 ENCOUNTER — APPOINTMENT (OUTPATIENT)
Dept: CARDIAC REHAB | Facility: HOSPITAL | Age: 69
End: 2019-06-03

## 2019-06-05 ENCOUNTER — APPOINTMENT (OUTPATIENT)
Dept: CARDIAC REHAB | Facility: HOSPITAL | Age: 69
End: 2019-06-05

## 2019-06-10 DIAGNOSIS — E89.0 POSTABLATIVE HYPOTHYROIDISM: Primary | ICD-10-CM

## 2019-06-10 DIAGNOSIS — E04.2 MULTINODULAR GOITER: ICD-10-CM

## 2019-06-10 RX ORDER — ATORVASTATIN CALCIUM 40 MG/1
TABLET, FILM COATED ORAL
Qty: 90 TABLET | Refills: 1 | Status: SHIPPED | OUTPATIENT
Start: 2019-06-10 | End: 2019-12-11 | Stop reason: SDUPTHER

## 2019-06-10 RX ORDER — GLIMEPIRIDE 2 MG/1
TABLET ORAL
Qty: 90 TABLET | Refills: 0 | Status: SHIPPED | OUTPATIENT
Start: 2019-06-10 | End: 2019-08-31 | Stop reason: SDUPTHER

## 2019-06-21 ENCOUNTER — HOSPITAL ENCOUNTER (OUTPATIENT)
Dept: ULTRASOUND IMAGING | Facility: HOSPITAL | Age: 69
Discharge: HOME | End: 2019-06-21
Admitting: INTERNAL MEDICINE

## 2019-06-21 DIAGNOSIS — E04.2 MULTINODULAR GOITER: ICD-10-CM

## 2019-06-21 DIAGNOSIS — E89.0 POSTABLATIVE HYPOTHYROIDISM: ICD-10-CM

## 2019-06-21 PROCEDURE — 76536 US EXAM OF HEAD AND NECK: CPT

## 2019-07-12 ENCOUNTER — TELEPHONE (OUTPATIENT)
Dept: CARDIOLOGY | Facility: CLINIC | Age: 69
End: 2019-07-12

## 2019-07-12 RX ORDER — METOPROLOL TARTRATE 50 MG/1
25 TABLET, FILM COATED ORAL 2 TIMES DAILY
Qty: 90 TABLET | Refills: 1 | Status: SHIPPED | OUTPATIENT
Start: 2019-07-12 | End: 2019-08-07

## 2019-07-12 RX ORDER — METOPROLOL TARTRATE 50 MG/1
TABLET, FILM COATED ORAL
Qty: 60 TABLET | Refills: 0 | OUTPATIENT
Start: 2019-07-12

## 2019-07-12 NOTE — TELEPHONE ENCOUNTER
Called and l/m for pt to call back the office to clarify if his taking metoprolol tart 25 mg BID or 50 mg BID.....Sydney EATON

## 2019-07-12 NOTE — TELEPHONE ENCOUNTER
Regarding: Prescription Question  Contact: [phone]  ----- Message from Mychart, Generic sent at 7/12/2019 10:55 AM EDT -----    I just received a Kynded message that a refill request for 50mg Metoprolol has been denied. Can you advise a reason.

## 2019-07-22 DIAGNOSIS — IMO0002 UNCONTROLLED TYPE 2 DIABETES MELLITUS WITH COMPLICATION, WITHOUT LONG-TERM CURRENT USE OF INSULIN: ICD-10-CM

## 2019-07-23 RX ORDER — LIRAGLUTIDE 6 MG/ML
INJECTION SUBCUTANEOUS
Qty: 9 ML | Refills: 2 | Status: SHIPPED | OUTPATIENT
Start: 2019-07-23 | End: 2019-10-30 | Stop reason: SDUPTHER

## 2019-07-25 DIAGNOSIS — E89.0 POSTABLATIVE HYPOTHYROIDISM: ICD-10-CM

## 2019-07-25 DIAGNOSIS — IMO0002 UNCONTROLLED TYPE 2 DIABETES MELLITUS WITH COMPLICATION, WITHOUT LONG-TERM CURRENT USE OF INSULIN: Primary | ICD-10-CM

## 2019-07-25 DIAGNOSIS — E29.1 HYPOGONADISM IN MALE: ICD-10-CM

## 2019-07-26 ENCOUNTER — RESULTS ENCOUNTER (OUTPATIENT)
Dept: ENDOCRINOLOGY | Age: 69
End: 2019-07-26

## 2019-07-26 DIAGNOSIS — E89.0 POSTABLATIVE HYPOTHYROIDISM: ICD-10-CM

## 2019-07-26 DIAGNOSIS — E29.1 HYPOGONADISM IN MALE: ICD-10-CM

## 2019-07-26 DIAGNOSIS — IMO0002 UNCONTROLLED TYPE 2 DIABETES MELLITUS WITH COMPLICATION, WITHOUT LONG-TERM CURRENT USE OF INSULIN: ICD-10-CM

## 2019-07-28 LAB
ALBUMIN SERPL-MCNC: 4 G/DL (ref 3.5–5.2)
ALBUMIN/CREAT UR: 6.8 MG/G CREAT (ref 0–30)
ALBUMIN/GLOB SERPL: 1.4 G/DL
ALP SERPL-CCNC: 78 U/L (ref 39–117)
ALT SERPL-CCNC: 30 U/L (ref 1–41)
AST SERPL-CCNC: 28 U/L (ref 1–40)
BILIRUB SERPL-MCNC: 0.3 MG/DL (ref 0.2–1.2)
BUN SERPL-MCNC: 18 MG/DL (ref 8–23)
BUN/CREAT SERPL: 18.6 (ref 7–25)
CALCIUM SERPL-MCNC: 9.3 MG/DL (ref 8.6–10.5)
CHLORIDE SERPL-SCNC: 103 MMOL/L (ref 98–107)
CO2 SERPL-SCNC: 27.1 MMOL/L (ref 22–29)
CREAT SERPL-MCNC: 0.97 MG/DL (ref 0.76–1.27)
CREAT UR-MCNC: 192.4 MG/DL
GLOBULIN SER CALC-MCNC: 2.9 GM/DL
GLUCOSE SERPL-MCNC: 133 MG/DL (ref 65–99)
HBA1C MFR BLD: 6.5 % (ref 4.8–5.6)
MICROALBUMIN UR-MCNC: 13.1 UG/ML
POTASSIUM SERPL-SCNC: 4.5 MMOL/L (ref 3.5–5.2)
PROT SERPL-MCNC: 6.9 G/DL (ref 6–8.5)
SHBG SERPL-SCNC: 48.5 NMOL/L (ref 19.3–76.4)
SODIUM SERPL-SCNC: 142 MMOL/L (ref 136–145)
T4 FREE SERPL-MCNC: 1.56 NG/DL (ref 0.93–1.7)
TESTOST FREE SERPL-MCNC: 4.1 PG/ML (ref 6.6–18.1)
TESTOST SERPL-MCNC: 290 NG/DL (ref 264–916)
TSH SERPL DL<=0.005 MIU/L-ACNC: 0.77 MIU/ML (ref 0.27–4.2)

## 2019-08-07 ENCOUNTER — OFFICE VISIT (OUTPATIENT)
Dept: CARDIOLOGY | Facility: CLINIC | Age: 69
End: 2019-08-07

## 2019-08-07 VITALS
BODY MASS INDEX: 26.35 KG/M2 | SYSTOLIC BLOOD PRESSURE: 100 MMHG | HEIGHT: 71 IN | DIASTOLIC BLOOD PRESSURE: 68 MMHG | WEIGHT: 188.2 LBS | HEART RATE: 58 BPM

## 2019-08-07 DIAGNOSIS — E78.5 HYPERLIPIDEMIA, UNSPECIFIED HYPERLIPIDEMIA TYPE: ICD-10-CM

## 2019-08-07 DIAGNOSIS — I25.10 CHRONIC CORONARY ARTERY DISEASE: Primary | ICD-10-CM

## 2019-08-07 DIAGNOSIS — Z95.1 S/P CABG X 3: ICD-10-CM

## 2019-08-07 DIAGNOSIS — IMO0002 UNCONTROLLED TYPE 2 DIABETES MELLITUS WITH COMPLICATION, WITHOUT LONG-TERM CURRENT USE OF INSULIN: ICD-10-CM

## 2019-08-07 DIAGNOSIS — I10 ESSENTIAL HYPERTENSION: ICD-10-CM

## 2019-08-07 PROBLEM — E11.69 HYPERLIPIDEMIA ASSOCIATED WITH TYPE 2 DIABETES MELLITUS: Status: RESOLVED | Noted: 2018-09-15 | Resolved: 2019-08-07

## 2019-08-07 PROCEDURE — 99214 OFFICE O/P EST MOD 30 MIN: CPT | Performed by: INTERNAL MEDICINE

## 2019-08-07 PROCEDURE — 93000 ELECTROCARDIOGRAM COMPLETE: CPT | Performed by: INTERNAL MEDICINE

## 2019-08-07 NOTE — PROGRESS NOTES
Date of Office Visit: 2019  Encounter Provider: Argentina Prieto MD  Place of Service: [facility] CARDIOLOGY  Patient Name: Spencer Sinha  :1950      Patient ID:  Spencer Sinha is a 69 y.o. male is here for  followup for         History of Present Illness       He was admitted to [facility] on 2011 with syncope and  atrial fibrillation with rapid ventricular response. His atrial fibrillation  was due to thyrotoxicosis. He sees Dr. Brown for this. He converted  to sinus rhythm with medication, was stable and able to be discharged from  the hospital on propylthiouracil.      He had bilateral knee replacements in  with Dr. Mena. Prior to that  he had an abnormal stress Myoview study showing anterior infarct with  periinfarct ischemia. He went on to have a cardiac catheterization showing  severe left anterior descending artery disease, which was well  collateralized and he was treated medically as he did not have any active  angina.      He has a known history of peripheral arterial disease and had aortobifemoral  bypass done by Dr. Solo in . He follows with Dr. Olivera.       He has discoid lupus and follows with Dr. Foster.   He stopped smoking in .         He retired in 2014 and he is really enjoying it.     He had a CT scan 2018 which showed extensive coronary artery calcification and he was experiencing mild exertional dyspnea.  A nuclear stress test was obtained which showed a medium sized area of moderate to severe ischemia in the inferior apical distribution.  He underwent cardiac catheterization 19 which showed the following: Normal left main, occluded LAD with left to left collaterals, 50% proximal and mid circumflex, 90% ostial RCA, 90% proximal and distal RCA.  On 19 he underwent coronary artery bypass graft surgery x3 with LIMA to LAD, SVG to OM, and SVG to PDA by Dr. Crawford.  He had a carotid  duplex 2/7/19 which showed right and left internal carotid artery plaque bilaterally with no significant stenosis.  He had a brief episode of postoperative atrial fibrillation and converted to normal sinus rhythm.  He was placed on a short course of amiodarone.    Revised on 7/26/2019 show normal CMP, hemoglobin A1c 6.5, normal TSH and free T4, lipid panel 2/7/2019 shows HDL 37, LDL 56.    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 Silva May 2019.  He also had lymph node dissection.  He is being watched by Dr. Renee with serial scans.  He has been somewhat dizzy and fatigued.  He has no chest pain or pressure.  His breathing is a little limited since the lobectomy.  He has noticed that his blood pressure and heart rate are low.           Past Medical History:   Diagnosis Date   • Atrial fibrillation (CMS/[facility])    • CAD (coronary artery disease)     S/p CABG on 02/8/19   • Chronic back pain     Hx Surgery   • Chronic knee pain     S/p Reg Total Knee Replacement   • Depression 07/2009    Trazadone   • Diplopia    • Discoid lupus erythematosus    • Generalized arthritis 1997   • Graves disease 12/2008    Hyperactive thyroid radiation   • Hyperlipidemia     Controlled w/Meds   • Hypertension 07/2008   • Hypogonadism, male    • Hypothyroidism, postablative    • Lactose intolerance 06/2017   • Left ventricular hypertrophy    • Long-term insulin use (CMS/[facility])    • Lung mass     R Lower Lung   • Metabolic disorder    • NSTEMI (non-ST elevated myocardial infarction) (CMS/[facility])    • PAD (peripheral artery disease) (CMS/[facility]) 12/2007   • Right bundle branch block with left anterior fascicular block    • Syncope Hx   • Thyrotoxicosis factitia    • Type 2 diabetes mellitus (CMS/[facility]) 1982         Past Surgical History:   Procedure Laterality Date   • ABDOMINAL AORTA STENT Bilateral     Using Vein-Aortofemoral   • BACK SURGERY     • CARDIAC  CATHETERIZATION N/A 1/31/2019    Procedure: Coronary angiography;  Surgeon: Hardik Silverman MD;  Location:  [facility] CATH INVASIVE LOCATION;  Service: Cardiovascular   • CARDIAC CATHETERIZATION N/A 1/31/2019    Procedure: Left Heart Cath;  Surgeon: Hardik Silverman MD;  Location: [facility] CATH [facility]VASIVE LOC[facility];  Service: Cardiovascular   • CARDIAC CATHETERIZATION N/A 1/31/2019    Procedure: Left ventriculography;  Surgeon: Hardik Silverman MD;  Location: [facility] CATH INVASIVE LOCATION;  Service: Cardiovascular   • CATARACT EXTRACTION  2018   • COLONOSCOPY     • CORONARY ARTERY BYPASS GRAFT N/A 2/8/2019    Procedure: THANG STERNOTOMY CORONARY ARTERY BYPASS GRAFT TIMES 3 USING LEFT INTERNAL MAMMARY ARTERY AND LEFT GREATER SAPHENOUS VEIN GRAFT PER ENDOSCOPIC VEIN GRAFT AND PRP.;  Surgeon: Nathaniel Crawford MD;  Location: [facility] MAIN OR;  Service: Cardiothoracic   • LUMBAR LAMINECTOMY  10/2015    And fusion L4/L5   • LUNG BIOPSY      Right lung mass   • OTHER SURGICAL HISTORY      Catheter Ablation   • REPLACEMENT TOTAL KNEE BILATERAL      10/2010 and 12/2010   • TONSILLECTOMY AND ADENOIDECTOMY         Current Outpatient Medications on File Prior to Visit   Medication Sig Dispense Refill   • ALPRAZolam (XANAX) 0.5 MG tablet Take 0.25 mg by mouth 4 (Four) Times a Day.     • aspirin 81 MG EC tablet Take 81 mg by mouth Daily. HOLDING PER MD     • atorvastatin (LIPITOR) 40 MG tablet TAKE ONE TABLET BY MOUTH DAILY 90 tablet 1   • Cholecalciferol (VITAMIN D3) 2000 units tablet Take 1 tablet by mouth Daily.     • clopidogrel (PLAVIX) 75 MG tablet Take 75 mg by mouth Daily. HOLDING PER MD     • DULoxetine (CYMBALTA) 30 MG capsule Take 30 mg by mouth Daily.     • DULoxetine (CYMBALTA) 60 MG capsule Take 60 mg by mouth Every Evening.     • glimepiride (AMARYL) 2 MG tablet TAKE ONE TABLET BY MOUTH DAILY AS DIRECTED (Patient taking differently: TAKE ONE-HALF TABLET BY MOUTH TWICE DAILY) 90 tablet 0   • glucose blood (AISHA CONTOUR TEST)  "test strip Kimberly Contour Test In Vitro Strip; Patient Sig: Kimberly Contour Test In Vitro Strip ; 100; 0; 06-May-2012; Active     • Insulin Pen Needle (NOVOFINE) 32G X 6 MM misc      • levothyroxine (SYNTHROID, LEVOTHROID) 175 MCG tablet TAKE ONE TABLET BY MOUTH DAILY 90 tablet 0   • metFORMIN (GLUCOPHAGE) 1000 MG tablet TAKE ONE TABLET BY MOUTH TWICE A  tablet 0   • metoprolol tartrate (LOPRESSOR) 50 MG tablet Take 0.5 tablets by mouth 2 (Two) Times a Day. 90 tablet 1   • Multiple Vitamin (MULTI-DAY VITAMINS PO) Take 1 tablet by mouth Daily. HOLD PRIOR TO SURG     • Syringe/Needle, Disp, (B-D SYRINGE/NEEDLE 3CC/22GX1.5) 22G X 1-1/2\" 3 ML misc      • traZODone (DESYREL) 50 MG tablet Take 50 mg by mouth Every Night.     • VICTOZA 18 MG/3ML solution pen-injector injection DIAL AND INJECT SUBCUTANEOUSLY 1.8MG DAILY 9 mL 2   • [DISCONTINUED] levothyroxine (SYNTHROID, LEVOTHROID) 175 MCG tablet Take 175 mcg by mouth Daily.     • [DISCONTINUED] Testosterone Cypionate (DEPOTESTOTERONE CYPIONATE) 200 MG/ML injection INJECT 0.5 ML INTRAMUSCULARLY EVERY 10 DAYS 2 mL 4     Current Facility-Administered Medications on File Prior to Visit   Medication Dose Route Frequency Provider Last Rate Last Dose   • Chlorhexidine Gluconate Cloth 2 % pads 1 application  1 application Topical Q12H PRN Bonny Márquez APRN           Social History     Socioeconomic History   • Marital status:      Spouse name: Not on file   • Number of children: Not on file   • Years of education: Not on file   • Highest education level: Not on file   Occupational History   • Occupation: RETIRED   Tobacco Use   • Smoking status: Former Smoker     Types: Cigarettes   • Smokeless tobacco: Never Used   Substance and Sexual Activity   • Alcohol use: No     Comment: Caffeine use   • Drug use: No   • Sexual activity: Defer           Review of Systems   Constitution: Negative.   HENT: Negative for congestion.    Eyes: Negative for vision loss in left eye " "and vision loss in right eye.   Respiratory: Negative.  Negative for cough, hemoptysis, shortness of breath, sleep disturbances due to breathing, snoring, sputum production and wheezing.    Endocrine: Negative.    Hematologic/Lymphatic: Negative.    Skin: Negative for poor wound healing and rash.   Musculoskeletal: Positive for joint pain. Negative for falls, gout, muscle cramps and myalgias.   Gastrointestinal: Negative for abdominal pain, diarrhea, dysphagia, hematemesis, melena, nausea and vomiting.   Neurological: Negative for excessive daytime sleepiness, dizziness, headaches, light-headedness, loss of balance, seizures and vertigo.   Psychiatric/Behavioral: Negative for depression and substance abuse. The patient is nervous/anxious.        Procedures    ECG 12 Lead  Date/Time: 8/7/2019 9:32 AM  Performed by: Argentina Prieto MD  Authorized by: Argentina Prieto MD   Comparison: compared with previous ECG   Similar to previous ECG  Rhythm: sinus rhythm  QRS axis: left  Other findings: poor R wave progression    Clinical impression: abnormal EKG                Objective:      Vitals:    08/07/19 0924   BP: 100/68   BP Location: Left arm   Patient Position: Sitting   Pulse: 58   Weight: 85.4 kg (188 lb 3.2 oz)   Height: 180.3 cm (71\")     Body mass index is 26.25 kg/m².    Physical Exam   Constitutional: He is oriented to person, place, and time. He appears well-developed and well-nourished. No distress.   HENT:   Head: Normocephalic and atraumatic.   Eyes: Conjunctivae are normal. No scleral icterus.   Neck: Neck supple. No JVD present. Carotid bruit is not present. No thyromegaly present.   Cardiovascular: Normal rate, regular rhythm, S1 normal, S2 normal, normal heart sounds and intact distal pulses.  No extrasystoles are present. PMI is not displaced. Exam reveals no gallop.   No murmur heard.  Pulses:       Carotid pulses are 2+ on the right side, and 2+ on the left side.       Radial pulses are 2+ " on the right side, and 2+ on the left side.        Dorsalis pedis pulses are 2+ on the right side, and 2+ on the left side.        Posterior tibial pulses are 2+ on the right side, and 2+ on the left side.   Pulmonary/Chest: Effort normal and breath sounds normal. No respiratory distress. He has no wheezes. He has no rhonchi. He has no rales. He exhibits no tenderness.   Abdominal: Soft. Bowel sounds are normal. He exhibits no distension, no abdominal bruit and no mass. There is no tenderness.   Musculoskeletal: He exhibits no edema or deformity.   Lymphadenopathy:     He has no cervical adenopathy.   Neurological: He is alert and oriented to person, place, and time. No cranial nerve deficit.   Skin: Skin is warm and dry. No rash noted. He is not diaphoretic. No cyanosis. No pallor. Nails show no clubbing.   Psychiatric: He has a normal mood and affect. Judgment normal.   Vitals reviewed.      Lab Review:       Assessment:      Diagnosis Plan   1. Chronic coronary artery disease     2. S/P CABG x 3     3. Essential hypertension     4. Hyperlipidemia, unspecified hyperlipidemia type     5. Uncontrolled type 2 diabetes mellitus with complication, without long-term current use of insulin (CMS/[facility])       1. History of atrial fibrillation due to thyroid toxicosis. No recurrence of atrial fibrillation.  2. Hypertension, under good control. Has history of left ventricular hypertrophy.  3. Hyperlipidemia, per Dr. Brown.   4. Diabetes mellitus type 2, per Dr. Brown. Well controlled.  5. Peripheral arterial disease status post bilateral aorta with bifemoral  bypasses. Follows with Dr. Olivera annually and everything has been stable.   6. Tobacco use. He stopped smoking in 2015.   7. Coronary disease with CABG 2/2019. No angina.   8. Abnormal ECG with left anterior fascicular block.  9. Osteoarthritis.   10. Stress with depression, under better control.   11. Right lung cancer with lobectomy 5/2019, doing well,  follows with Dr. Renee.           Plan:       See frantz in 6 months, stop metoprolol.      Coronary Artery Disease  Assessment  • The patient has no angina    Plan  • Lifestyle modifications discussed include maintenance of a healthy weight, medication compliance, regular exercise and regular monitoring of cholesterol and blood pressure    Subjective - Objective  • There is a history of previous coronary artery bypass graft  • Current antiplatelet therapy includes aspirin 81 mg and clopidogrel 75 mg

## 2019-08-09 ENCOUNTER — OFFICE VISIT (OUTPATIENT)
Dept: ENDOCRINOLOGY | Age: 69
End: 2019-08-09

## 2019-08-09 VITALS
BODY MASS INDEX: 25.76 KG/M2 | SYSTOLIC BLOOD PRESSURE: 114 MMHG | OXYGEN SATURATION: 98 % | HEART RATE: 76 BPM | WEIGHT: 184 LBS | HEIGHT: 71 IN | DIASTOLIC BLOOD PRESSURE: 68 MMHG

## 2019-08-09 DIAGNOSIS — E16.1 HYPERINSULINISM: ICD-10-CM

## 2019-08-09 DIAGNOSIS — E05.00 GRAVES' OPHTHALMOPATHY: ICD-10-CM

## 2019-08-09 DIAGNOSIS — E89.0 POSTABLATIVE HYPOTHYROIDISM: ICD-10-CM

## 2019-08-09 DIAGNOSIS — IMO0002 UNCONTROLLED TYPE 2 DIABETES MELLITUS WITH COMPLICATION, WITHOUT LONG-TERM CURRENT USE OF INSULIN: Primary | ICD-10-CM

## 2019-08-09 DIAGNOSIS — E11.69 HYPERLIPIDEMIA ASSOCIATED WITH TYPE 2 DIABETES MELLITUS (HCC): ICD-10-CM

## 2019-08-09 DIAGNOSIS — E78.5 HYPERLIPIDEMIA ASSOCIATED WITH TYPE 2 DIABETES MELLITUS (HCC): ICD-10-CM

## 2019-08-09 PROCEDURE — 99214 OFFICE O/P EST MOD 30 MIN: CPT | Performed by: INTERNAL MEDICINE

## 2019-08-09 RX ORDER — LEVOTHYROXINE SODIUM 0.15 MG/1
150 TABLET ORAL DAILY
Qty: 90 TABLET | Refills: 1 | Status: SHIPPED | OUTPATIENT
Start: 2019-08-09 | End: 2020-02-06

## 2019-08-09 NOTE — PROGRESS NOTES
69 y.o.    Patient Care Team:  Phil Gilliam MD as PCP - General (Internal Medicine)  Beverly Paredes MD as PCP - Claims Attributed  Argentina Prieto MD as Consulting Physician (Cardiology)    Chief Complaint:    F/U TYPE 2 DIABETES, UNCONTROLLED.  POSTABLATIVE HYPOTHYROIDISM.  HYPOGONADISM MALE. HERE TO DISCUSS LABS  Subjective     HPI   Patient is a 69-year-old white male with a past history of post ablative hypothyroidism and Graves' disease and uncontrolled type 2 diabetes mellitus came for follow-up    Post ablative hypothyroidism  Patient is currently taking levothyroxine 175 mcg daily.  he reports compliance with medication  Denies any side effects  He denies any symptoms of hyper or hypothyroidism  Uncontrolled type 2 diabetes mellitus  Patient's recent hemoglobin A1c 6.8% excellent  Is currently taking Victoza 1.8 mg daily along with metformin 1000 mg twice daily and glimepiride 2 mg daily in the morning  Hypoglycemia  Patient denied any recent episodes of hypoglycemia  Graves' disease  Patient has ophthalmopathy and has regular follow-up with ophthalmologist for double vision  Hypogonadism  Patient was advised to discontinue testosterone injections after the diagnosis of cancer  Patient denied any symptoms of diabetic retinopathy or neuropathy or nephropathy  Abnormal weight gain  Patient is steadily losing weight  Intentional weight loss of 11 pounds in the past 5 months    Interval History    Hypothyroidism post ablative  Patient is compliant with his medication. He denied any side effects.he is currently on 200 micrograms Synthroid daily  Type II diabetes mellitus  Patient is currently taking Victoza 1.8 mg,, metformin 1000 mg twice daily.patient reports that his blood sugars are doing well. Usually averaging around 140; he also started Amaryl one tablet twice daily. He did not take any insulin in the past 3 months.  hypoglycemia  Patient had a few episodes of hypoglycemia especially  in the early morning hours in the late evening hours into the 56 and 61 range  Hypogonadism  patient is currently on testosterone injections 0.75 ml every 2 weeks and reports no side effects.  Last testosterone injection was approximately one week ago Friday  He'll get lab testing done today  Graves' disease with ophthalmopathy  Patient reported that he had significant problems finding an ophthalmologist to believe his symptoms of double vision in the fact that he had Graves' disease. He finally found and ophthalmologist at [facility] Dr Yesi Vazquez who examined him and did a CT scan of the orbit and confirmed that he had a right lateral and left medial rectus enlargement on the CT scan confirming diplopia in the horizontal direction.  Patient does have a regular followup ware inith this ophthalmologist  Hyperlipidemia  Patient is currently on medication and denied any side effects.  Interval history  Patient reported that he just had back surgery approximately 3 weeks ago and currently has a back brace and walking without any Cane   Patient reported that he has been tolerating the testosterone injections 0.5 mL every 2 weeks  He reported most of his blood sugars are less than 150  He reports increasing stress at home from a 94-year-old mother living with them        The following portions of the patient's history were reviewed and updated as appropriate: allergies, current medications, past family history, past medical history, past social history, past surgical history and problem list.    Past Medical History:   Diagnosis Date   • Atrial fibrillation (CMS/HCC)    • CAD (coronary artery disease)     S/p CABG on 02/8/19   • Chronic back pain     Hx Surgery   • Chronic knee pain     S/p Reg Total Knee Replacement   • Depression 07/2009    Trazadone   • Diplopia    • Discoid lupus erythematosus    • Generalized arthritis 1997   • Graves disease 12/2008    Hyperactive thyroid radiation   •  Hyperlipidemia     Controlled w/Meds   • Hypertension 07/2008   • Hypogonadism, male    • Hypothyroidism, postablative    • Lactose intolerance 06/2017   • Left ventricular hypertrophy    • Long-term insulin use (CMS/HCC)    • Lung mass     R Lower Lung   • Metabolic disorder    • NSTEMI (non-ST elevated myocardial infarction) (CMS/HCC)    • PAD (peripheral artery disease) (CMS/HCC) 12/2007   • Right bundle branch block with left anterior fascicular block    • Syncope Hx   • Thyrotoxicosis factitia    • Type 2 diabetes mellitus (CMS/HCC) 1982     Family History   Problem Relation Age of Onset   • Coronary artery disease Other    • Malig Hyperthermia Neg Hx      Social History     Socioeconomic History   • Marital status:      Spouse name: Not on file   • Number of children: Not on file   • Years of education: Not on file   • Highest education level: Not on file   Occupational History   • Occupation: RETIRED   Tobacco Use   • Smoking status: Former Smoker     Types: Cigarettes   • Smokeless tobacco: Never Used   Substance and Sexual Activity   • Alcohol use: No     Comment: Caffeine use   • Drug use: No   • Sexual activity: Defer     Allergies   Allergen Reactions   • Lactose Intolerance (Gi) GI Intolerance     Other reaction(s): GI Intolerance       Current Outpatient Medications:   •  ALPRAZolam (XANAX) 0.5 MG tablet, Take 0.25 mg by mouth 4 (Four) Times a Day., Disp: , Rfl:   •  aspirin 81 MG EC tablet, Take 81 mg by mouth Daily. HOLDING PER MD, Disp: , Rfl:   •  atorvastatin (LIPITOR) 40 MG tablet, TAKE ONE TABLET BY MOUTH DAILY, Disp: 90 tablet, Rfl: 1  •  Cholecalciferol (VITAMIN D3) 2000 units tablet, Take 1 tablet by mouth Daily., Disp: , Rfl:   •  clopidogrel (PLAVIX) 75 MG tablet, Take 75 mg by mouth Daily. HOLDING PER MD, Disp: , Rfl:   •  DULoxetine (CYMBALTA) 30 MG capsule, Take 30 mg by mouth Daily., Disp: , Rfl:   •  DULoxetine (CYMBALTA) 60 MG capsule, Take 60 mg by mouth Every Evening., Disp:  ", Rfl:   •  glimepiride (AMARYL) 2 MG tablet, TAKE ONE TABLET BY MOUTH DAILY AS DIRECTED (Patient taking differently: TAKE ONE-HALF TABLET BY MOUTH TWICE DAILY), Disp: 90 tablet, Rfl: 0  •  glucose blood (KIMBERLY CONTOUR TEST) test strip, Kimberly Contour Test In Vitro Strip; Patient Sig: Kimberly Contour Test In Vitro Strip ; 100; 0; 06-May-2012; Active, Disp: , Rfl:   •  Insulin Pen Needle (NOVOFINE) 32G X 6 MM misc, , Disp: , Rfl:   •  levothyroxine (SYNTHROID, LEVOTHROID) 175 MCG tablet, TAKE ONE TABLET BY MOUTH DAILY, Disp: 90 tablet, Rfl: 0  •  metFORMIN (GLUCOPHAGE) 1000 MG tablet, TAKE ONE TABLET BY MOUTH TWICE A DAY, Disp: 180 tablet, Rfl: 0  •  Multiple Vitamin (MULTI-DAY VITAMINS PO), Take 1 tablet by mouth Daily. HOLD PRIOR TO SURG, Disp: , Rfl:   •  Syringe/Needle, Disp, (B-D SYRINGE/NEEDLE 3CC/22GX1.5) 22G X 1-1/2\" 3 ML misc, , Disp: , Rfl:   •  traZODone (DESYREL) 50 MG tablet, Take 50 mg by mouth Every Night., Disp: , Rfl:   •  VICTOZA 18 MG/3ML solution pen-injector injection, DIAL AND INJECT SUBCUTANEOUSLY 1.8MG DAILY, Disp: 9 mL, Rfl: 2  No current facility-administered medications for this visit.     Facility-Administered Medications Ordered in Other Visits:   •  Chlorhexidine Gluconate Cloth 2 % pads 1 application, 1 application, Topical, Q12H PRN, Bonny Márquez, ANETA        Review of Systems   Constitutional: Positive for fatigue. Negative for appetite change and fever.   Eyes: Negative for visual disturbance.   Respiratory: Negative for shortness of breath.    Cardiovascular: Negative for palpitations and leg swelling.   Gastrointestinal: Negative for abdominal pain and vomiting.   Endocrine: Negative for polydipsia and polyuria.   Musculoskeletal: Negative for joint swelling and neck pain.   Skin: Negative for rash.   Neurological: Negative for weakness and numbness.   Psychiatric/Behavioral: Negative for behavioral problems.   All other systems reviewed and are negative.      Objective " "      Vitals:    08/09/19 0914   BP: 114/68   Pulse: 76   SpO2: 98%   Weight: 83.5 kg (184 lb)   Height: 180.3 cm (71\")     Body mass index is 25.66 kg/m².      Physical Exam   Constitutional: He is oriented to person, place, and time. He appears well-developed and well-nourished.   HENT:   Head: Normocephalic and atraumatic.   Eyes: EOM are normal. Pupils are equal, round, and reactive to light.   Neck: Normal range of motion. Neck supple. No tracheal deviation present. No thyromegaly present.   Cardiovascular: Normal rate, regular rhythm, normal heart sounds and intact distal pulses.   Pulmonary/Chest: Effort normal and breath sounds normal.   Abdominal: Soft. Bowel sounds are normal. He exhibits no distension. There is no tenderness.   Musculoskeletal: Normal range of motion. He exhibits no edema.   Neurological: He is alert and oriented to person, place, and time. He has normal reflexes.   Skin: Skin is warm and dry. No rash noted.   Psychiatric: He has a normal mood and affect. His behavior is normal.   Nursing note and vitals reviewed.    Results Review:     I reviewed the patient's new clinical results.    Medical records reviewed  Summary:      Results Encounter on 07/26/2019   Component Date Value Ref Range Status   • Glucose 07/26/2019 133* 65 - 99 mg/dL Final   • BUN 07/26/2019 18  8 - 23 mg/dL Final   • Creatinine 07/26/2019 0.97  0.76 - 1.27 mg/dL Final   • eGFR Non  Am 07/26/2019 77  >60 mL/min/1.73 Final   • eGFR African Am 07/26/2019 93  >60 mL/min/1.73 Final   • BUN/Creatinine Ratio 07/26/2019 18.6  7.0 - 25.0 Final   • Sodium 07/26/2019 142  136 - 145 mmol/L Final   • Potassium 07/26/2019 4.5  3.5 - 5.2 mmol/L Final   • Chloride 07/26/2019 103  98 - 107 mmol/L Final   • Total CO2 07/26/2019 27.1  22.0 - 29.0 mmol/L Final   • Calcium 07/26/2019 9.3  8.6 - 10.5 mg/dL Final   • Total Protein 07/26/2019 6.9  6.0 - 8.5 g/dL Final   • Albumin 07/26/2019 4.00  3.50 - 5.20 g/dL Final   • Globulin " 07/26/2019 2.9  gm/dL Final   • A/G Ratio 07/26/2019 1.4  g/dL Final   • Total Bilirubin 07/26/2019 0.3  0.2 - 1.2 mg/dL Final   • Alkaline Phosphatase 07/26/2019 78  39 - 117 U/L Final   • AST (SGOT) 07/26/2019 28  1 - 40 U/L Final   • ALT (SGPT) 07/26/2019 30  1 - 41 U/L Final   • Hemoglobin A1C 07/26/2019 6.50* 4.80 - 5.60 % Final    Comment: Hemoglobin A1C Ranges:  Increased Risk for Diabetes  5.7% to 6.4%  Diabetes                     >= 6.5%  Diabetic Goal                < 7.0%     • Free T4 07/26/2019 1.56  0.93 - 1.70 ng/dL Final   • TSH 07/26/2019 0.772  0.270 - 4.200 mIU/mL Final   • Testosterone, Total 07/26/2019 290  264 - 916 ng/dL Final    Comment: Adult male reference interval is based on a population of  healthy nonobese males (BMI <30) between 19 and 39 years old.  Abilio et.al. JCEM 2017,102;6616-4118. PMID: 80631820.     • Testosterone, Free 07/26/2019 4.1* 6.6 - 18.1 pg/mL Final   • Sex Hormone Binding Globulin 07/26/2019 48.5  19.3 - 76.4 nmol/L Final   • Creatinine, Urine 07/26/2019 192.4  Not Estab. mg/dL Final   • Microalbumin, Urine 07/26/2019 13.1  Not Estab. ug/mL Final   • Microalbumin/Creatinine Ratio 07/26/2019 6.8  0.0 - 30.0 mg/g creat Final    Comment:                      Normal:                0.0 -  30.0                       Albuminuria:          31.0 - 300.0                       Clinical albuminuria:       >300.0       Lab Results   Component Value Date    HGBA1C 6.50 (H) 07/26/2019    HGBA1C 7.70 (H) 04/19/2019    HGBA1C 7.42 (H) 02/07/2019     Lab Results   Component Value Date    MICROALBUR 13.1 07/26/2019    CREATININE 0.9 08/05/2019     Imaging Results (most recent)     None          Assessment and Plan:    Diagnoses and all orders for this visit:    Uncontrolled type 2 diabetes mellitus with complication, without long-term current use of insulin (CMS/[facility])    Postablative hypothyroidism    Graves' ophthalmopathy    Hyperinsulinism    Hyperlipidemia associated with type  "2 diabetes mellitus (CMS/[facility])    Other orders  -     levothyroxine (SYNTHROID, LEVOTHROID) 150 MCG tablet; Take 1 tablet by mouth Daily.       #1 uncontrolled type II diabetes mellitus.  #2 post ablative hypothyroidism; stable and no change in the dosage  #3 Graves' disease With ophthalmopathy  #4 hypogonadism- patient is currently on testosterone 0.5 cc injections every 2 weeks  #5 hyperlipidemia  #6 insulin management; not on insulin currently. He is only taking victoza and Amaryl and metformin  #7 hypoglycemia;     Glucometer reviewed  Blood sugars are ranging from   No hypoglycemia noted  Patient has intentional weight loss of 9 pounds  Hemoglobin A1c came down to 6.5% excellent  TSH is also drifting down to 0.7    Due to his heart condition I recommend keeping the TSH closer to 3  I will decrease the dose levothyroxine 150 mcg  Patient will continue Victoza and metformin and Amaryl for now  I advised the patient to consider stopping Amaryl if the blood sugars are consistently below 100 as he continues to lose weight  Patient verbalized understanding    Patient return to follow-up in 3 months     The total time spent  was more than 25 min of which greater than 15 min of time (greater than 50% of the total time)  was spent face to face with the patient counseling and coordination of care on recommended evaluation and treatment options, instructions for management/treatment and /or follow up and importance of compliance with chosen management or treatment options  Curt Brown MD. FACE    08/09/19      EMR Dragon / transcription disclaimer:     \"Dictated utilizing Dragon dictation\".         "

## 2019-09-03 RX ORDER — GLIMEPIRIDE 2 MG/1
TABLET ORAL
Qty: 90 TABLET | Refills: 0 | Status: SHIPPED | OUTPATIENT
Start: 2019-09-03 | End: 2020-03-12

## 2019-09-05 RX ORDER — GLIMEPIRIDE 2 MG/1
TABLET ORAL
Qty: 90 TABLET | Refills: 0 | Status: SHIPPED | OUTPATIENT
Start: 2019-09-05 | End: 2019-11-15 | Stop reason: SDUPTHER

## 2019-10-30 DIAGNOSIS — IMO0002 UNCONTROLLED TYPE 2 DIABETES MELLITUS WITH COMPLICATION, WITHOUT LONG-TERM CURRENT USE OF INSULIN: ICD-10-CM

## 2019-10-31 RX ORDER — LIRAGLUTIDE 6 MG/ML
INJECTION SUBCUTANEOUS
Qty: 9 ML | Refills: 1 | Status: SHIPPED | OUTPATIENT
Start: 2019-10-31 | End: 2020-01-10 | Stop reason: SDUPTHER

## 2019-11-01 ENCOUNTER — LAB (OUTPATIENT)
Dept: ENDOCRINOLOGY | Age: 69
End: 2019-11-01

## 2019-11-01 DIAGNOSIS — E78.5 HYPERLIPIDEMIA ASSOCIATED WITH TYPE 2 DIABETES MELLITUS (HCC): ICD-10-CM

## 2019-11-01 DIAGNOSIS — E05.00 GRAVES' OPHTHALMOPATHY: ICD-10-CM

## 2019-11-01 DIAGNOSIS — E29.1 HYPOGONADISM IN MALE: ICD-10-CM

## 2019-11-01 DIAGNOSIS — IMO0002 UNCONTROLLED TYPE 2 DIABETES MELLITUS WITH COMPLICATION, WITHOUT LONG-TERM CURRENT USE OF INSULIN: ICD-10-CM

## 2019-11-01 DIAGNOSIS — E11.69 HYPERLIPIDEMIA ASSOCIATED WITH TYPE 2 DIABETES MELLITUS (HCC): ICD-10-CM

## 2019-11-01 DIAGNOSIS — IMO0002 UNCONTROLLED TYPE 2 DIABETES MELLITUS WITH COMPLICATION, WITHOUT LONG-TERM CURRENT USE OF INSULIN: Primary | ICD-10-CM

## 2019-11-01 DIAGNOSIS — E89.0 POSTABLATIVE HYPOTHYROIDISM: ICD-10-CM

## 2019-11-02 LAB
ALBUMIN SERPL-MCNC: 4.4 G/DL (ref 3.5–5.2)
ALBUMIN/CREAT UR: 7 MG/G CREAT (ref 0–30)
ALBUMIN/GLOB SERPL: 1.7 G/DL
ALP SERPL-CCNC: 80 U/L (ref 39–117)
ALT SERPL-CCNC: 29 U/L (ref 1–41)
AST SERPL-CCNC: 23 U/L (ref 1–40)
BILIRUB SERPL-MCNC: 0.2 MG/DL (ref 0.2–1.2)
BUN SERPL-MCNC: 13 MG/DL (ref 8–23)
BUN/CREAT SERPL: 11.7 (ref 7–25)
CALCIUM SERPL-MCNC: 9.4 MG/DL (ref 8.6–10.5)
CHLORIDE SERPL-SCNC: 101 MMOL/L (ref 98–107)
CO2 SERPL-SCNC: 29.5 MMOL/L (ref 22–29)
CREAT SERPL-MCNC: 1.11 MG/DL (ref 0.76–1.27)
CREAT UR-MCNC: 195.6 MG/DL
GLOBULIN SER CALC-MCNC: 2.6 GM/DL
GLUCOSE SERPL-MCNC: 115 MG/DL (ref 65–99)
HBA1C MFR BLD: 6.2 % (ref 4.8–5.6)
MICROALBUMIN UR-MCNC: 13.6 UG/ML
POTASSIUM SERPL-SCNC: 4.7 MMOL/L (ref 3.5–5.2)
PROT SERPL-MCNC: 7 G/DL (ref 6–8.5)
SHBG SERPL-SCNC: 47.8 NMOL/L (ref 19.3–76.4)
SODIUM SERPL-SCNC: 142 MMOL/L (ref 136–145)
T4 FREE SERPL-MCNC: 1.43 NG/DL (ref 0.93–1.7)
TESTOST FREE SERPL-MCNC: 7.8 PG/ML (ref 6.6–18.1)
TESTOST SERPL-MCNC: 383 NG/DL (ref 264–916)
TSH SERPL DL<=0.005 MIU/L-ACNC: 1.13 UIU/ML (ref 0.27–4.2)

## 2019-11-15 ENCOUNTER — OFFICE VISIT (OUTPATIENT)
Dept: ENDOCRINOLOGY | Age: 69
End: 2019-11-15

## 2019-11-15 VITALS
BODY MASS INDEX: 26.88 KG/M2 | WEIGHT: 192 LBS | DIASTOLIC BLOOD PRESSURE: 68 MMHG | HEART RATE: 92 BPM | SYSTOLIC BLOOD PRESSURE: 118 MMHG | HEIGHT: 71 IN

## 2019-11-15 DIAGNOSIS — E11.69 HYPERLIPIDEMIA ASSOCIATED WITH TYPE 2 DIABETES MELLITUS (HCC): ICD-10-CM

## 2019-11-15 DIAGNOSIS — E29.1 HYPOGONADISM IN MALE: ICD-10-CM

## 2019-11-15 DIAGNOSIS — E16.1 HYPERINSULINISM: ICD-10-CM

## 2019-11-15 DIAGNOSIS — E78.5 HYPERLIPIDEMIA ASSOCIATED WITH TYPE 2 DIABETES MELLITUS (HCC): ICD-10-CM

## 2019-11-15 DIAGNOSIS — IMO0002 UNCONTROLLED TYPE 2 DIABETES MELLITUS WITH COMPLICATION, WITHOUT LONG-TERM CURRENT USE OF INSULIN: Primary | ICD-10-CM

## 2019-11-15 DIAGNOSIS — E89.0 POSTABLATIVE HYPOTHYROIDISM: ICD-10-CM

## 2019-11-15 DIAGNOSIS — E05.00 GRAVES' OPHTHALMOPATHY: ICD-10-CM

## 2019-11-15 PROCEDURE — 99214 OFFICE O/P EST MOD 30 MIN: CPT | Performed by: INTERNAL MEDICINE

## 2019-11-15 NOTE — PROGRESS NOTES
69 y.o.    Patient Care Team:  Phil Gilliam MD as PCP - General (Internal Medicine)  Beverly Paredes MD as PCP - Claims Attributed  Argentina Prieto MD as Consulting Physician (Cardiology)    Chief Complaint:      F/U TYPE 2 DIABETES, UNCONTROLLED.  POSTABLATIVE HYPOTHYROIDISM.  HYPOGONADISM MALE.   HERE TO DISCUSS LABS     Subjective     HPI   Patient is a 69-year-old white male with a history of postoperative hypothyroidism, Graves' disease and uncontrolled type 2 diabetes mellitus came for follow-up    Post ablative hypothyroidism  Patient is currently taking levothyroxine 150 mcg daily  Reports compliance  Denies side effects  Denies any symptoms of hyper or hypothyroidism  His recent hemoglobin A1c was 6.2%  He is currently taking Victoza 1.8 mg, metformin 1000 mg twice daily, Amaryl 2 mg twice daily  Hypoglycemia  Patient reports occasional episodes of hypoglycemia  Graves disease  Patient has ophthalmopathy and has regular follow-up  Denies any double vision  Hypogonadism   patient was advised to discontinue testosterone as of cancer diagnosis and also heart condition  Patient denies any symptoms of diabetic retinopathy or nephropathy or neuropathy  Abnormal weight gain  Patient gained about 8 pounds since last visit        Interval History    Hypothyroidism post ablative  Patient is compliant with his medication. He denied any side effects.he is currently on 200 micrograms Synthroid daily  Type II diabetes mellitus  Patient is currently taking Victoza 1.8 mg,, metformin 1000 mg twice daily.patient reports that his blood sugars are doing well. Usually averaging around 140; he also started Amaryl one tablet twice daily. He did not take any insulin in the past 3 months.  hypoglycemia  Patient had a few episodes of hypoglycemia especially in the early morning hours in the late evening hours into the 56 and 61 range  Hypogonadism  patient is currently on testosterone injections 0.75 ml every 2  weeks and reports no side effects.  Last testosterone injection was approximately one week ago Friday  He'll get lab testing done today  Graves' disease with ophthalmopathy  Patient reported that he had significant problems finding an ophthalmologist to believe his symptoms of double vision in the fact that he had Graves' disease. He finally found and ophthalmologist at [facility] Dr Yesi Vazquez who examined him and did a CT scan of the orbit and confirmed that he had a right lateral and left medial rectus enlargement on the CT scan confirming diplopia in the horizontal direction.  Patient does have a regular followup ware inith this ophthalmologist  Hyperlipidemia  Patient is currently on medication and denied any side effects.  Interval history  Patient reported that he just had back surgery approximately 3 weeks ago and currently has a back brace and walking without any Cane   Patient reported that he has been tolerating the testosterone injections 0.5 mL every 2 weeks  He reported most of his blood sugars are less than 150  He reports increasing stress at home from a 94-year-old mother living with them     The following portions of the patient's history were reviewed and updated as appropriate: allergies, current medications, past family history, past medical history, past social history, past surgical history and problem list.    Past Medical History:   Diagnosis Date   • Atrial fibrillation (CMS/HCC)    • CAD (coronary artery disease)     S/p CABG on 02/8/19   • Chronic back pain     Hx Surgery   • Chronic knee pain     S/p Reg Total Knee Replacement   • Depression 07/2009    Trazadone   • Diplopia    • Discoid lupus erythematosus    • Generalized arthritis 1997   • Graves disease 12/2008    Hyperactive thyroid radiation   • Hyperlipidemia     Controlled w/Meds   • Hypertension 07/2008   • Hypogonadism, male    • Hypothyroidism, postablative    • Lactose intolerance 06/2017   • Left  ventricular hypertrophy    • Long-term insulin use (CMS/[facility])    • Lung mass     R Lower Lung   • Metabolic disorder    • NSTEMI (non-ST elevated myocardial infarction) (CMS/[facility])    • PAD (peripheral artery disease) (CMS/[facility]) 12/2007   • Right bundle branch block with left anterior fascicular block    • Syncope Hx   • Thyrotoxicosis factitia    • Type 2 diabetes mellitus (CMS/[facility]) 1982     Family History   Problem Relation Age of Onset   • Coronary artery disease Other    • Malig Hyperthermia Neg Hx      Social History     Socioeconomic History   • Marital status:      Spouse name: Not on file   • Number of children: Not on file   • Years of education: Not on file   • Highest education level: Not on file   Occupational History   • Occupation: RETIRED   Tobacco Use   • Smoking status: Former Smoker     Types: Cigarettes   • Smokeless tobacco: Never Used   Substance and Sexual Activity   • Alcohol use: No     Comment: Caffeine use   • Drug use: No   • Sexual activity: Defer     Allergies   Allergen Reactions   • Lactose Intolerance (Gi) GI Intolerance     Other reaction(s): GI Intolerance       Current Outpatient Medications:   •  ALPRAZolam (XANAX) 0.5 MG tablet, Take 0.25 mg by mouth 4 (Four) Times a Day., Disp: , Rfl:   •  aspirin 81 MG EC tablet, Take 81 mg by mouth Daily. HOLDING PER MD, Disp: , Rfl:   •  atorvastatin (LIPITOR) 40 MG tablet, TAKE ONE TABLET BY MOUTH DAILY, Disp: 90 tablet, Rfl: 1  •  Cholecalciferol (VITAMIN D3) 2000 units tablet, Take 1 tablet by mouth Daily., Disp: , Rfl:   •  clopidogrel (PLAVIX) 75 MG tablet, Take 75 mg by mouth Daily. HOLDING PER MD, Disp: , Rfl:   •  DULoxetine (CYMBALTA) 30 MG capsule, Take 30 mg by mouth Daily., Disp: , Rfl:   •  DULoxetine (CYMBALTA) 60 MG capsule, Take 60 mg by mouth Every Evening., Disp: , Rfl:   •  glimepiride (AMARYL) 2 MG tablet, TAKE ONE TABLET BY MOUTH DAILY, Disp: 90 tablet, Rfl: 0  •  glucose blood (KIMBERLY CONTOUR TEST) test strip, Kimberly  "Contour Test In Vitro Strip; Patient Sig: Kimberly Contour Test In Vitro Strip ; 100; 0; 06-May-2012; Active, Disp: , Rfl:   •  Insulin Pen Needle (NOVOFINE) 32G X 6 MM misc, , Disp: , Rfl:   •  levothyroxine (SYNTHROID, LEVOTHROID) 150 MCG tablet, Take 1 tablet by mouth Daily., Disp: 90 tablet, Rfl: 1  •  metFORMIN (GLUCOPHAGE) 1000 MG tablet, Take 1 tablet by mouth 2 (Two) Times a Day., Disp: 180 tablet, Rfl: 1  •  Multiple Vitamin (MULTI-DAY VITAMINS PO), Take 1 tablet by mouth Daily. HOLD PRIOR TO SURG, Disp: , Rfl:   •  traZODone (DESYREL) 50 MG tablet, Take 50 mg by mouth Every Night., Disp: , Rfl:   •  VICTOZA 18 MG/3ML solution pen-injector injection, DIAL AND INJECT SUBCUTANEOUSLY 1.8MG DAILY, Disp: 9 mL, Rfl: 1  No current facility-administered medications for this visit.     Facility-Administered Medications Ordered in Other Visits:   •  Chlorhexidine Gluconate Cloth 2 % pads 1 application, 1 application, Topical, Q12H PRN, Bonny Márquez, ANETA        Review of Systems   Constitutional: Negative for chills, fatigue and fever.   Eyes: Negative for visual disturbance.   Cardiovascular: Negative for chest pain and palpitations.   Gastrointestinal: Negative for abdominal pain, constipation, diarrhea, nausea and vomiting.   Endocrine: Negative for cold intolerance and heat intolerance.   Neurological: Positive for dizziness.   All other systems reviewed and are negative.      Objective       Vitals:    11/15/19 1155   BP: 118/68   Pulse: 92   Weight: 87.1 kg (192 lb)   Height: 180.3 cm (71\")     Body mass index is 26.78 kg/m².      Physical Exam   Constitutional: He is oriented to person, place, and time. He appears well-developed and well-nourished.   HENT:   Head: Normocephalic and atraumatic.   Eyes: EOM are normal. Pupils are equal, round, and reactive to light.   Neck: Normal range of motion. Neck supple. No tracheal deviation present. No thyromegaly present.   Cardiovascular: Normal rate, regular rhythm, " normal heart sounds and intact distal pulses.   Pulmonary/Chest: Effort normal and breath sounds normal.   Abdominal: Soft. Bowel sounds are normal. He exhibits no distension. There is no tenderness.   Musculoskeletal: Normal range of motion. He exhibits no edema.   Neurological: He is alert and oriented to person, place, and time. He has normal reflexes.   Skin: Skin is warm and dry. No rash noted.   Psychiatric: He has a normal mood and affect. His behavior is normal.   Nursing note and vitals reviewed.    Results Review:     I reviewed the patient's new clinical results.    Medical records reviewed  Summary:  Ophthalmology notes reviewed  Patient has no diabetic retinopathy  He was advised to follow-up in 1 year      Lab on 11/01/2019   Component Date Value Ref Range Status   • Creatinine, Urine 11/01/2019 195.6  Not Estab. mg/dL Final   • Microalbumin, Urine 11/01/2019 13.6  Not Estab. ug/mL Final   • Microalbumin/Creatinine Ratio 11/01/2019 7.0  0.0 - 30.0 mg/g creat Final    Comment:                      Normal:                0.0 -  30.0                       Albuminuria:          31.0 - 300.0                       Clinical albuminuria:       >300.0     • Testosterone, Total 11/01/2019 383  264 - 916 ng/dL Final    Comment: Adult male reference interval is based on a population of  healthy nonobese males (BMI <30) between 19 and 39 years old.  Abilio et.al. JCEM 2017,102;5774-6478. PMID: 42725537.     • Testosterone, Free 11/01/2019 7.8  6.6 - 18.1 pg/mL Final   • Sex Hormone Binding Globulin 11/01/2019 47.8  19.3 - 76.4 nmol/L Final   • TSH 11/01/2019 1.130  0.270 - 4.200 uIU/mL Final   • Free T4 11/01/2019 1.43  0.93 - 1.70 ng/dL Final   • Hemoglobin A1C 11/01/2019 6.20* 4.80 - 5.60 % Final    Comment: Hemoglobin A1C Ranges:  Increased Risk for Diabetes  5.7% to 6.4%  Diabetes                     >= 6.5%  Diabetic Goal                < 7.0%     • Glucose 11/01/2019 115* 65 - 99 mg/dL Final   • BUN  11/01/2019 13  8 - 23 mg/dL Final   • Creatinine 11/01/2019 1.11  0.76 - 1.27 mg/dL Final   • eGFR Non  Am 11/01/2019 65  >60 mL/min/1.73 Final   • eGFR African Am 11/01/2019 79  >60 mL/min/1.73 Final   • BUN/Creatinine Ratio 11/01/2019 11.7  7.0 - 25.0 Final   • Sodium 11/01/2019 142  136 - 145 mmol/L Final   • Potassium 11/01/2019 4.7  3.5 - 5.2 mmol/L Final   • Chloride 11/01/2019 101  98 - 107 mmol/L Final   • Total CO2 11/01/2019 29.5* 22.0 - 29.0 mmol/L Final   • Calcium 11/01/2019 9.4  8.6 - 10.5 mg/dL Final   • Total Protein 11/01/2019 7.0  6.0 - 8.5 g/dL Final   • Albumin 11/01/2019 4.40  3.50 - 5.20 g/dL Final   • Globulin 11/01/2019 2.6  gm/dL Final   • A/G Ratio 11/01/2019 1.7  g/dL Final   • Total Bilirubin 11/01/2019 0.2  0.2 - 1.2 mg/dL Final   • Alkaline Phosphatase 11/01/2019 80  39 - 117 U/L Final   • AST (SGOT) 11/01/2019 23  1 - 40 U/L Final   • ALT (SGPT) 11/01/2019 29  1 - 41 U/L Final     Lab Results   Component Value Date    HGBA1C 6.20 (H) 11/01/2019    HGBA1C 6.50 (H) 07/26/2019    HGBA1C 7.70 (H) 04/19/2019     Lab Results   Component Value Date    MICROALBUR 13.6 11/01/2019    CREATININE 1.11 11/01/2019     Imaging Results (Most Recent)     None                Assessment and Plan:    Spencer was seen today for diabetes and hypothyroidism.    Diagnoses and all orders for this visit:    Uncontrolled type 2 diabetes mellitus with complication, without long-term current use of insulin (CMS/HCC)    Postablative hypothyroidism    Graves' ophthalmopathy    Hyperlipidemia associated with type 2 diabetes mellitus (CMS/HCC)    Hypogonadism in male    Hyperinsulinism    Other orders  -     metFORMIN (GLUCOPHAGE) 1000 MG tablet; Take 1 tablet by mouth 2 (Two) Times a Day.        Uncontrolled type 2 diabetes mellitus with complication, without long-term current use of insulin (CMS/[facility])  Patient did not bring his glucometer today  He reports that majority of the blood sugars are less than 150  He  "is currently taking Victoza, Amaryl, metformin  He has not had any significant hypoglycemia so he continued Amaryl  His hemoglobin A1c 6.2%  He definitely has risk for hypoglycemia and I advised the patient of the same    Postablative hypothyroidism  Patient is currently taking levothyroxine 150 mcg daily.  TSH is 1.1 and free T4 is 1.4  Graves' ophthalmopathy  Has regular follow-up with ophthalmologist  Hyperinsulinism     Hyperlipidemia associated with type 2 diabetes mellitus (CMS/HCC)  Hypogonadism  Testosterone was discontinued due to heart condition and his age  Patient's recent testosterone level is actually higher than before     Patient return to follow-up in 3 months  Curt Brown MD. FACE    11/16/19      EMR Dragon / transcription disclaimer:     \"Dictated utilizing Dragon dictation\".         "

## 2019-12-11 RX ORDER — ATORVASTATIN CALCIUM 40 MG/1
TABLET, FILM COATED ORAL
Qty: 90 TABLET | Refills: 0 | Status: SHIPPED | OUTPATIENT
Start: 2019-12-11 | End: 2020-03-12

## 2020-01-10 DIAGNOSIS — IMO0002 UNCONTROLLED TYPE 2 DIABETES MELLITUS WITH COMPLICATION, WITHOUT LONG-TERM CURRENT USE OF INSULIN: ICD-10-CM

## 2020-01-13 DIAGNOSIS — IMO0002 UNCONTROLLED TYPE 2 DIABETES MELLITUS WITH COMPLICATION, WITHOUT LONG-TERM CURRENT USE OF INSULIN: ICD-10-CM

## 2020-01-13 NOTE — TELEPHONE ENCOUNTER
Patient left voice mail stating that medication was denied    Resent victoza to [facility] pharmacy

## 2020-02-06 RX ORDER — LEVOTHYROXINE SODIUM 0.15 MG/1
TABLET ORAL
Qty: 90 TABLET | Refills: 0 | Status: SHIPPED | OUTPATIENT
Start: 2020-02-06 | End: 2020-05-01

## 2020-02-14 ENCOUNTER — OFFICE VISIT (OUTPATIENT)
Dept: CARDIOLOGY | Facility: CLINIC | Age: 70
End: 2020-02-14

## 2020-02-14 VITALS
HEART RATE: 61 BPM | DIASTOLIC BLOOD PRESSURE: 60 MMHG | BODY MASS INDEX: 27.97 KG/M2 | SYSTOLIC BLOOD PRESSURE: 106 MMHG | WEIGHT: 199.8 LBS | HEIGHT: 71 IN

## 2020-02-14 DIAGNOSIS — I48.0 PAF (PAROXYSMAL ATRIAL FIBRILLATION) (HCC): ICD-10-CM

## 2020-02-14 DIAGNOSIS — I73.9 PERIPHERAL VASCULAR DISEASE (HCC): ICD-10-CM

## 2020-02-14 DIAGNOSIS — E78.5 HYPERLIPIDEMIA ASSOCIATED WITH TYPE 2 DIABETES MELLITUS (HCC): ICD-10-CM

## 2020-02-14 DIAGNOSIS — I25.10 CORONARY ARTERY DISEASE INVOLVING NATIVE CORONARY ARTERY OF NATIVE HEART WITHOUT ANGINA PECTORIS: Primary | ICD-10-CM

## 2020-02-14 DIAGNOSIS — I10 ESSENTIAL HYPERTENSION: ICD-10-CM

## 2020-02-14 DIAGNOSIS — E11.69 HYPERLIPIDEMIA ASSOCIATED WITH TYPE 2 DIABETES MELLITUS (HCC): ICD-10-CM

## 2020-02-14 PROBLEM — Z95.1 S/P CABG X 3: Status: RESOLVED | Noted: 2019-03-29 | Resolved: 2020-02-14

## 2020-02-14 PROCEDURE — 99214 OFFICE O/P EST MOD 30 MIN: CPT | Performed by: NURSE PRACTITIONER

## 2020-02-14 PROCEDURE — 93000 ELECTROCARDIOGRAM COMPLETE: CPT | Performed by: NURSE PRACTITIONER

## 2020-02-14 NOTE — PROGRESS NOTES
Date of Office Visit: 2020  Encounter Provider: ANETA Marquez  Place of Service: [facility] CARDIOLOGY  Patient Name: Spencer Sinha  :1950  Primary Cardiologist: Dr. Argentina Prieto    Chief Complaint   Patient presents with   • Coronary Artery Disease   • Follow-up   :     Dear Dr. Phil Gilliam,     HPI: Spencer Sinha is a pleasant 69 y.o. male who presents today for a cardiac follow up visit.     He has been diagnosed with type 2 diabetes mellitus, Graves' disease, hypertension, and hyperlipidemia.  He has a known history of atrial fibrillation due to thyroid toxicosis.  When his thyroid was treated his atrial fibrillation resolved.      In , he had an abnormal nuclear stress test which showed anterior jeffry-infarct ischemia.  Cardiac catheterization in  showed severe LAD disease with good collateralization and medical treatment was recommended.  He has a known history of peripheral arterial disease and status post aortobifemoral bypass by Dr. Solo in .  He was a previous cigarette smoker and quit in .    In , he had a CT scan recently which showed extensive coronary artery calcification and he was experiencing mild exertional dyspnea.  A nuclear stress test was obtained which showed a medium sized area of moderate to severe ischemia in the inferior apical distribution.  He underwent cardiac catheterization 19 which showed the following: Normal left main, occluded LAD with left to left collaterals, 50% proximal and mid circumflex, 90% ostial RCA, 90% proximal and distal RCA.  On 19 he underwent coronary artery bypass graft surgery x3 with LIMA to LAD, SVG to OM, and SVG to PDA by Dr. Crawford.  Dr. Argentina Prieto consulted on him during that hospitalization.  He had a carotid duplex 19 which showed right and left internal carotid artery plaque bilaterally with no significant stenosis.  He had a brief episode of  postoperative atrial fibrillation and converted to normal sinus rhythm.  He was placed on a short course of amiodarone which was discontinued.    He presents today for follow-up visit.  He denies chest pain, shortness of air, PND, orthopnea, cough, edema, dizziness, bleeding, palpitations, or syncope.  His blood pressure and heart rate are both normal today.  He currently is not exercising because of the cold weather.  He plans to resume his exercise when it warms up.    I reviewed his last blood work from 1/15/2020: CBC normal and CMP showed sodium of 136, potassium 5.3, and glucose of 256.  In November 2019 his TSH was normal.  His last lipid panel was completed 2/7/2019 which included total cholesterol of 132, triglycerides 193, HDL 37, and LDL 56.      Past Medical History:   Diagnosis Date   • Atrial fibrillation (CMS/[facility])    • CAD (coronary artery disease)     S/p CABG on 02/8/19   • Chronic back pain     Hx Surgery   • Chronic knee pain     S/p Reg Total Knee Replacement   • Depression 07/2009    Trazadone   • Diplopia    • Discoid lupus erythematosus    • Generalized arthritis 1997   • Graves disease 12/2008    Hyperactive thyroid radiation   • Hyperlipidemia     Controlled w/Meds   • Hypertension 07/2008   • Hypogonadism, male    • Hypothyroidism, postablative    • Lactose intolerance 06/2017   • Left ventricular hypertrophy    • Long-term insulin use (CMS/[facility])    • Lung mass     R Lower Lung   • Metabolic disorder    • NSTEMI (non-ST elevated myocardial infarction) (CMS/[facility])    • PAD (peripheral artery disease) (CMS/[facility]) 12/2007   • Right bundle branch block with left anterior fascicular block    • Syncope Hx   • Thyrotoxicosis factitia    • Type 2 diabetes mellitus (CMS/[facility]) 1982       Past Surgical History:   Procedure Laterality Date   • ABDOMINAL AORTA STENT Bilateral     Using Vein-Aortofemoral   • BACK SURGERY     • CARDIAC CATHETERIZATION N/A 1/31/2019    Procedure: Coronary angiography;  Surgeon:  Hradik Silverman MD;  Location: [facility] CATH INVASIVE LOCATION;  Service: Cardiovascular   • CARDIAC CATHETERIZATION N/A 2019    Procedure: Left Heart Cath;  Surgeon: Hardik Silverman MD;  [facility] CATH [facility]VAS[facility];  Service: Cardiovascular   • CARDIAC CATHETERIZATION N/A 2019    Procedure: Left ventriculography;  Surgeon: Hardik Silverman MD;  Location: [facility] CATH INVASIVE LOCATION;  Service: Cardiovascular   • CATARACT EXTRACTION     • COLONOSCOPY     • CORONARY ARTERY BYPASS GRAFT N/A 2019    Procedure: THANG STERNOTOMY CORONARY ARTERY BYPASS GRAFT TIMES 3 USING LEFT INTERNAL MAMMARY ARTERY AND LEFT GREATER SAPHENOUS VEIN GRAFT PER ENDOSCOPIC VEIN GRAFT AND PRP.;  Surgeon: Nathaniel Crawford MD;  Location: [facility] MAIN OR;  Service: Cardiothoracic   • LUMBAR LAMINECTOMY  10/2015    And fusion L4/L5   • LUNG BIOPSY      Right lung mass   • OTHER SURGICAL HISTORY      Catheter Ablation   • REPLACEMENT TOTAL KNEE BILATERAL      10/2010 and 2010   • TONSILLECTOMY AND ADENOIDECTOMY         Social History     Socioeconomic History   • Marital status:      Spouse name: Not on file   • Number of children: Not on file   • Years of education: Not on file   • Highest education level: Not on file   Occupational History   • Occupation: RETIRED   Tobacco Use   • Smoking status: Former Smoker     Types: Cigarettes     Last attempt to quit:      Years since quittin.1   • Smokeless tobacco: Never Used   Substance and Sexual Activity   • Alcohol use: No     Comment: Caffeine use: 2-3 cups daily   • Drug use: No   • Sexual activity: Defer       Family History   Problem Relation Age of Onset   • Coronary artery disease Other    • Malig Hyperthermia Neg Hx        The following portion of the patient's history were reviewed and updated as appropriate: past medical history, past surgical history, past social history, past family history, allergies, current medications, and problem  list.    Review of Systems   Constitution: Negative for chills, diaphoresis, fever, malaise/fatigue, night sweats, weight gain and weight loss.   HENT: Negative for hearing loss, nosebleeds, sore throat and tinnitus.    Eyes: Negative for blurred vision, double vision, pain and visual disturbance.   Cardiovascular: Positive for dyspnea on exertion. Negative for chest pain, claudication, cyanosis, irregular heartbeat, leg swelling, near-syncope, orthopnea, palpitations, paroxysmal nocturnal dyspnea and syncope.   Respiratory: Negative for cough, hemoptysis, shortness of breath, snoring and wheezing.    Endocrine: Negative for cold intolerance, heat intolerance and polyuria.   Hematologic/Lymphatic: Negative for bleeding problem. Does not bruise/bleed easily.   Skin: Negative for color change, dry skin, flushing and itching.   Musculoskeletal: Negative for falls, joint pain, joint swelling, muscle cramps, muscle weakness and myalgias.   Gastrointestinal: Negative for abdominal pain, constipation, heartburn, melena, nausea and vomiting.   Genitourinary: Negative for dysuria and hematuria.   Neurological: Positive for dizziness. Negative for excessive daytime sleepiness, light-headedness, loss of balance, numbness, paresthesias, seizures and vertigo.   Psychiatric/Behavioral: Negative for altered mental status, depression, memory loss and substance abuse. The patient is nervous/anxious. The patient does not have insomnia.    Allergic/Immunologic: Negative for environmental allergies.       Allergies   Allergen Reactions   • Lactose Intolerance (Gi) GI Intolerance     Other reaction(s): GI Intolerance         Current Outpatient Medications:   •  ALPRAZolam (XANAX) 0.5 MG tablet, Take 0.25 mg by mouth 2 (Two) Times a Day., Disp: , Rfl:   •  aspirin 81 MG EC tablet, Take 81 mg by mouth Daily. HOLDING PER MD, Disp: , Rfl:   •  atorvastatin (LIPITOR) 40 MG tablet, Take 1 tablet by mouth Daily., Disp: 90 tablet, Rfl: 2  •   "Cholecalciferol (VITAMIN D3) 2000 units tablet, Take 1 tablet by mouth Daily., Disp: , Rfl:   •  clopidogrel (PLAVIX) 75 MG tablet, Take 75 mg by mouth Daily. HOLDING PER MD, Disp: , Rfl:   •  DULoxetine (CYMBALTA) 30 MG capsule, Take 30 mg by mouth Daily., Disp: , Rfl:   •  DULoxetine (CYMBALTA) 60 MG capsule, Take 60 mg by mouth Every Evening., Disp: , Rfl:   •  glimepiride (AMARYL) 2 MG tablet, TAKE ONE TABLET BY MOUTH DAILY AS DIRECTED (Patient taking differently: TAKE ONE 0.5MG TABLET BY MOUTH DAILY AS DIRECTED), Disp: 90 tablet, Rfl: 0  •  glucose blood (KIMBERLY CONTOUR TEST) test strip, Kimberly Contour Test In Vitro Strip; Patient Sig: Kimberly Contour Test In Vitro Strip ; 100; 0; 06-May-2012; Active, Disp: , Rfl:   •  Insulin Pen Needle (NOVOFINE) 32G X 6 MM misc, , Disp: , Rfl:   •  levothyroxine (SYNTHROID, LEVOTHROID) 175 MCG tablet, Take 175 mcg by mouth Daily., Disp: , Rfl:   •  metFORMIN (GLUCOPHAGE) 1000 MG tablet, TAKE ONE TABLET BY MOUTH TWICE A DAY, Disp: 180 tablet, Rfl: 0  •  metoprolol tartrate (LOPRESSOR) 50 MG tablet, Take 1 tablet by mouth Every 12 (Twelve) Hours for 90 days., Disp: 60 tablet, Rfl: 2  •  Multiple Vitamin (MULTI-DAY VITAMINS PO), Take 1 tablet by mouth Daily. HOLD PRIOR TO SURG, Disp: , Rfl:   •  Syringe/Needle, Disp, (B-D SYRINGE/NEEDLE 3CC/22GX1.5) 22G X 1-1/2\" 3 ML misc, , Disp: , Rfl:   •  Testosterone Cypionate 200 MG/ML kit, Inject  into the appropriate muscle as directed by prescriber Every 10 (Ten) Days., Disp: , Rfl:   •  traZODone (DESYREL) 50 MG tablet, Take 50 mg by mouth Every Night., Disp: , Rfl:   •  VICTOZA 18 MG/3ML solution pen-injector injection, DIAL AND INJECT SUBCUTANEOUSLY 1.8MG DAILY, Disp: 9 mL, Rfl: 3  •  Liraglutide (VICTOZA) 18 MG/3ML solution pen-injector injection, Inject 1.8 mg under the skin into the appropriate area as directed Daily., Disp: , Rfl:           Objective:     Vitals:    02/14/20 1000 02/14/20 1009   BP: 110/60 106/60   BP Location: Left " "arm Right arm   Pulse: 61    Weight: 90.6 kg (199 lb 12.8 oz)    Height: 180.3 cm (71\")      Body mass index is 27.87 kg/m².    PHYSICAL EXAM:    Vitals Reviewed.   General Appearance: No acute distress, well developed and well nourished.   Eyes: Conjunctiva and lids: No erythema, swelling, or discharge. Sclera non-icteric.   HENT: Atraumatic, normocephalic. External eyes, ears, and nose normal. No hearing loss noted. Mucous membranes normal. Lips not cyanotic. Neck supple with no tenderness.  Respiratory: No signs of respiratory distress. Respiration rhythm and depth normal.   Clear to auscultation. No rales, crackles, rhonchi, or wheezing auscultated.   Cardiovascular:  Jugular Venous Pressure: Normal  Heart Rate and Rhythm: Normal, Heart Sounds: Normal S1 and S2. No S3 or S4 noted.  Murmurs: No murmurs noted. No rubs, thrills, or gallops.   Chest incision well-healed.  Lower Extremities: No edema noted.  Gastrointestinal:  Abdomen soft, non-distended, non-tender. Normal bowel sounds. No hepatomegaly.   Musculoskeletal: Normal movement of extremities  Skin and Nails: General appearance normal. No pallor, cyanosis, diaphoresis. Skin temperature normal. No clubbing of fingernails.  Yellow bruise on right upper arm.  Psychiatric: Patient alert and oriented to person, place, and time. Speech and behavior appropriate. Normal mood and affect.       ECG 12 Lead  Date/Time: 2/14/2020 10:03 AM  Performed by: Michelle Wen APRN  Authorized by: Michelle Wen APRN   Comparison: compared with previous ECG from 8/7/2019  Similar to previous ECG  Rhythm: sinus rhythm  Rate: normal  BPM: 61  Conduction: left anterior fascicular block  Q waves: V1, V2, V3 and V4    ST Segments: ST segments normal  T inversion: V5, V6 and V4  QRS axis: normal  Other findings: poor R wave progression    Clinical impression: abnormal EKG              Assessment:       Diagnosis Plan   1. Coronary artery disease involving native coronary " artery of native heart without angina pectoris     2. Essential hypertension     3. Hyperlipidemia associated with type 2 diabetes mellitus (CMS/[facility])     4. PAF (paroxysmal atrial fibrillation) (CMS/[facility])     5. Peripheral vascular disease (CMS/[facility])            Plan:       1.  Coronary Artery Disease/CABG: He status post CABG x3 in 2019.  Continue aspirin, atorvastatin, and clopidogrel.  Denies angina.  I have encouraged him to restart his exercise regimen.    Coronary Artery Disease  Assessment  • The patient has no angina    Plan  • Lifestyle modifications discussed include adhering to a heart healthy diet, maintenance of a healthy weight, medication compliance, regular exercise and regular monitoring of cholesterol and blood pressure    Subjective - Objective  • There is a history of previous coronary artery bypass graft  • Current antiplatelet therapy includes aspirin 81 mg and clopidogrel 75 mg      2.  Hypertension: Blood pressure is well controlled today.    3.  Hyperlipidemia: He remains on atorvastatin with LDL goal less than 70.  He is due for lipid panel and plans to have that done at his endocrinology office.    4.  Postoperative Atrial Fibrillation: No recurrence documented since open heart surgery.    5.  Peripheral Vascular Disease: Remains on aspirin, clopidogrel, and atorvastatin.  Followed by Dr. Gisela Hutchison.     6.  I recommend follow-up with Dr. Argentina Prieto in 6 months, unless otherwise needed sooner.    As always, it has been a pleasure to participate in your patient's care. Thank you.       Sincerely,         ANETA Liu        **Dragon Disclaimer:**  Much of this encounter note is an electronic transcription/translation of spoken language to printed text. The electronic translation of spoken language may permit erroneous, or at times, nonsensical words or phrases to be inadvertently transcribed. Although I have reviewed the note for such errors, some may still exist.

## 2020-03-12 RX ORDER — GLIMEPIRIDE 2 MG/1
TABLET ORAL
Qty: 90 TABLET | Refills: 0 | Status: SHIPPED | OUTPATIENT
Start: 2020-03-12 | End: 2020-06-10

## 2020-03-12 RX ORDER — ATORVASTATIN CALCIUM 40 MG/1
TABLET, FILM COATED ORAL
Qty: 90 TABLET | Refills: 0 | Status: SHIPPED | OUTPATIENT
Start: 2020-03-12 | End: 2020-06-10

## 2020-04-03 DIAGNOSIS — IMO0002 UNCONTROLLED TYPE 2 DIABETES MELLITUS WITH COMPLICATION, WITHOUT LONG-TERM CURRENT USE OF INSULIN: ICD-10-CM

## 2020-05-01 RX ORDER — LEVOTHYROXINE SODIUM 0.15 MG/1
TABLET ORAL
Qty: 90 TABLET | Refills: 0 | Status: SHIPPED | OUTPATIENT
Start: 2020-05-01 | End: 2020-06-19 | Stop reason: SDUPTHER

## 2020-05-22 DIAGNOSIS — I25.10 CHRONIC CORONARY ARTERY DISEASE: ICD-10-CM

## 2020-05-22 DIAGNOSIS — E16.1 HYPERINSULINISM: ICD-10-CM

## 2020-05-22 DIAGNOSIS — E89.0 POSTABLATIVE HYPOTHYROIDISM: Primary | ICD-10-CM

## 2020-05-22 DIAGNOSIS — IMO0002 UNCONTROLLED TYPE 2 DIABETES MELLITUS WITH COMPLICATION, WITHOUT LONG-TERM CURRENT USE OF INSULIN: ICD-10-CM

## 2020-05-22 DIAGNOSIS — E29.1 HYPOGONADISM IN MALE: ICD-10-CM

## 2020-05-22 DIAGNOSIS — E78.2 MIXED HYPERLIPIDEMIA: ICD-10-CM

## 2020-06-10 RX ORDER — GLIMEPIRIDE 2 MG/1
TABLET ORAL
Qty: 90 TABLET | Refills: 0 | Status: SHIPPED | OUTPATIENT
Start: 2020-06-10 | End: 2020-06-11

## 2020-06-10 RX ORDER — ATORVASTATIN CALCIUM 40 MG/1
TABLET, FILM COATED ORAL
Qty: 90 TABLET | Refills: 0 | Status: SHIPPED | OUTPATIENT
Start: 2020-06-10 | End: 2020-06-11

## 2020-06-11 RX ORDER — GLIMEPIRIDE 2 MG/1
TABLET ORAL
Qty: 90 TABLET | Refills: 0 | Status: SHIPPED | OUTPATIENT
Start: 2020-06-11 | End: 2020-06-19 | Stop reason: SDUPTHER

## 2020-06-11 RX ORDER — ATORVASTATIN CALCIUM 40 MG/1
TABLET, FILM COATED ORAL
Qty: 90 TABLET | Refills: 0 | Status: SHIPPED | OUTPATIENT
Start: 2020-06-11

## 2020-06-16 LAB
ALBUMIN SERPL-MCNC: 4.1 G/DL (ref 3.5–5.2)
ALBUMIN/CREAT UR: 6 MG/G CREAT (ref 0–29)
ALBUMIN/GLOB SERPL: 1.4 G/DL
ALP SERPL-CCNC: 74 U/L (ref 39–117)
ALT SERPL-CCNC: 32 U/L (ref 1–41)
AST SERPL-CCNC: 24 U/L (ref 1–40)
BILIRUB SERPL-MCNC: 0.6 MG/DL (ref 0.2–1.2)
BUN SERPL-MCNC: 20 MG/DL (ref 8–23)
BUN/CREAT SERPL: 17.5 (ref 7–25)
CALCIUM SERPL-MCNC: 9.7 MG/DL (ref 8.6–10.5)
CHLORIDE SERPL-SCNC: 104 MMOL/L (ref 98–107)
CHOLEST SERPL-MCNC: 162 MG/DL (ref 0–200)
CO2 SERPL-SCNC: 24.5 MMOL/L (ref 22–29)
CREAT SERPL-MCNC: 1.14 MG/DL (ref 0.76–1.27)
CREAT UR-MCNC: 105.3 MG/DL
GLOBULIN SER CALC-MCNC: 2.9 GM/DL
GLUCOSE SERPL-MCNC: 177 MG/DL (ref 65–99)
HBA1C MFR BLD: 6.8 % (ref 4.8–5.6)
HDLC SERPL-MCNC: 42 MG/DL (ref 40–60)
INTERPRETATION: NORMAL
LDLC SERPL CALC-MCNC: 93 MG/DL (ref 0–100)
Lab: NORMAL
MICROALBUMIN UR-MCNC: 6 UG/ML
POTASSIUM SERPL-SCNC: 5.1 MMOL/L (ref 3.5–5.2)
PROT SERPL-MCNC: 7 G/DL (ref 6–8.5)
SHBG SERPL-SCNC: 37.9 NMOL/L (ref 19.3–76.4)
SODIUM SERPL-SCNC: 139 MMOL/L (ref 136–145)
T4 FREE SERPL-MCNC: 1.06 NG/DL (ref 0.93–1.7)
TESTOST FREE SERPL-MCNC: 6.4 PG/ML (ref 6.6–18.1)
TESTOST SERPL-MCNC: 318 NG/DL (ref 264–916)
TRIGL SERPL-MCNC: 135 MG/DL (ref 0–150)
TSH SERPL DL<=0.005 MIU/L-ACNC: 3.34 UIU/ML (ref 0.27–4.2)
VLDLC SERPL CALC-MCNC: 27 MG/DL

## 2020-06-19 ENCOUNTER — RESULTS ENCOUNTER (OUTPATIENT)
Dept: ENDOCRINOLOGY | Age: 70
End: 2020-06-19

## 2020-06-19 ENCOUNTER — OFFICE VISIT (OUTPATIENT)
Dept: ENDOCRINOLOGY | Age: 70
End: 2020-06-19

## 2020-06-19 VITALS
RESPIRATION RATE: 20 BRPM | DIASTOLIC BLOOD PRESSURE: 70 MMHG | BODY MASS INDEX: 26.91 KG/M2 | HEART RATE: 82 BPM | SYSTOLIC BLOOD PRESSURE: 142 MMHG | WEIGHT: 192.2 LBS | HEIGHT: 71 IN | OXYGEN SATURATION: 98 %

## 2020-06-19 DIAGNOSIS — E11.69 HYPERLIPIDEMIA ASSOCIATED WITH TYPE 2 DIABETES MELLITUS (HCC): ICD-10-CM

## 2020-06-19 DIAGNOSIS — E05.00 GRAVES DISEASE: ICD-10-CM

## 2020-06-19 DIAGNOSIS — E78.5 HYPERLIPIDEMIA ASSOCIATED WITH TYPE 2 DIABETES MELLITUS (HCC): ICD-10-CM

## 2020-06-19 DIAGNOSIS — IMO0002 UNCONTROLLED TYPE 2 DIABETES MELLITUS WITH COMPLICATION, WITHOUT LONG-TERM CURRENT USE OF INSULIN: ICD-10-CM

## 2020-06-19 DIAGNOSIS — E16.1 HYPERINSULINISM: ICD-10-CM

## 2020-06-19 DIAGNOSIS — E05.00 GRAVES' OPHTHALMOPATHY: ICD-10-CM

## 2020-06-19 DIAGNOSIS — E89.0 POSTABLATIVE HYPOTHYROIDISM: ICD-10-CM

## 2020-06-19 DIAGNOSIS — E89.0 POSTABLATIVE HYPOTHYROIDISM: Primary | ICD-10-CM

## 2020-06-19 PROCEDURE — 99214 OFFICE O/P EST MOD 30 MIN: CPT | Performed by: INTERNAL MEDICINE

## 2020-06-19 RX ORDER — FLUTICASONE PROPIONATE 50 MCG
SPRAY, SUSPENSION (ML) NASAL
COMMUNITY
Start: 2020-06-17

## 2020-06-19 RX ORDER — ALBUTEROL SULFATE 90 UG/1
AEROSOL, METERED RESPIRATORY (INHALATION)
COMMUNITY
Start: 2020-06-17

## 2020-06-19 RX ORDER — LEVOTHYROXINE SODIUM 0.15 MG/1
150 TABLET ORAL DAILY
Qty: 90 TABLET | Refills: 2 | Status: SHIPPED | OUTPATIENT
Start: 2020-06-19

## 2020-06-19 RX ORDER — GLIMEPIRIDE 2 MG/1
2 TABLET ORAL DAILY
Qty: 90 TABLET | Refills: 2 | Status: SHIPPED | OUTPATIENT
Start: 2020-06-19 | End: 2022-01-06 | Stop reason: ALTCHOICE

## 2020-06-19 NOTE — PROGRESS NOTES
70 y.o.    Patient Care Team:  Godfrey Gaspar MD as PCP - General (Internal Medicine)  Argentina Prieto MD as Consulting Physician (Cardiology)    Chief Complaint:  PT HERE FOR FUP HYPOTHYROID DZ DIABETES TYPE 2 LAB REVIEW MED EVAL HYPOGONADISIM     Subjective     HPI   Patient is a 70-year-old white male with a history of postoperative hypothyroidism, Graves' disease, uncontrolled type 2 diabetes mellitus came for follow-up    Postoperative hypothyroidism  Currently taking levothyroxine 150 mcg daily.  Reports compliance and denies side effects  He denies any symptoms of hyper or hypothyroidism  Uncontrolled type 2 diabetes mellitus  Currently taking Victoza 1.8 mg, Amaryl 2 mg twice daily  His recent globin A1c 6.8%  Hypoglycemia  Patient denies any recent episodes  Graves' disease  Patient has ophthalmopathy but denies any double vision or blurred vision or pretibial myxedema  Hypogonadism  Patient is currently not taking testosterone due to cancer diagnosis in the recent past  Abnormal weight gain  Patient managed to lose weight and is able to keep it down and is feeling much better      Interval History      The following portions of the patient's history were reviewed and updated as appropriate: allergies, current medications, past family history, past medical history, past social history, past surgical history and problem list.    Past Medical History:   Diagnosis Date   • Atrial fibrillation (CMS/HCC)    • CAD (coronary artery disease)     S/p CABG on 02/8/19   • Chronic back pain     Hx Surgery   • Chronic knee pain     S/p Reg Total Knee Replacement   • Depression 07/2009    Trazadone   • Diplopia    • Discoid lupus erythematosus    • Generalized arthritis 1997   • Graves disease 12/2008    Hyperactive thyroid radiation   • Hyperlipidemia     Controlled w/Meds   • Hypertension 07/2008   • Hypogonadism, male    • Hypothyroidism, postablative    • Lactose intolerance 06/2017   • Left ventricular  hypertrophy    • Long-term insulin use (CMS/[facility])    • Lung mass     R Lower Lung   • Metabolic disorder    • NSTEMI (non-ST elevated myocardial infarction) (CMS/[facility])    • PAD (peripheral artery disease) (CMS/[facility]) 2007   • Right bundle branch block with left anterior fascicular block    • Syncope Hx   • Thyrotoxicosis factitia    • Type 2 diabetes mellitus (CMS/[facility]) 1982     Family History   Problem Relation Age of Onset   • Coronary artery disease Other    • Malig Hyperthermia Neg Hx      Social History     Socioeconomic History   • Marital status:      Spouse name: Not on file   • Number of children: Not on file   • Years of education: Not on file   • Highest education level: Not on file   Occupational History   • Occupation: RETIRED   Tobacco Use   • Smoking status: Former Smoker     Types: Cigarettes     Last attempt to quit: 2014     Years since quittin.4   • Smokeless tobacco: Never Used   Substance and Sexual Activity   • Alcohol use: No     Comment: Caffeine use: 2-3 cups daily   • Drug use: No   • Sexual activity: Defer     Allergies   Allergen Reactions   • Lactose Intolerance (Gi) GI Intolerance     Other reaction(s): GI Intolerance       Current Outpatient Medications:   •  albuterol sulfate  (90 Base) MCG/ACT inhaler, , Disp: , Rfl:   •  ALPRAZolam (XANAX) 0.5 MG tablet, Take 0.25 mg by mouth 4 (Four) Times a Day., Disp: , Rfl:   •  aspirin 81 MG EC tablet, Take 81 mg by mouth Daily. HOLDING PER MD, Disp: , Rfl:   •  atorvastatin (LIPITOR) 40 MG tablet, TAKE ONE TABLET BY MOUTH DAILY, Disp: 90 tablet, Rfl: 0  •  Cholecalciferol (VITAMIN D3) 2000 units tablet, Take 1 tablet by mouth Daily., Disp: , Rfl:   •  clopidogrel (PLAVIX) 75 MG tablet, Take 75 mg by mouth Daily. HOLDING PER MD, Disp: , Rfl:   •  DULoxetine (CYMBALTA) 30 MG capsule, Take 30 mg by mouth Daily., Disp: , Rfl:   •  DULoxetine (CYMBALTA) 60 MG capsule, Take 60 mg by mouth Every Evening., Disp: , Rfl:   •  fluticasone  (FLONASE) 50 MCG/ACT nasal spray, , Disp: , Rfl:   •  glimepiride (AMARYL) 2 MG tablet, Take 1 tablet by mouth Daily., Disp: 90 tablet, Rfl: 2  •  glucose blood (KIMBERLY CONTOUR TEST) test strip, Kimberly Contour Test In Vitro Strip; Patient Sig: Kimberly Contour Test In Vitro Strip ; 100; 0; 06-May-2012; Active, Disp: , Rfl:   •  Insulin Pen Needle (NOVOFINE) 32G X 6 MM misc, , Disp: , Rfl:   •  levothyroxine (SYNTHROID, LEVOTHROID) 150 MCG tablet, Take 1 tablet by mouth Daily., Disp: 90 tablet, Rfl: 2  •  Liraglutide (Victoza) 18 MG/3ML solution pen-injector injection, Inject 1.8 mg under the skin into the appropriate area as directed Daily., Disp: 9 mL, Rfl: 1  •  metFORMIN (GLUCOPHAGE) 1000 MG tablet, TAKE ONE TABLET BY MOUTH TWO TIMES A DAY, Disp: 180 tablet, Rfl: 0  •  Multiple Vitamin (MULTI-DAY VITAMINS PO), Take 1 tablet by mouth Daily. HOLD PRIOR TO SURG, Disp: , Rfl:   •  traZODone (DESYREL) 50 MG tablet, Take 50 mg by mouth Every Night., Disp: , Rfl:   •  Aspirin Buf,CaCarb-MgCarb-MgO, 81 MG tablet, Take 81 mg by mouth Daily., Disp: , Rfl:   No current facility-administered medications for this visit.     Facility-Administered Medications Ordered in Other Visits:   •  Chlorhexidine Gluconate Cloth 2 % pads 1 application, 1 application, Topical, Q12H PRN, Bonny Márquez, ANETA        Review of Systems   Constitutional: Positive for fatigue and unexpected weight change.   HENT: Negative.    Eyes: Negative.    Respiratory: Positive for shortness of breath.    Cardiovascular: Negative.    Gastrointestinal: Negative.    Endocrine: Positive for cold intolerance.   Genitourinary: Negative.    Musculoskeletal: Negative.    Skin: Negative.    Allergic/Immunologic: Positive for environmental allergies.   Neurological: Negative.    Hematological: Negative.    Psychiatric/Behavioral: Positive for sleep disturbance. The patient is nervous/anxious.    All other systems reviewed and are negative.      Objective       Vitals:  "   06/19/20 1109   BP: 142/70   Pulse: 82   Resp: 20   SpO2: 98%   Weight: 87.2 kg (192 lb 3.2 oz)   Height: 180.3 cm (71\")     Body mass index is 26.81 kg/m².      Physical Exam   Constitutional: He is oriented to person, place, and time.   Neck: Normal range of motion. Neck supple.   Cardiovascular: Normal rate, regular rhythm, normal heart sounds and intact distal pulses.   Pulmonary/Chest: Effort normal and breath sounds normal.   Abdominal: Soft.   Musculoskeletal: Normal range of motion. He exhibits no edema.   Neurological: He is alert and oriented to person, place, and time.   Skin: Skin is warm and dry.   Nursing note and vitals reviewed.    Results Review:     I reviewed the patient's new clinical results.    Medical records reviewed  Summary:      Orders Only on 06/12/2020   Component Date Value Ref Range Status   • Testosterone, Total 06/12/2020 318  264 - 916 ng/dL Final    Comment: Adult male reference interval is based on a population of  healthy nonobese males (BMI <30) between 19 and 39 years old.  chaz Knox.[email or web address]. JCEM 2017,102;1644-8663. PMID: 41778076.     • Testosterone, Free 06/12/2020 6.4* 6.6 - 18.1 pg/mL Final   • Sex Hormone Binding Globulin 06/12/2020 37.9  19.3 - 76.4 nmol/L Final   • Glucose 06/12/2020 177* 65 - 99 mg/dL Final   • BUN 06/12/2020 20  8 - 23 mg/dL Final   • Creatinine 06/12/2020 1.14  0.76 - 1.27 mg/dL Final   • eGFR Non African Am 06/12/2020 63  >60 mL/min/1.73 Final    Comment: The MDRD GFR formula is only valid for adults with stable  renal function between ages 18 and 70.     • eGFR  Am 06/12/2020 77  >60 mL/min/1.73 Final   • BUN/Creatinine Ratio 06/12/2020 17.5  7.0 - 25.0 Final   • Sodium 06/12/2020 139  136 - 145 mmol/L Final   • Potassium 06/12/2020 5.1  3.5 - 5.2 mmol/L Final   • Chloride 06/12/2020 104  98 - 107 mmol/L Final   • Total CO2 06/12/2020 24.5  22.0 - 29.0 mmol/L Final   • Calcium 06/12/2020 9.7  8.6 - 10.5 mg/dL Final   • Total Protein " 06/12/2020 7.0  6.0 - 8.5 g/dL Final   • Albumin 06/12/2020 4.10  3.50 - 5.20 g/dL Final   • Globulin 06/12/2020 2.9  gm/dL Final   • A/G Ratio 06/12/2020 1.4  g/dL Final   • Total Bilirubin 06/12/2020 0.6  0.2 - 1.2 mg/dL Final   • Alkaline Phosphatase 06/12/2020 74  39 - 117 U/L Final   • AST (SGOT) 06/12/2020 24  1 - 40 U/L Final   • ALT (SGPT) 06/12/2020 32  1 - 41 U/L Final   • Total Cholesterol 06/12/2020 162  0 - 200 mg/dL Final   • Triglycerides 06/12/2020 135  0 - 150 mg/dL Final   • HDL Cholesterol 06/12/2020 42  40 - 60 mg/dL Final   • VLDL Cholesterol 06/12/2020 27  mg/dL Final   • LDL Cholesterol  06/12/2020 93  0 - 100 mg/dL Final   • Creatinine, Urine 06/12/2020 105.3  Not Estab. mg/dL Final   • Microalbumin, Urine 06/12/2020 6.0  Not Estab. ug/mL Final   • Microalbumin/Creatinine Ratio 06/12/2020 6  0 - 29 mg/g creat Final    Comment:                        Normal:                0 -  29                         Moderately increased: 30 - 300                         Severely increased:       >300                **Please note reference interval change**     • Hemoglobin A1C 06/12/2020 6.80* 4.80 - 5.60 % Final    Comment: Hemoglobin A1C Ranges:  Increased Risk for Diabetes  5.7% to 6.4%  Diabetes                     >= 6.5%  Diabetic Goal                < 7.0%     • Free T4 06/12/2020 1.06  0.93 - 1.70 ng/dL Final    Results may be falsely increased if patient taking Biotin.   • TSH 06/12/2020 3.340  0.270 - 4.200 uIU/mL Final   • Interpretation 06/12/2020 Note   Final    Supplemental report is available.   • PDF Image 06/12/2020 Not applicable   Final     Lab Results   Component Value Date    HGBA1C 6.80 (H) 06/12/2020    HGBA1C 6.20 (H) 11/01/2019    HGBA1C 6.50 (H) 07/26/2019     Lab Results   Component Value Date    MICROALBUR 6.0 06/12/2020    CREATININE 1.14 06/12/2020     Imaging Results (Most Recent)     None                Assessment and Plan:    Spencer was seen today for diabetes,  hypothyroidism and hypogonadism.    Diagnoses and all orders for this visit:    Postablative hypothyroidism  -     Comprehensive Metabolic Panel; Future  -     Hemoglobin A1c; Future  -     Microalbumin / Creatinine Urine Ratio - Urine, Clean Catch; Future  -     TSH; Future  -     T4, Free; Future    Uncontrolled type 2 diabetes mellitus with complication, without long-term current use of insulin (CMS/[facility])  -     Liraglutide (Victoza) 18 MG/3ML solution pen-injector injection; Inject 1.8 mg under the skin into the appropriate area as directed Daily.  -     Comprehensive Metabolic Panel; Future  -     Hemoglobin A1c; Future  -     Microalbumin / Creatinine Urine Ratio - Urine, Clean Catch; Future  -     TSH; Future  -     T4, Free; Future    Hyperinsulinism  -     Comprehensive Metabolic Panel; Future  -     Hemoglobin A1c; Future  -     Microalbumin / Creatinine Urine Ratio - Urine, Clean Catch; Future  -     TSH; Future  -     T4, Free; Future    Graves' ophthalmopathy  -     Comprehensive Metabolic Panel; Future  -     Hemoglobin A1c; Future  -     Microalbumin / Creatinine Urine Ratio - Urine, Clean Catch; Future  -     TSH; Future  -     T4, Free; Future    Hyperlipidemia associated with type 2 diabetes mellitus (CMS/[facility])  -     Comprehensive Metabolic Panel; Future  -     Hemoglobin A1c; Future  -     Microalbumin / Creatinine Urine Ratio - Urine, Clean Catch; Future  -     TSH; Future  -     T4, Free; Future    Graves disease  -     Comprehensive Metabolic Panel; Future  -     Hemoglobin A1c; Future  -     Microalbumin / Creatinine Urine Ratio - Urine, Clean Catch; Future  -     TSH; Future  -     T4, Free; Future    Other orders  -     levothyroxine (SYNTHROID, LEVOTHROID) 150 MCG tablet; Take 1 tablet by mouth Daily.  -     glimepiride (AMARYL) 2 MG tablet; Take 1 tablet by mouth Daily.    Patient reports his blood sugars are generally below 150  His hemoglobin A1c 6.8% still much stable  All other labs are  "stable including TSH and free T4    I advised the patient to continue the current medication  Patient return to follow-up in 6 months with labs      Curt Brown MD. FACE    06/19/20      EMR Dragon / transcription disclaimer:     \"Dictated utilizing Dragon dictation\".         "

## 2020-06-24 ENCOUNTER — RESULTS ENCOUNTER (OUTPATIENT)
Dept: ENDOCRINOLOGY | Age: 70
End: 2020-06-24

## 2020-06-24 DIAGNOSIS — E89.0 POSTABLATIVE HYPOTHYROIDISM: ICD-10-CM

## 2020-06-24 DIAGNOSIS — E16.1 HYPERINSULINISM: ICD-10-CM

## 2020-06-24 DIAGNOSIS — E78.5 HYPERLIPIDEMIA ASSOCIATED WITH TYPE 2 DIABETES MELLITUS (HCC): ICD-10-CM

## 2020-06-24 DIAGNOSIS — E11.69 HYPERLIPIDEMIA ASSOCIATED WITH TYPE 2 DIABETES MELLITUS (HCC): ICD-10-CM

## 2020-06-24 DIAGNOSIS — E05.00 GRAVES' OPHTHALMOPATHY: ICD-10-CM

## 2020-06-24 DIAGNOSIS — E05.00 GRAVES DISEASE: ICD-10-CM

## 2020-06-24 DIAGNOSIS — IMO0002 UNCONTROLLED TYPE 2 DIABETES MELLITUS WITH COMPLICATION, WITHOUT LONG-TERM CURRENT USE OF INSULIN: ICD-10-CM

## 2020-08-07 ENCOUNTER — RESULTS ENCOUNTER (OUTPATIENT)
Dept: ENDOCRINOLOGY | Age: 70
End: 2020-08-07

## 2020-08-07 DIAGNOSIS — E78.5 HYPERLIPIDEMIA ASSOCIATED WITH TYPE 2 DIABETES MELLITUS (HCC): ICD-10-CM

## 2020-08-07 DIAGNOSIS — E05.00 GRAVES' OPHTHALMOPATHY: ICD-10-CM

## 2020-08-07 DIAGNOSIS — E16.1 HYPERINSULINISM: ICD-10-CM

## 2020-08-07 DIAGNOSIS — E05.00 GRAVES DISEASE: ICD-10-CM

## 2020-08-07 DIAGNOSIS — E11.69 HYPERLIPIDEMIA ASSOCIATED WITH TYPE 2 DIABETES MELLITUS (HCC): ICD-10-CM

## 2020-08-07 DIAGNOSIS — E89.0 POSTABLATIVE HYPOTHYROIDISM: ICD-10-CM

## 2020-08-07 DIAGNOSIS — IMO0002 UNCONTROLLED TYPE 2 DIABETES MELLITUS WITH COMPLICATION, WITHOUT LONG-TERM CURRENT USE OF INSULIN: ICD-10-CM

## 2020-08-11 ENCOUNTER — TELEPHONE (OUTPATIENT)
Dept: ENDOCRINOLOGY | Age: 70
End: 2020-08-11

## 2020-08-11 NOTE — TELEPHONE ENCOUNTER
Pt fell or had syncope issue passed out at home and went to the ER has elevated lipase of 340  Then he had it repeated again 260  His pcp wants him off Victoza for 1 week and if he needs another med what does he need to be on to replace the Victoza until labs rechecked please advise

## 2020-08-12 ENCOUNTER — TELEPHONE (OUTPATIENT)
Dept: ENDOCRINOLOGY | Age: 70
End: 2020-08-12

## 2020-08-12 RX ORDER — SITAGLIPTIN 100 MG/1
100 TABLET, FILM COATED ORAL DAILY
Qty: 90 TABLET | Refills: 3 | Status: SHIPPED | OUTPATIENT
Start: 2020-08-12 | End: 2020-08-13

## 2020-08-12 NOTE — TELEPHONE ENCOUNTER
Pt has had lobectomy due to cancer and a cabg last year he read the side effects of juaniva and it can cause issues with the pancreas and cause respiratory issues he doesn't feel that this would be a could choice for him due to elevated pancreatic enzymes see prev message pt fell had a syncopal episode and abd pain due to fall is going to PCP this week to have repeat labs drawn

## 2020-08-13 RX ORDER — PIOGLITAZONEHYDROCHLORIDE 30 MG/1
30 TABLET ORAL DAILY
Qty: 90 TABLET | Refills: 3 | Status: SHIPPED | OUTPATIENT
Start: 2020-08-13 | End: 2020-08-20

## 2020-08-15 ENCOUNTER — PATIENT MESSAGE (OUTPATIENT)
Dept: CARDIOLOGY | Facility: CLINIC | Age: 70
End: 2020-08-15

## 2020-08-17 NOTE — TELEPHONE ENCOUNTER
From: Spencer Sinha  To: Argentina Prieto MD  Sent: 8/15/2020 6:54 PM EDT  Subject: Non-Urgent Medical Question    I've had a few episodes of fainting over the Summer. They occur when I stand from a seated or prone position. I suspect my blood pressure is too low, but I'm also concerned that Diabetes may be affecting the BP.  I am scheduled to see my PCP this Wednesday if there is any testing she might do.    sys francie pul  07-31 06:00 /62 70   Fainted. Hit toilet. ER. All OK  07-31 09:45 /74 63   08-04 11:00 /58 67   08-07 09:25 AM 92/60. @PCP  08-08 02:40 /66 66   08-09 10:45 /73 64   08-10 08:20 /72 67   08-11 08:00 /[age]   08-12 11:30 /69 62   08-13 07:50 /67 58   08-14 05:00 /70 71   08-15 11:15 /78 64   Fainted. Getting up from chair.  08-15 02:45 /66 61

## 2020-08-20 ENCOUNTER — OFFICE VISIT (OUTPATIENT)
Dept: CARDIOLOGY | Facility: CLINIC | Age: 70
End: 2020-08-20

## 2020-08-20 VITALS
DIASTOLIC BLOOD PRESSURE: 50 MMHG | BODY MASS INDEX: 26.77 KG/M2 | SYSTOLIC BLOOD PRESSURE: 90 MMHG | WEIGHT: 191.2 LBS | HEART RATE: 78 BPM | HEIGHT: 71 IN

## 2020-08-20 DIAGNOSIS — I95.1 ORTHOSTATIC HYPOTENSION: ICD-10-CM

## 2020-08-20 DIAGNOSIS — R55 SYNCOPE AND COLLAPSE: Primary | ICD-10-CM

## 2020-08-20 DIAGNOSIS — E78.2 MIXED HYPERLIPIDEMIA: ICD-10-CM

## 2020-08-20 DIAGNOSIS — R55 NEAR SYNCOPE: ICD-10-CM

## 2020-08-20 DIAGNOSIS — I73.9 PERIPHERAL VASCULAR DISEASE (HCC): ICD-10-CM

## 2020-08-20 DIAGNOSIS — I25.10 CHRONIC CORONARY ARTERY DISEASE: ICD-10-CM

## 2020-08-20 DIAGNOSIS — I48.0 PAF (PAROXYSMAL ATRIAL FIBRILLATION) (HCC): ICD-10-CM

## 2020-08-20 PROCEDURE — 99214 OFFICE O/P EST MOD 30 MIN: CPT | Performed by: NURSE PRACTITIONER

## 2020-08-20 PROCEDURE — 93000 ELECTROCARDIOGRAM COMPLETE: CPT | Performed by: NURSE PRACTITIONER

## 2020-08-20 NOTE — PROGRESS NOTES
Date of Office Visit: 2020  Encounter Provider: ANETA Marquez  Place of Service: [facility] CARDIOLOGY  Patient Name: Spencer Sinha  :1950  Primary Cardiologist: Dr. Argentina Prieto    Chief Complaint   Patient presents with   • Dizziness   • Coronary Artery Disease   • Follow-up   :     Dear Dr. Phil Gilliam,     HPI: Spencer Sinha is a pleasant 70 y.o. male who presents today for evaluation of lightheadedness, near-syncope, and syncope.    He has been diagnosed with type 2 diabetes mellitus, Graves' disease, hypertension, and hyperlipidemia.  He has a known history of atrial fibrillation due to thyroid toxicosis.  When his thyroid was treated his atrial fibrillation resolved.      In , he had an abnormal nuclear stress test which showed anterior jeffry-infarct ischemia.  In , a cardiac catheterization showed severe LAD disease with good collateralization and medical treatment was recommended.  He has a known history of peripheral arterial disease and status post aortobifemoral bypass by Dr. Solo in .  He was a previous cigarette smoker and quit in .    In , he had a CT scan recently which showed extensive coronary artery calcification and he was experiencing mild exertional dyspnea.  A nuclear stress test was obtained which showed a medium sized area of moderate to severe ischemia in the inferior apical distribution.  He underwent cardiac catheterization 19 which showed the following: Normal left main, occluded LAD with left to left collaterals, 50% proximal and mid circumflex, 90% ostial RCA, 90% proximal and distal RCA.  On 19 he underwent coronary artery bypass graft surgery x3 with LIMA to LAD, SVG to OM, and SVG to PDA by Dr. Crawford.  Dr. Argentina Prieto consulted on him during that hospitalization.  He had a carotid duplex 19 which showed right and left internal carotid artery plaque bilaterally with no  significant stenosis.  He had a brief episode of postoperative atrial fibrillation and converted to normal sinus rhythm.  He was placed on a short course of amiodarone which was discontinued.    He presents today for evaluation of lightheadedness, near-syncope, and syncope.  Most recently he has had 2 syncopal episodes with position changes.  He has had about 18 episodes over the past 1.5 years which resulted in near syncopal episode.  Upon standing sometimes he feels lightheaded and other times he feels fine.  Orthostatic blood pressures completed today in the office were positive.  He drinks about 3 cups of coffee in the morning and 64 ounces of water throughout the day.  He is not on any blood pressure lowering medications.  He has been holding his Victoza because of elevated lipase level that was recently noted.  He is in the process of trying to find a new endocrinologist since his previous endocrinologist has left to [facility].  He occasionally experiences shortness of breath.  He denies chest pain, palpitations, or edema.    Past Medical History:   Diagnosis Date   • Atrial fibrillation (CMS/[facility])    • CAD (coronary artery disease)     S/p CABG on 02/8/19   • Chronic back pain     Hx Surgery   • Chronic knee pain     S/p Reg Total Knee Replacement   • Depression 07/2009    Trazadone   • Diplopia    • Discoid lupus erythematosus    • Generalized arthritis 1997   • Graves disease 12/2008    Hyperactive thyroid radiation   • Hyperlipidemia     Controlled w/Meds   • Hypertension 07/2008   • Hypogonadism, male    • Hypothyroidism, postablative    • Lactose intolerance 06/2017   • Left ventricular hypertrophy    • Long-term insulin use (CMS/[facility])    • Lung mass     R Lower Lung   • Metabolic disorder    • Near syncope 8/20/2020   • NSTEMI (non-ST elevated myocardial infarction) (CMS/[facility])    • Orthostatic hypotension 8/20/2020   • PAD (peripheral artery disease) (CMS/[facility]) 12/2007   • Right bundle branch block with left  anterior fascicular block    • Syncope Hx   • Syncope and collapse 2020   • Thyrotoxicosis factitia    • Type 2 diabetes mellitus (CMS/HCC) 1982       Past Surgical History:   Procedure Laterality Date   • ABDOMINAL AORTA STENT Bilateral     Using Vein-Aortofemoral   • BACK SURGERY     • CARDIAC CATHETERIZATION N/A 2019    Procedure: Coronary angiography;  Surgeon: Hardik Silverman MD;  Location: [facility] CATH INVASIVE LOCATION;  Service: Cardiovascular   • CARDIAC CATHETERIZATION N/A 2019    Procedure: Left Heart Cath;  Surgeon: Hardik Silverman MD;  Location: [facility] CATH INVASIVE LOCATION;  Service: Cardiovascular   • CARDIAC CATHETERIZATION N/A 2019    Procedure: Left ventriculography;  Surgeon: Hardik Silverman MD;  Location: [facility] CATH INVASIVE LOCATION;  Service: Cardiovascular   • CATARACT EXTRACTION     • COLONOSCOPY     • CORONARY ARTERY BYPASS GRAFT N/A 2019    Procedure: THANG STERNOTOMY CORONARY ARTERY BYPASS GRAFT TIMES 3 USING LEFT INTERNAL MAMMARY ARTERY AND LEFT GREATER SAPHENOUS VEIN GRAFT PER ENDOSCOPIC VEIN GRAFT AND PRP.;  Surgeon: Nathaniel Crawford MD;  Location: [facility] MAIN OR;  Service: Cardiothoracic   • LUMBAR LAMINECTOMY  10/2015    And fusion L4/L5   • LUNG BIOPSY      Right lung mass   • OTHER SURGICAL HISTORY      Catheter Ablation   • REPLACEMENT TOTAL KNEE BILATERAL      10/2010 and 2010   • TONSILLECTOMY AND ADENOIDECTOMY         Social History     Socioeconomic History   • Marital status:      Spouse name: Not on file   • Number of children: Not on file   • Years of education: Not on file   • Highest education level: Not on file   Occupational History   • Occupation: RETIRED   Tobacco Use   • Smoking status: Former Smoker     Types: Cigarettes     Last attempt to quit: 2014     Years since quittin.6   • Smokeless tobacco: Never Used   Substance and Sexual Activity   • Alcohol use: No     Comment: Caffeine use: 3-4 cups daily   • Drug use: No   •  Sexual activity: Defer       Family History   Problem Relation Age of Onset   • Coronary artery disease Other    • Malig Hyperthermia Neg Hx        The following portion of the patient's history were reviewed and updated as appropriate: past medical history, past surgical history, past social history, past family history, allergies, current medications, and problem list.    Review of Systems   Constitution: Negative for chills, diaphoresis, fever, malaise/fatigue, night sweats, weight gain and weight loss.   HENT: Negative for hearing loss, nosebleeds, sore throat and tinnitus.    Eyes: Negative for blurred vision, double vision, pain and visual disturbance.   Cardiovascular: Positive for dyspnea on exertion, near-syncope and syncope. Negative for chest pain, claudication, cyanosis, irregular heartbeat, leg swelling, orthopnea, palpitations and paroxysmal nocturnal dyspnea.   Respiratory: Negative for cough, hemoptysis, shortness of breath, snoring and wheezing.    Endocrine: Negative for cold intolerance, heat intolerance and polyuria.   Hematologic/Lymphatic: Negative for bleeding problem. Does not bruise/bleed easily.   Skin: Negative for color change, dry skin, flushing and itching.   Musculoskeletal: Negative for falls, joint pain, joint swelling, muscle cramps, muscle weakness and myalgias.   Gastrointestinal: Negative for abdominal pain, constipation, heartburn, melena, nausea and vomiting.   Genitourinary: Negative for dysuria and hematuria.   Neurological: Positive for dizziness and light-headedness. Negative for excessive daytime sleepiness, loss of balance, numbness, paresthesias, seizures and vertigo.   Psychiatric/Behavioral: Negative for altered mental status, depression, memory loss and substance abuse. The patient is nervous/anxious. The patient does not have insomnia.    Allergic/Immunologic: Negative for environmental allergies.       Allergies   Allergen Reactions   • Lactose Intolerance (Gi) GI  "Intolerance     Other reaction(s): GI Intolerance         Current Outpatient Medications:   •  ALPRAZolam (XANAX) 0.5 MG tablet, Take 0.25 mg by mouth 2 (Two) Times a Day., Disp: , Rfl:   •  aspirin 81 MG EC tablet, Take 81 mg by mouth Daily. HOLDING PER MD, Disp: , Rfl:   •  atorvastatin (LIPITOR) 40 MG tablet, Take 1 tablet by mouth Daily., Disp: 90 tablet, Rfl: 2  •  Cholecalciferol (VITAMIN D3) 2000 units tablet, Take 1 tablet by mouth Daily., Disp: , Rfl:   •  clopidogrel (PLAVIX) 75 MG tablet, Take 75 mg by mouth Daily. HOLDING PER MD, Disp: , Rfl:   •  DULoxetine (CYMBALTA) 30 MG capsule, Take 30 mg by mouth Daily., Disp: , Rfl:   •  DULoxetine (CYMBALTA) 60 MG capsule, Take 60 mg by mouth Every Evening., Disp: , Rfl:   •  glimepiride (AMARYL) 2 MG tablet, TAKE ONE TABLET BY MOUTH DAILY AS DIRECTED (Patient taking differently: TAKE ONE 0.5MG TABLET BY MOUTH DAILY AS DIRECTED), Disp: 90 tablet, Rfl: 0  •  glucose blood (KIMBERLY CONTOUR TEST) test strip, Kimberly Contour Test In Vitro Strip; Patient Sig: Kimberly Contour Test In Vitro Strip ; 100; 0; 06-May-2012; Active, Disp: , Rfl:   •  Insulin Pen Needle (NOVOFINE) 32G X 6 MM misc, , Disp: , Rfl:   •  levothyroxine (SYNTHROID, LEVOTHROID) 175 MCG tablet, Take 175 mcg by mouth Daily., Disp: , Rfl:   •  metFORMIN (GLUCOPHAGE) 1000 MG tablet, TAKE ONE TABLET BY MOUTH TWICE A DAY, Disp: 180 tablet, Rfl: 0  •  metoprolol tartrate (LOPRESSOR) 50 MG tablet, Take 1 tablet by mouth Every 12 (Twelve) Hours for 90 days., Disp: 60 tablet, Rfl: 2  •  Multiple Vitamin (MULTI-DAY VITAMINS PO), Take 1 tablet by mouth Daily. HOLD PRIOR TO SURG, Disp: , Rfl:   •  Syringe/Needle, Disp, (B-D SYRINGE/NEEDLE 3CC/22GX1.5) 22G X 1-1/2\" 3 ML misc, , Disp: , Rfl:   •  Testosterone Cypionate 200 MG/ML kit, Inject  into the appropriate muscle as directed by prescriber Every 10 (Ten) Days., Disp: , Rfl:   •  traZODone (DESYREL) 50 MG tablet, Take 50 mg by mouth Every Night., Disp: , Rfl:   •  " "VICTOZA 18 MG/3ML solution pen-injector injection, DIAL AND INJECT SUBCUTANEOUSLY 1.8MG DAILY, Disp: 9 mL, Rfl: 3  •  Liraglutide (VICTOZA) 18 MG/3ML solution pen-injector injection, Inject 1.8 mg under the skin into the appropriate area as directed Daily., Disp: , Rfl:           Objective:     Vitals:    08/20/20 0810 08/20/20 0814 08/20/20 0819   BP: 120/70 110/60 90/50   BP Location: Left arm Left arm Left arm   Patient Position: Lying Sitting Standing   Pulse: 63 73 78   Weight: 86.7 kg (191 lb 3.2 oz)     Height: 180.3 cm (71\")       Body mass index is 26.67 kg/m².    PHYSICAL EXAM:    Vitals Reviewed.   General Appearance: No acute distress, well developed and well nourished.   Eyes: Conjunctiva and lids: No erythema, swelling, or discharge. Sclera non-icteric.   HENT: Atraumatic, normocephalic. External eyes, ears, and nose normal. No hearing loss noted. Mucous membranes normal. Lips not cyanotic. Neck supple with no tenderness.  Respiratory: No signs of respiratory distress. Respiration rhythm and depth normal.   Clear to auscultation. No rales, crackles, rhonchi, or wheezing auscultated.   Cardiovascular:  Jugular Venous Pressure: Normal  Heart Rate and Rhythm: Normal, Heart Sounds: Normal S1 and S2. No S3 or S4 noted.  Murmurs: No murmurs noted. No rubs, thrills, or gallops.   Chest incision well-healed.  Lower Extremities: No edema noted.  Gastrointestinal:  Abdomen soft, non-distended, non-tender. Normal bowel sounds. No hepatomegaly.   Musculoskeletal: Normal movement of extremities  Skin and Nails: General appearance normal. No pallor, cyanosis, diaphoresis. Skin temperature normal. No clubbing of fingernails.  Yellow bruise on right upper arm.  Psychiatric: Patient alert and oriented to person, place, and time. Speech and behavior appropriate. Normal mood and affect.       ECG 12 Lead  Date/Time: 8/20/2020 8:10 AM  Performed by: Michelle Wen APRN  Authorized by: Michelle Wen APRN "   Comparison: compared with previous ECG from 2/14/2020  Similar to previous ECG  Rhythm: sinus rhythm  Rate: normal  BPM: 63  Conduction: left anterior fascicular block  ST Segments: ST segments normal  T Waves: T waves normal  QRS axis: normal  Other findings: non-specific ST-T wave changes and left ventricular hypertrophy    Clinical impression: abnormal EKG              Assessment:       Diagnosis Plan   1. Syncope and collapse     2. Near syncope     3. Orthostatic hypotension     4. Chronic coronary artery disease     5. Mixed hyperlipidemia     6. PAF (paroxysmal atrial fibrillation) (CMS/[facility])     7. Peripheral vascular disease (CMS/[facility])            Plan:       1-3.  Syncope, Near Syncope, and Orthostatic Hypotension: Recently he has had 2 episodes of syncope after standing.  He will feel lightheaded for a brief moment and then passed out.  When he comes to he feels fine.  He denies any associated chest pain or palpitations with these episodes.  Over the past 1.5 years he has had about 18 episodes of near syncope.  He is not on any blood pressure lowering medications.  Orthostatics were positive today.  Dr. Prieto said it was likely that his diabetes is causing issues with his vasculature and neuropathy causing the syncopal episodes.  I have recommended slow positional changes, 64 ounces of water daily and extra hydration if he drinks caffeine or works outside, eating breakfast with protein and a little salt, and compression stockings.  We do have the options of Florinef for midodrine and he has declined that at this time.  He will call if he continues to have syncopal episodes.    4/5.  Coronary Artery Disease/CABG: He status post CABG x3 in 2019.  Continue aspirin, atorvastatin, and clopidogrel.  Denies angina.     6.  Hyperlipidemia: He remains on atorvastatin with LDL goal less than 70.      7.  Postoperative Atrial Fibrillation: No recurrence documented since open heart surgery.    8.  Peripheral  Vascular Disease: Remains on aspirin, clopidogrel, and atorvastatin.  Followed by Dr. Gisela Hutchison.     9.  I recommend follow-up with Dr. Argentina Prieto in 3-4 months, unless otherwise needed sooner.    As always, it has been a pleasure to participate in your patient's care. Thank you.       Sincerely,         ANETA Liu      COVID-19 Precautions - Patient was compliant in wearing a mask. When I saw the patient, I used appropriate personal protective equipment (PPE) including mask (standard procedure).  Additionally, I used gown, gloves, and eye shield if indicated.  Hand hygiene was completed before and after seeing the patient.

## 2020-12-23 ENCOUNTER — OFFICE VISIT (OUTPATIENT)
Dept: CARDIOLOGY | Facility: CLINIC | Age: 70
End: 2020-12-23

## 2020-12-23 VITALS
SYSTOLIC BLOOD PRESSURE: 118 MMHG | BODY MASS INDEX: 26.94 KG/M2 | HEIGHT: 71 IN | HEART RATE: 67 BPM | WEIGHT: 192.4 LBS | DIASTOLIC BLOOD PRESSURE: 60 MMHG

## 2020-12-23 DIAGNOSIS — I95.1 ORTHOSTATIC HYPOTENSION: ICD-10-CM

## 2020-12-23 DIAGNOSIS — I48.0 PAF (PAROXYSMAL ATRIAL FIBRILLATION) (HCC): ICD-10-CM

## 2020-12-23 DIAGNOSIS — E78.2 MIXED HYPERLIPIDEMIA: ICD-10-CM

## 2020-12-23 DIAGNOSIS — I25.10 CHRONIC CORONARY ARTERY DISEASE: Primary | ICD-10-CM

## 2020-12-23 DIAGNOSIS — I73.9 PERIPHERAL VASCULAR DISEASE (HCC): ICD-10-CM

## 2020-12-23 DIAGNOSIS — I10 ESSENTIAL HYPERTENSION: ICD-10-CM

## 2020-12-23 PROCEDURE — 99214 OFFICE O/P EST MOD 30 MIN: CPT | Performed by: INTERNAL MEDICINE

## 2020-12-23 PROCEDURE — 93000 ELECTROCARDIOGRAM COMPLETE: CPT | Performed by: INTERNAL MEDICINE

## 2020-12-23 NOTE — PROGRESS NOTES
Date of Office Visit: 2020  Encounter Provider: Argentina Prieto MD  Place of Service: [facility] CARDIOLOGY  Patient Name: Spencer Sinha  :1950      Patient ID:  Spencer Sinha is a 70 y.o. male is here for  followup for CAD.        History of Present Illness    He was admitted to [facility], operated by [facility] on 2011 with syncope and  atrial fibrillation with rapid ventricular response. His atrial fibrillation  was due to thyrotoxicosis. He sees Dr. Brown for this. He converted  to sinus rhythm with medication, was stable and able to be discharged from  the hospital on propylthiouracil.      He had bilateral knee replacements in  with Dr. Mena. Prior to that  he had an abnormal stress Myoview study showing anterior infarct with  periinfarct ischemia. He went on to have a cardiac catheterization showing  severe left anterior descending artery disease, which was well  collateralized and he was treated medically as he did not have any active  angina.      He has a known history of peripheral arterial disease and had aortobifemoral  bypass done by Dr. Solo in . He follows with Dr. Olivera.       He has discoid lupus and follows with Dr. Foster.   He stopped smoking in .     He had a CT scan 2018 which showed extensive coronary artery calcification and he was experiencing mild exertional dyspnea.  A nuclear stress test was obtained which showed a medium sized area of moderate to severe ischemia in the inferior apical distribution.  He underwent cardiac catheterization 19 which showed the following: Normal left main, occluded LAD with left to left collaterals, 50% proximal and mid circumflex, 90% ostial RCA, 90% proximal and distal RCA.  On 19 he underwent coronary artery bypass graft surgery x3 with LIMA to LAD, SVG to OM, and SVG to PDA by Dr. Crawford.  He had a carotid duplex 19 which showed right and left internal carotid  artery plaque bilaterally with no significant stenosis.  He had a brief episode of postoperative atrial fibrillation and converted to normal sinus rhythm.  He was placed on a short course of amiodarone.     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 Silva May 2019.  He also had lymph node dissection.  He is being watched by Dr. Renee with serial scans.      Labs on 12/4/2020 show normal BMP, hemoglobin A1c 6.3.  Lipids done 6/12/2020 showed triglyceride 162, HDL 42, LDL 93.  Liver panel done 8/26/20 normal.  Normal CBC done 1/15/2020.      He had a severe episode of syncope in July.  At that time he had some pancreatic damage because of hitting his abdomen very hard.  His Victoza was stopped.  Its not been restarted and he is controlling his blood sugar with low carbohydrate diet and doing better.  He still has episodes of near syncope and dizziness and has had occasional falls but no further injuries.  He has no exertional chest tightness or pressure.  He has no tachycardia.  He has no orthopnea or PND.  He has no exertional dyspnea.  He sees oncology in 1 month.  Overall he is doing well.    Past Medical History:   Diagnosis Date   • Atrial fibrillation (CMS/HCC)    • CAD (coronary artery disease)     S/p CABG on 02/8/19   • Chronic back pain     Hx Surgery   • Chronic knee pain     S/p Reg Total Knee Replacement   • Depression 07/2009    Trazadone   • Diplopia    • Discoid lupus erythematosus    • Generalized arthritis 1997   • Graves disease 12/2008    Hyperactive thyroid radiation   • Hyperlipidemia     Controlled w/Meds   • Hypertension 07/2008   • Hypogonadism, male    • Hypothyroidism, postablative    • Lactose intolerance 06/2017   • Left ventricular hypertrophy    • Long-term insulin use (CMS/HCC)    • Lung mass     R Lower Lung   • Metabolic disorder    • Near syncope 8/20/2020   • NSTEMI (non-ST elevated myocardial  infarction) (CMS/HCC)    • Orthostatic hypotension 8/20/2020   • PAD (peripheral artery disease) (CMS/[facility]) 12/2007   • Right bundle branch block with left anterior fascicular block    • Syncope Hx   • Syncope and collapse 8/20/2020   • Thyrotoxicosis factitia    • Type 2 diabetes mellitus (CMS/HCC) 1982         Past Surgical History:   Procedure Laterality Date   • ABDOMINAL AORTA STENT Bilateral     Using Vein-Aortofemoral   • BACK SURGERY     • CARDIAC CATHETERIZATION N/A 1/31/2019    Procedure: Coronary angiography;  Surgeon: Hardik Silverman MD;  Location:  [facility] CATH INVASIVE [facility];  Service: Cardiovascular   • CARDIAC CATHETERIZATION N/A 1/31/2019    Procedure: Left Heart Cath;  Surgeon: Hardik Silverman MD;  Location:  [facility];  Service: Cardiovascular   • CARDIAC CATHETERIZATION N/A 1/31/2019    Procedure: Left ventriculography;  Surgeon: Hardik Silverman MD;  Location:  [facility];  Service: Cardiovascular   • CATARACT EXTRACTION  2018   • COLONOSCOPY     • CORONARY ARTERY BYPASS GRAFT N/A 2/8/2019    Procedure: THANG STERNOTOMY CORONARY ARTERY BYPASS GRAFT TIMES 3 USING LEFT INTERNAL MAMMARY ARTERY AND LEFT GREATER SAPHENOUS VEIN GRAFT PER ENDOSCOPIC VEIN GRAFT AND PRP.;  Surgeon: Nathaniel Crawford MD;  Location: [facility] MAIN OR;  Service: Cardiothoracic   • LUMBAR LAMINECTOMY  10/2015    And fusion L4/L5   • LUNG BIOPSY      Right lung mass   • OTHER SURGICAL HISTORY      Catheter Ablation   • REPLACEMENT TOTAL KNEE BILATERAL      10/2010 and 12/2010   • TONSILLECTOMY AND ADENOIDECTOMY         Current Outpatient Medications on File Prior to Visit   Medication Sig Dispense Refill   • albuterol sulfate  (90 Base) MCG/ACT inhaler      • ALPRAZolam (XANAX) 0.5 MG tablet Take 0.25 mg by mouth 4 (Four) Times a Day.     • aspirin 81 MG EC tablet Take 81 mg by mouth Daily. HOLDING PER MD     • atorvastatin (LIPITOR) 40 MG tablet TAKE ONE TABLET BY MOUTH DAILY 90  tablet 0   • Cholecalciferol (VITAMIN D3) 2000 units tablet Take 1 tablet by mouth Daily.     • clopidogrel (PLAVIX) 75 MG tablet Take 75 mg by mouth Daily. HOLDING PER MD     • DULoxetine (CYMBALTA) 60 MG capsule Take 60 mg by mouth Every Evening.     • fluticasone (FLONASE) 50 MCG/ACT nasal spray      • glimepiride (AMARYL) 2 MG tablet Take 1 tablet by mouth Daily. 90 tablet 2   • glucose blood (KIMBERLY CONTOUR TEST) test strip Kimberly Contour Test In Vitro Strip; Patient Sig: Kimberly Contour Test In Vitro Strip ; 100; 0; -May-2012; Active     • Insulin Pen Needle (NOVOFINE) 32G X 6 MM misc      • levothyroxine (SYNTHROID, LEVOTHROID) 150 MCG tablet Take 1 tablet by mouth Daily. (Patient taking differently: Take 175 mcg by mouth Daily.) 90 tablet 2   • metFORMIN (GLUCOPHAGE) 1000 MG tablet TAKE ONE TABLET BY MOUTH TWO TIMES A  tablet 0   • Multiple Vitamin (MULTI-DAY VITAMINS PO) Take 1 tablet by mouth Daily. HOLD PRIOR TO SURG     • traZODone (DESYREL) 50 MG tablet Take 50 mg by mouth Every Night.     • [DISCONTINUED] DULoxetine (CYMBALTA) 30 MG capsule Take 30 mg by mouth Daily.     • [DISCONTINUED] Liraglutide (Victoza) 18 MG/3ML solution pen-injector injection Inject  under the skin into the appropriate area as directed Daily.       Current Facility-Administered Medications on File Prior to Visit   Medication Dose Route Frequency Provider Last Rate Last Admin   • Chlorhexidine Gluconate Cloth 2 % pads 1 application  1 application Topical Q12H PRN Bonny Márquez APRN           Social History     Socioeconomic History   • Marital status:      Spouse name: Not on file   • Number of children: Not on file   • Years of education: Not on file   • Highest education level: Not on file   Occupational History   • Occupation: RETIRED   Tobacco Use   • Smoking status: Former Smoker     Types: Cigarettes     Quit date:      Years since quittin.9   • Smokeless tobacco: Never Used   Substance and Sexual  "Activity   • Alcohol use: No     Comment: Caffeine use: 3-4 cups daily   • Drug use: No   • Sexual activity: Defer           Review of Systems   Constitution: Negative.   HENT: Negative for congestion.    Eyes: Negative for vision loss in left eye and vision loss in right eye.   Respiratory: Negative.  Negative for cough, hemoptysis, shortness of breath, sleep disturbances due to breathing, snoring, sputum production and wheezing.    Endocrine: Negative.    Hematologic/Lymphatic: Negative.    Skin: Negative for poor wound healing and rash.   Musculoskeletal: Negative for falls, gout, muscle cramps and myalgias.   Gastrointestinal: Negative for abdominal pain, diarrhea, dysphagia, hematemesis, melena, nausea and vomiting.   Neurological: Negative for excessive daytime sleepiness, dizziness, headaches, light-headedness, loss of balance, seizures and vertigo.   Psychiatric/Behavioral: Negative for depression and substance abuse. The patient is not nervous/anxious.        Procedures    ECG 12 Lead    Date/Time: 12/23/2020 8:59 AM  Performed by: Argentina Prieto MD  Authorized by: Argentina Prieto MD   Comparison: compared with previous ECG   Similar to previous ECG  Rhythm: sinus rhythm  Conduction: left anterior fascicular block  Other findings: left ventricular hypertrophy and poor R wave progression    Clinical impression: abnormal EKG                Objective:      Vitals:    12/23/20 0846   BP: 118/60   BP Location: Left arm   Pulse: 67   Weight: 87.3 kg (192 lb 6.4 oz)   Height: 180.3 cm (71\")     Body mass index is 26.83 kg/m².    Vitals signs reviewed.   Constitutional:       General: Not in acute distress.     Appearance: Well-developed. Not diaphoretic.   Eyes:      General: No scleral icterus.     Conjunctiva/sclera: Conjunctivae normal.   HENT:      Head: Normocephalic and atraumatic.   Neck:      Musculoskeletal: Neck supple.      Thyroid: No thyromegaly.      Vascular: No carotid bruit or JVD.    "   Lymphadenopathy: No cervical adenopathy.   Pulmonary:      Effort: Pulmonary effort is normal. No respiratory distress.      Breath sounds: Normal breath sounds. No wheezing. No rhonchi. No rales.   Chest:      Chest wall: Not tender to palpatation.   Cardiovascular:      Normal rate. Regular rhythm.      Murmurs: There is no murmur.      No gallop.   Pulses:     Intact distal pulses.   Edema:     Peripheral edema absent.   Abdominal:      General: Bowel sounds are normal. There is no distension or abdominal bruit.      Palpations: Abdomen is soft. There is no abdominal mass.      Tenderness: There is no abdominal tenderness.   Musculoskeletal:         General: No deformity.      Extremities: No clubbing present.  Skin:     General: Skin is warm and dry. There is no cyanosis.      Coloration: Skin is not pale.      Findings: No rash.   Neurological:      Mental Status: Alert and oriented to person, place, and time.      Cranial Nerves: No cranial nerve deficit.   Psychiatric:         Judgment: Judgment normal.         Lab Review:       Assessment:      Diagnosis Plan   1. Chronic coronary artery disease     2. Mixed hyperlipidemia     3. Essential hypertension     4. PAF (paroxysmal atrial fibrillation) (CMS/[facility])     5. Peripheral vascular disease (CMS/HCC)     6. Orthostatic hypotension       1. History of atrial fibrillation due to thyroid toxicosis. No recurrence of atrial fibrillation.  2. Hypertension, under good control. Has history of left ventricular hypertrophy.  3. Hyperlipidemia, per Dr. Brown.   4. Diabetes mellitus type 2, per Dr. Brown. Well controlled.  5. Peripheral arterial disease status post bilateral aorta with bifemoral  bypasses. Follows with Dr. Olivera annually and everything has been stable.   6. Tobacco use. He stopped smoking in 2015.   7. Coronary disease with CABG 2/2019. No angina.   8. Abnormal ECG with left anterior fascicular block.  9. Osteoarthritis.   10. Stress  with depression, under better control.   11. Right lung cancer with lobectomy 5/2019, doing well, follows with Dr. Renee.      Plan:       See frantz in 1 year. Doing well no changes.

## 2021-03-01 ENCOUNTER — TELEPHONE (OUTPATIENT)
Dept: CARDIOLOGY | Facility: CLINIC | Age: 71
End: 2021-03-01

## 2021-03-01 NOTE — TELEPHONE ENCOUNTER
Received fax from [facility]  Pt is needing cardiac clearance to have MRI Fusion prostate biopsy performed under local anesthesia by Dr. Elias Ariza    Needing approval to hold Aspirin and Plavix 7 days prior to procedure     Pt was last seen on 12/23/2020 by       I have placed letter that needs to be signed in your in box

## 2021-07-20 ENCOUNTER — TELEPHONE (OUTPATIENT)
Dept: CARDIOLOGY | Facility: CLINIC | Age: 71
End: 2021-07-20

## 2021-07-20 NOTE — TELEPHONE ENCOUNTER
I cannot see the test - pls let him know congratulations and we will get the test results.        ----- Message from Nisreen Glasgow CMA sent at 7/20/2021  6:51 AM EDT -----  Regarding: FW: Test Results Question  Contact: [phone]    ----- Message -----  From: Spencer Sinha  Sent: 7/19/2021   8:15 PM EDT  To: Paolo Hi [facility]  Subject: Test Results Question                            I had a CT/PET scan this morning from the base of my skull to my lower abdomen. The test uses radioactive glucose to detect cancer cells. ([email or web address])   NO CANCER!   It's been 26 months since my lower right lung was removed.  Can you access [employer] and see the test results? If not I can mail you a copy.

## 2022-01-06 ENCOUNTER — OFFICE VISIT (OUTPATIENT)
Dept: CARDIOLOGY | Facility: CLINIC | Age: 72
End: 2022-01-06

## 2022-01-06 VITALS
SYSTOLIC BLOOD PRESSURE: 138 MMHG | HEIGHT: 71 IN | HEART RATE: 72 BPM | OXYGEN SATURATION: 96 % | BODY MASS INDEX: 27.72 KG/M2 | DIASTOLIC BLOOD PRESSURE: 80 MMHG | RESPIRATION RATE: 16 BRPM | WEIGHT: 198 LBS

## 2022-01-06 DIAGNOSIS — I48.0 PAF (PAROXYSMAL ATRIAL FIBRILLATION): ICD-10-CM

## 2022-01-06 DIAGNOSIS — I73.9 PERIPHERAL VASCULAR DISEASE: ICD-10-CM

## 2022-01-06 DIAGNOSIS — I44.4 LAFB (LEFT ANTERIOR FASCICULAR BLOCK): ICD-10-CM

## 2022-01-06 DIAGNOSIS — E78.2 MIXED HYPERLIPIDEMIA: ICD-10-CM

## 2022-01-06 DIAGNOSIS — I10 PRIMARY HYPERTENSION: ICD-10-CM

## 2022-01-06 DIAGNOSIS — I25.10 CHRONIC CORONARY ARTERY DISEASE: Primary | ICD-10-CM

## 2022-01-06 PROBLEM — R55 SYNCOPE AND COLLAPSE: Status: RESOLVED | Noted: 2020-08-20 | Resolved: 2022-01-06

## 2022-01-06 PROBLEM — F32.A DEPRESSIVE DISORDER: Status: ACTIVE | Noted: 2021-02-09

## 2022-01-06 PROBLEM — E78.5 HYPERLIPIDEMIA ASSOCIATED WITH TYPE 2 DIABETES MELLITUS: Status: RESOLVED | Noted: 2018-09-15 | Resolved: 2022-01-06

## 2022-01-06 PROBLEM — R55 NEAR SYNCOPE: Status: RESOLVED | Noted: 2020-08-20 | Resolved: 2022-01-06

## 2022-01-06 PROBLEM — M50.30 DEGENERATION OF INTERVERTEBRAL DISC OF CERVICAL REGION: Status: ACTIVE | Noted: 2021-02-09

## 2022-01-06 PROBLEM — I95.1 ORTHOSTATIC HYPOTENSION: Status: RESOLVED | Noted: 2020-08-20 | Resolved: 2022-01-06

## 2022-01-06 PROBLEM — G25.81 RLS (RESTLESS LEGS SYNDROME): Status: ACTIVE | Noted: 2022-01-05

## 2022-01-06 PROBLEM — F41.9 ANXIETY: Status: ACTIVE | Noted: 2021-02-09

## 2022-01-06 PROBLEM — E11.69 HYPERLIPIDEMIA ASSOCIATED WITH TYPE 2 DIABETES MELLITUS: Status: RESOLVED | Noted: 2018-09-15 | Resolved: 2022-01-06

## 2022-01-06 PROBLEM — F32.A DEPRESSIVE DISORDER: Status: RESOLVED | Noted: 2021-02-09 | Resolved: 2022-01-06

## 2022-01-06 PROCEDURE — 99214 OFFICE O/P EST MOD 30 MIN: CPT | Performed by: NURSE PRACTITIONER

## 2022-01-06 PROCEDURE — 93000 ELECTROCARDIOGRAM COMPLETE: CPT | Performed by: NURSE PRACTITIONER

## 2022-01-06 RX ORDER — GLIMEPIRIDE 2 MG/1
1 TABLET ORAL 2 TIMES DAILY
COMMUNITY
Start: 2021-11-29 | End: 2022-02-27

## 2022-01-06 RX ORDER — ROPINIROLE 1 MG/1
2 TABLET, FILM COATED ORAL DAILY
COMMUNITY
Start: 2022-01-05

## 2022-01-06 RX ORDER — BUSPIRONE HYDROCHLORIDE 10 MG/1
1 TABLET ORAL 3 TIMES DAILY PRN
COMMUNITY
Start: 2021-09-13 | End: 2022-07-11 | Stop reason: ALTCHOICE

## 2022-01-06 RX ORDER — LEVOTHYROXINE SODIUM 175 UG/1
1 TABLET ORAL DAILY
COMMUNITY
Start: 2021-10-15 | End: 2022-01-06 | Stop reason: SDUPTHER

## 2022-01-06 NOTE — PROGRESS NOTES
Date of Office Visit: 2022  Encounter Provider: ANETA Marquez  Place of Service: [facility] CARDIOLOGY  Patient Name: Spencer Sinha  :1950  Primary Cardiologist: Dr. Argentina Prieto    Chief Complaint   Patient presents with   • Coronary Artery Disease     annual follow up exam       HPI: Spencer Sinha is a pleasant 71 y.o. male who presents today for annual cardiac follow-up on coronary artery disease.  I have reviewed his medical records.    He has been diagnosed with type 2 diabetes mellitus, Graves' disease, hypertension, and hyperlipidemia.  He has a known history of atrial fibrillation due to thyroid toxicosis.  Atrial fibrillation resolved when his thyroid was treated.    In , he had an abnormal nuclear stress test which showed anterior jeffry-infarct ischemia.  In , a cardiac catheterization showed severe LAD disease with good collateralization and medical treatment was recommended.  In , he underwent aortobifemoral bypass by Dr. Cem Solo. He was a previous cigarette smoker and quit in .    In , he had a CT scan recently which showed extensive coronary artery calcification and he was experiencing mild exertional dyspnea.  A nuclear stress test was obtained which showed a medium sized area of moderate to severe ischemia in the inferior apical distribution.  He underwent cardiac catheterization which showed the following: Normal left main, occluded LAD with left to left collaterals, 50% proximal and mid circumflex, 90% ostial RCA, 90% proximal and distal RCA.      In 2019, he underwent coronary artery bypass graft surgery x3 with LIMA to LAD, SVG to OM, and SVG to PDA by Dr. Crawford.  Dr. Argentina Prieto consulted on him during that hospitalization.  Carotid duplex showed bilateral plaque.  Postoperatively he had a brief episode of atrial fibrillation and was on a short course of amiodarone.     In , he was  experiencing lightheadedness and near syncope.  This resolved with better hydration.    Today is his follow-up visit.  He is drinking approximately 70 ounces of water daily and says that it is significantly helped his lightheadedness.  He denies chest pain, shortness of air, palpitations, edema, dizziness, near-syncope, syncope, or bleeding.  His blood pressure and heart rate are normal today.  In June 2021: Lipid panel showed total cholesterol 172, HDL 41, LDL 91, and triglycerides 199.  In July 2021, hemoglobin and hematocrit were normal.  In November 2021, BMP normal except for glucose of 293.  TSH was normal.      Past Medical History:   Diagnosis Date   • Atrial fibrillation ([facility])    • CAD (coronary artery disease)     S/p CABG on 02/8/19   • Chronic back pain     Hx Surgery   • Chronic knee pain     S/p Reg Total Knee Replacement   • Depression 07/2009    Trazadone   • Diplopia    • Discoid lupus erythematosus    • Generalized arthritis 1997   • Graves disease 12/2008    Hyperactive thyroid radiation   • Hyperlipidemia     Controlled w/Meds   • Hypertension 07/2008   • Hypogonadism, male    • Hypothyroidism, postablative    • Lactose intolerance 06/2017   • LAFB (left anterior fascicular block) 1/6/2022   • Left ventricular hypertrophy    • Long-term insulin use ([facility])    • Lung mass     R Lower Lung   • Metabolic disorder    • Near syncope 8/20/2020   • NSTEMI (non-ST elevated myocardial infarction) ([facility])    • Orthostatic hypotension 8/20/2020   • PAD (peripheral artery disease) ([facility]) 12/2007   • Right bundle branch block with left anterior fascicular block    • Syncope Hx   • Syncope and collapse 8/20/2020   • Thyrotoxicosis factitia    • Type 2 diabetes mellitus ([facility]) 1982       Past Surgical History:   Procedure Laterality Date   • ABDOMINAL AORTA STENT Bilateral     Using Vein-Aortofemoral   • BACK SURGERY     • CARDIAC CATHETERIZATION N/A 1/31/2019    Procedure: Coronary angiography;  Surgeon: Herrera  MD Hardik;  Location: [facility] CATH INVASIVE LOCATION;  Service: Cardiovascular   • CARDIAC CATHETERIZATION N/A 2019    Procedure: Left Heart Cath;  Surgeon: Hardik Silverman MD;  [facility];  Service: Cardiovascular   • CARDIAC CATHETERIZATION N/A 2019    Procedure: Left ventriculography;  Surgeon: Hardik Silverman MD;  [facility];  Service: Cardiovascular   • CATARACT EXTRACTION     • COLONOSCOPY     • CORONARY ARTERY BYPASS GRAFT N/A 2019    Procedure: THANG STERNOTOMY CORONARY ARTERY BYPASS GRAFT TIMES 3 USING LEFT INTERNAL MAMMARY ARTERY AND LEFT GREATER SAPHENOUS VEIN GRAFT PER ENDOSCOPIC VEIN GRAFT AND PRP.;  Surgeon: Nathaniel Crawford MD;  Location: [facility] MAIN OR;  Service: Cardiothoracic   • CORONARY STENT PLACEMENT  2007    PAD   • LUMBAR LAMINECTOMY  10/2015    And fusion L4/L5   • LUNG BIOPSY      Right lung mass   • OTHER SURGICAL HISTORY      Catheter Ablation   • REPLACEMENT TOTAL KNEE BILATERAL      10/2010 and 2010   • TONSILLECTOMY AND ADENOIDECTOMY         Social History     Socioeconomic History   • Marital status:    Tobacco Use   • Smoking status: Former Smoker     Packs/day: 1.00     Years: 45.00     Pack years: 45.00     Types: Cigarettes     Start date: 1970     Quit date:      Years since quittin.2   • Smokeless tobacco: Never Used   Substance and Sexual Activity   • Alcohol use: No     Comment: Caffeine use: 3-4 cups daily   • Drug use: No   • Sexual activity: Yes     Partners: Female     Birth control/protection: None       Family History   Problem Relation Age of Onset   • Coronary artery disease Other    • Malig Hyperthermia Neg Hx        The following portion of the patient's history were reviewed and updated as appropriate: past medical history, past surgical history, past social history, past family history, allergies, current medications, and problem list.    Review of Systems    Constitutional: Negative. Negative for chills, diaphoresis, fever, malaise/fatigue, night sweats, weight gain and weight loss.   HENT: Negative for hearing loss, nosebleeds, sore throat and tinnitus.    Eyes: Negative for blurred vision, double vision, pain and visual disturbance.   Cardiovascular: Negative.  Negative for chest pain, claudication, cyanosis, dyspnea on exertion, irregular heartbeat, leg swelling, near-syncope, orthopnea, palpitations, paroxysmal nocturnal dyspnea and syncope.   Respiratory: Negative.  Negative for cough, hemoptysis, shortness of breath, snoring and wheezing.    Endocrine: Negative for cold intolerance, heat intolerance and polyuria.   Hematologic/Lymphatic: Negative.  Negative for bleeding problem. Does not bruise/bleed easily.   Skin: Negative for color change, dry skin, flushing and itching.   Musculoskeletal: Negative for falls, joint pain, joint swelling, muscle cramps, muscle weakness and myalgias.   Gastrointestinal: Negative for abdominal pain, constipation, heartburn, melena, nausea and vomiting.   Genitourinary: Negative for dysuria and hematuria.   Neurological: Negative.  Negative for excessive daytime sleepiness, dizziness, light-headedness, loss of balance, numbness, paresthesias, seizures and vertigo.   Psychiatric/Behavioral: Negative for altered mental status, depression, memory loss and substance abuse. The patient does not have insomnia and is not nervous/anxious.    Allergic/Immunologic: Negative for environmental allergies.       Allergies   Allergen Reactions   • Tree Extract Shortness Of Breath   • Lactose Intolerance (Gi) GI Intolerance     Other reaction(s): GI Intolerance   • Mixed Grasses Other (See Comments)         Current Outpatient Medications:   •  ALPRAZolam (XANAX) 0.5 MG tablet, Take 0.25 mg by mouth 2 (Two) Times a Day., Disp: , Rfl:   •  aspirin 81 MG EC tablet, Take 81 mg by mouth Daily. HOLDING PER MD, Disp: , Rfl:   •  atorvastatin (LIPITOR)  "40 MG tablet, Take 1 tablet by mouth Daily., Disp: 90 tablet, Rfl: 2  •  Cholecalciferol (VITAMIN D3) 2000 units tablet, Take 1 tablet by mouth Daily., Disp: , Rfl:   •  clopidogrel (PLAVIX) 75 MG tablet, Take 75 mg by mouth Daily. HOLDING PER MD, Disp: , Rfl:   •  DULoxetine (CYMBALTA) 30 MG capsule, Take 30 mg by mouth Daily., Disp: , Rfl:   •  DULoxetine (CYMBALTA) 60 MG capsule, Take 60 mg by mouth Every Evening., Disp: , Rfl:   •  glimepiride (AMARYL) 2 MG tablet, TAKE ONE TABLET BY MOUTH DAILY AS DIRECTED (Patient taking differently: TAKE ONE 0.5MG TABLET BY MOUTH DAILY AS DIRECTED), Disp: 90 tablet, Rfl: 0  •  glucose blood (KIMBERLY CONTOUR TEST) test strip, Kimberly Contour Test In Vitro Strip; Patient Sig: Kimberly Contour Test In Vitro Strip ; 100; 0; 06-May-2012; Active, Disp: , Rfl:   •  Insulin Pen Needle (NOVOFINE) 32G X 6 MM misc, , Disp: , Rfl:   •  levothyroxine (SYNTHROID, LEVOTHROID) 175 MCG tablet, Take 175 mcg by mouth Daily., Disp: , Rfl:   •  metFORMIN (GLUCOPHAGE) 1000 MG tablet, TAKE ONE TABLET BY MOUTH TWICE A DAY, Disp: 180 tablet, Rfl: 0  •  metoprolol tartrate (LOPRESSOR) 50 MG tablet, Take 1 tablet by mouth Every 12 (Twelve) Hours for 90 days., Disp: 60 tablet, Rfl: 2  •  Multiple Vitamin (MULTI-DAY VITAMINS PO), Take 1 tablet by mouth Daily. HOLD PRIOR TO SURG, Disp: , Rfl:   •  Syringe/Needle, Disp, (B-D SYRINGE/NEEDLE 3CC/22GX1.5) 22G X 1-1/2\" 3 ML misc, , Disp: , Rfl:   •  Testosterone Cypionate 200 MG/ML kit, Inject  into the appropriate muscle as directed by prescriber Every 10 (Ten) Days., Disp: , Rfl:   •  traZODone (DESYREL) 50 MG tablet, Take 50 mg by mouth Every Night., Disp: , Rfl:   •  VICTOZA 18 MG/3ML solution pen-injector injection, DIAL AND INJECT SUBCUTANEOUSLY 1.8MG DAILY, Disp: 9 mL, Rfl: 3  •  Liraglutide (VICTOZA) 18 MG/3ML solution pen-injector injection, Inject 1.8 mg under the skin into the appropriate area as directed Daily., Disp: , Rfl:           Objective: " "    Vitals:    01/06/22 0922 01/06/22 0933   BP: 134/80 138/80   BP Location: Left arm Right arm   Patient Position: Sitting Sitting   Cuff Size: Adult Adult   Pulse: 72    Resp: 16    SpO2: 96%    Weight: 89.8 kg (198 lb)    Height: 180.3 cm (70.98\")      Body mass index is 27.63 kg/m².    PHYSICAL EXAM:    Vitals Reviewed.   General Appearance: No acute distress, well developed and well nourished.   Eyes: Conjunctiva and lids: No erythema, swelling, or discharge. Sclera non-icteric.   HENT: Atraumatic, normocephalic. External eyes, ears, and nose normal. No hearing loss noted. Mucous membranes normal. Lips not cyanotic. Neck supple with no tenderness.  Wearing a mask.  Respiratory: No signs of respiratory distress. Respiration rhythm and depth normal.   Clear to auscultation. No rales, crackles, rhonchi, or wheezing auscultated.   Cardiovascular:  Jugular Venous Pressure: Normal  Heart Rate and Rhythm: Normal, Heart Sounds: Normal S1 and S2. No S3 or S4 noted.  Murmurs: No murmurs noted. No rubs, thrills, or gallops.   Chest incision well-healed.  Lower Extremities: No edema noted.  Gastrointestinal:  Abdomen soft, non-distended, non-tender. Normal bowel sounds. No hepatomegaly.   Musculoskeletal: Normal movement of extremities  Skin and Nails: General appearance normal. No pallor, cyanosis, diaphoresis. Skin temperature normal. No clubbing of fingernails.  Yellow bruise on right upper arm.  Psychiatric: Patient alert and oriented to person, place, and time. Speech and behavior appropriate. Normal mood and affect.       ECG 12 Lead    Date/Time: 1/6/2022 9:30 AM  Performed by: Michelle Wen APRN  Authorized by: Michelle Wen APRN   Comparison: compared with previous ECG from 12/23/2020  Similar to previous ECG  Rhythm: sinus rhythm  Rate: normal  BPM: 65  Conduction: left anterior fascicular block  QRS axis: normal  Other findings: non-specific ST-T wave changes    Clinical impression: abnormal " EKG              Assessment:       Diagnosis Plan   1. Chronic coronary artery disease     2. Primary hypertension     3. Mixed hyperlipidemia     4. PAF (paroxysmal atrial fibrillation) (HCC)     5. Peripheral vascular disease (HCC)     6. LAFB (left anterior fascicular block)            Plan:       1.  Coronary Artery Disease/CABG: He status post CABG x3 in 2019.  Continue aspirin, atorvastatin, and clopidogrel.  Denies angina.     2.  Hypertension: Blood pressure well controlled today.    3.  Hyperlipidemia: He remains on atorvastatin with LDL goal less than 70.      4.  Postoperative Atrial Fibrillation: No recurrence documented since open heart surgery.    5.  Peripheral Vascular Disease: Remains on aspirin, clopidogrel, and atorvastatin.  Followed by Dr. Gisela Hutchison.  He denies claudication symptoms.    6.  Left Anterior Fascicular Block: Chronic and unchanged.    7.  I recommend follow-up with Dr. Argentina Prieto in 1 year, unless otherwise needed sooner.    As always, it has been a pleasure to participate in your patient's care. Thank you.       Sincerely,         ANETA Liu      · Dictated using Dragon  · COVID-19 Precautions - Patient was compliant in wearing a mask. When I saw the patient, I used appropriate personal protective equipment (PPE) including mask (standard procedure).  Additionally, I used gown, gloves, and eye shield if indicated.  Hand hygiene was completed before and after seeing the patient.  I spent 30 minutes reviewing her medical records/testing/previous office notes/labs, face-to-face interaction with patient, physical examination, formulating the plan of care, and discussion of plan of care with patient.

## 2022-07-11 ENCOUNTER — OFFICE VISIT (OUTPATIENT)
Dept: CARDIOLOGY | Facility: CLINIC | Age: 72
End: 2022-07-11

## 2022-07-11 VITALS
HEART RATE: 63 BPM | BODY MASS INDEX: 28.77 KG/M2 | OXYGEN SATURATION: 98 % | SYSTOLIC BLOOD PRESSURE: 130 MMHG | HEIGHT: 70 IN | WEIGHT: 201 LBS | DIASTOLIC BLOOD PRESSURE: 60 MMHG

## 2022-07-11 DIAGNOSIS — I48.0 PAF (PAROXYSMAL ATRIAL FIBRILLATION): ICD-10-CM

## 2022-07-11 DIAGNOSIS — I73.9 PERIPHERAL VASCULAR DISEASE: ICD-10-CM

## 2022-07-11 DIAGNOSIS — E78.2 MIXED HYPERLIPIDEMIA: ICD-10-CM

## 2022-07-11 DIAGNOSIS — I25.10 CHRONIC CORONARY ARTERY DISEASE: Primary | ICD-10-CM

## 2022-07-11 DIAGNOSIS — I10 PRIMARY HYPERTENSION: ICD-10-CM

## 2022-07-11 PROCEDURE — 99214 OFFICE O/P EST MOD 30 MIN: CPT | Performed by: NURSE PRACTITIONER

## 2022-07-11 PROCEDURE — 93000 ELECTROCARDIOGRAM COMPLETE: CPT | Performed by: NURSE PRACTITIONER

## 2022-07-11 RX ORDER — MULTIVIT WITH MINERALS/LUTEIN
1000 TABLET ORAL DAILY
COMMUNITY

## 2022-07-11 RX ORDER — CALCIUM CARBONATE/VITAMIN D3 500-10/5ML
LIQUID (ML) ORAL
COMMUNITY
Start: 2022-02-01

## 2022-07-11 NOTE — PROGRESS NOTES
[facility] Follow Up Office Note     Encounter Date:22  Patient:Spencer Sinha  :1950  MRN:[id number]      Chief Complaint:   Chief Complaint   Patient presents with   • Shortness of Breath   • back problems     Disc in back , SOA, Jump in back .   • Follow-up       History of Presenting Illness:        Spencer Sinha is a 72 y.o. male who is here for follow-up of CAD .  He is a patient of [facility].    Patient has known CAD. In  left heart cath showed severe LAD disease with good collaterals and medical therapy was recommended.  In , he began experiencing dyspnea with exertion and stress testing was abnormal. He underwent LHC demonstrating normal LM, occluded LAD with left to left collateral, 50% proximal and mid circumflex disease, 90% ostial RCA and 90% proximal and distal RCA.  He is s/p CABG x3 2019 with LIMA to LAD, SVG to OM, SVG to PDA.      Patient also has diagnosis of PVD with prior aortobifemoral bypass in , DM II, Graves' disease, hypertension and hyperlipidemia. He has history of PAF, due to thyroid toxicosis and again post-op which resolved. He was a previous cigarette smoker and quit in . In  the patient experienced lightheadedness and near syncope which resolved with hydration.    He was seen for follow-up By ANETA Liu in 2022.    Patient has requested appointment today with complaint of increased dyspnea on exertion.  He was also seen by PCP recently with recommended cardiology follow-up for this problem.  He has noticed progressive increase in shortness of breath with his normal activities, including climbing 2 flights of stairs going up from his garage to his home.  He does have history of lung cancer with prior thoracotomy in .  He has follow-up with pulmonology scheduled and MRI next week.  He denies chest tightness, he did not have chest tightness with prior MI.  He denies lower extremity edema, orthopnea, palpitations.   He has mild lightheadedness with position changes which is chronic for him, he drinks 64 ounces of water daily.    Review of Systems:  Review of Systems   Cardiovascular: Positive for dyspnea on exertion. Negative for chest pain, leg swelling, orthopnea and palpitations.   Respiratory: Negative for shortness of breath.        Current Outpatient Medications on File Prior to Visit   Medication Sig Dispense Refill   • albuterol sulfate  (90 Base) MCG/ACT inhaler      • aspirin 81 MG EC tablet Take 81 mg by mouth Daily. HOLDING PER MD     • atorvastatin (LIPITOR) 40 MG tablet TAKE ONE TABLET BY MOUTH DAILY 90 tablet 0   • Cholecalciferol (VITAMIN D3) 2000 units tablet Take 1 tablet by mouth Daily.     • clopidogrel (PLAVIX) 75 MG tablet Take 75 mg by mouth Daily. HOLDING PER MD     • DULoxetine (CYMBALTA) 60 MG capsule Take 60 mg by mouth Every Evening.     • fluticasone (FLONASE) 50 MCG/ACT nasal spray      • glucose blood test strip Kimberly Contour Test In Vitro Strip; Patient Sig: Kimberly Contour Test In Vitro Strip ; 100; 0; 06-May-2012; Active     • Insulin Pen Needle 32G X 6 MM misc      • levothyroxine (SYNTHROID, LEVOTHROID) 150 MCG tablet Take 1 tablet by mouth Daily. (Patient taking differently: Take 175 mcg by mouth Daily.) 90 tablet 2   • metFORMIN (GLUCOPHAGE) 1000 MG tablet TAKE ONE TABLET BY MOUTH TWO TIMES A  tablet 0   • rOPINIRole (REQUIP) 1 MG tablet Take 1 mg by mouth Daily.     • Zinc 30 MG capsule      • [DISCONTINUED] busPIRone (BUSPAR) 10 MG tablet Take 1 tablet by mouth 3 (Three) Times a Day As Needed.     • [DISCONTINUED] Dulaglutide 0.75 MG/0.5ML solution pen-injector Inject 0.75 mg under the skin into the appropriate area as directed.     • [DISCONTINUED] Multiple Vitamin (MULTI-DAY VITAMINS PO) Take 1 tablet by mouth Daily. HOLD PRIOR TO SURG     • vitamin C (ASCORBIC ACID) 250 MG tablet Take 1,000 mg by mouth Daily.       No current facility-administered medications on file prior  to visit.       Allergies   Allergen Reactions   • Tree Extract Shortness Of Breath   • Lactose Intolerance (Gi) GI Intolerance     Other reaction(s): GI Intolerance   • Mixed Grasses Other (See Comments)       Past Medical History:   Diagnosis Date   • Atrial fibrillation ([facility])    • CAD (coronary artery disease)     S/p CABG on 02/8/19   • Chronic back pain     Hx Surgery   • Chronic knee pain     S/p Reg Total Knee Replacement   • Depression 07/2009    Trazadone   • Diplopia    • Discoid lupus erythematosus    • Generalized arthritis 1997   • Graves disease 12/2008    Hyperactive thyroid radiation   • Hyperlipidemia     Controlled w/Meds   • Hypertension 07/2008   • Hypogonadism, male    • Hypothyroidism, postablative    • Lactose intolerance 06/2017   • LAFB (left anterior fascicular block) 1/6/2022   • Left ventricular hypertrophy    • Long-term insulin use ([facility])    • Lung mass     R Lower Lung   • Metabolic disorder    • Near syncope 8/20/2020   • NSTEMI (non-ST elevated myocardial infarction) ([facility])    • Orthostatic hypotension 8/20/2020   • PAD (peripheral artery disease) ([facility]) 12/2007   • Right bundle branch block with left anterior fascicular block    • Syncope Hx   • Syncope and collapse 8/20/2020   • Thyrotoxicosis factitia    • Type 2 diabetes mellitus ([facility]) 1982       Past Surgical History:   Procedure Laterality Date   • ABDOMINAL AORTA STENT Bilateral     Using Vein-Aortofemoral   • BACK SURGERY     • CARDIAC CATHETERIZATION N/A 1/31/2019    Procedure: Coronary angiography;  Surgeon: Hardik Silverman MD;  Location: [facility] CATH INVASIVE LOCATION;  Service: Cardiovascular   • CARDIAC CATHETERIZATION N/A 1/31/2019    Procedure: Left Heart Cath;  Surgeon: Hardik Silverman MD;  Location: [facility] CATH INVASIVE LOCATION;  Service: Cardiovascular   • CARDIAC CATHETERIZATION N/A 1/31/2019    Procedure: Left ventriculography;  Surgeon: Hardik Silverman MD;  Location: [facility] CATH INVASIVE LOCATION;  Service:  "Cardiovascular   • CATARACT EXTRACTION     • COLONOSCOPY     • CORONARY ARTERY BYPASS GRAFT N/A 2019    Procedure: THANG STERNOTOMY CORONARY ARTERY BYPASS GRAFT TIMES 3 USING LEFT INTERNAL MAMMARY ARTERY AND LEFT GREATER SAPHENOUS VEIN GRAFT PER ENDOSCOPIC VEIN GRAFT AND PRP.;  Surgeon: Nathaniel Crawford MD;  Location: [facility];  Service: Cardiothoracic   • CORONARY STENT PLACEMENT  2007    PAD   • LUMBAR LAMINECTOMY  10/2015    And fusion L4/L5   • LUNG BIOPSY      Right lung mass   • OTHER SURGICAL HISTORY      Catheter Ablation   • REPLACEMENT TOTAL KNEE BILATERAL      10/2010 and 2010   • TONSILLECTOMY AND ADENOIDECTOMY         Social History     Socioeconomic History   • Marital status:    Tobacco Use   • Smoking status: Former Smoker     Packs/day: 1.00     Years: 45.00     Pack years: 45.00     Types: Cigarettes     Start date: 1970     Quit date:      Years since quittin.7   • Smokeless tobacco: Never Used   Substance and Sexual Activity   • Alcohol use: No     Comment: Caffeine use: 3-4 cups daily   • Drug use: No   • Sexual activity: Yes     Partners: Female     Birth control/protection: None       Family History   Problem Relation Age of Onset   • Coronary artery disease Other    • Malig Hyperthermia Neg Hx        The following portions of the patient's history were reviewed and updated as appropriate: allergies, current medications, past family history, past medical history, past social history, past surgical history and problem list.       Objective:       Vitals:    22 0928   BP: 130/60   BP Location: Left arm   Patient Position: Sitting   Cuff Size: Adult   Pulse: 63   SpO2: 98%   Weight: 91.2 kg (201 lb)   Height: 177.8 cm (70\")         Physical Exam:  Constitutional: Well appearing, well developed, no acute distress   HENT: Oropharynx clear and membrane moist  Eyes: Normal conjunctiva, no sclera icterus  Neck: Supple, no carotid bruit " bilaterally  Cardiovascular: Regular rate and rhythm, No Murmur, No bilateral lower extremity edema  Pulmonary: Normal respiratory effort, normal lung sounds, no wheezing  Neurological: Alert and orient x 3  Skin: Warm, dry, no ecchymosis, no rash  Psych: Appropriate mood and affect. Normal judgment and insight         Lab Results   Component Value Date     06/30/2022     04/05/2022    K 4.7 06/30/2022    K 4.6 04/05/2022     06/30/2022     04/05/2022    CO2 25 06/30/2022    CO2 23 04/05/2022    BUN 21 06/30/2022    BUN 17 04/05/2022    CREATININE 1.11 06/30/2022    CREATININE 153.0 04/05/2022    CREATININE 1.11 04/05/2022    EGFRIFNONA 63 06/12/2020    EGFRIFNONA 65 11/01/2019    EGFRIFAFRI 77 06/12/2020    EGFRIFAFRI 79 11/01/2019    GLUCOSE 177 (H) 06/12/2020    GLUCOSE 115 (H) 11/01/2019    CALCIUM 9.0 06/30/2022    CALCIUM 9.4 04/05/2022    PROTENTOTREF 7.0 06/12/2020    PROTENTOTREF 7.0 11/01/2019    ALBUMIN 4.4 04/05/2022    ALBUMIN 3.9 06/30/2021    BILITOT 0.5 04/05/2022    BILITOT 0.3 06/30/2021    AST 22 04/05/2022    AST 26 06/30/2021    ALT 24 04/05/2022    ALT 31 06/30/2021     Lab Results   Component Value Date    WBC 7.12 06/30/2022    WBC 6.14 01/24/2022    HGB 13.1 (L) 06/30/2022    HGB 15.0 01/24/2022    HCT 41.9 06/30/2022    HCT 46.3 01/24/2022    MCV 89.1 06/30/2022    MCV 84.3 01/24/2022     06/30/2022     01/24/2022     Lab Results   Component Value Date    CHOL 132 02/07/2019    TRIG 135 06/12/2020    TRIG 193 (H) 02/07/2019    HDL 42 06/12/2020    HDL 37 (L) 02/07/2019    LDL 93 06/12/2020    LDL 56 02/07/2019     Lab Results   Component Value Date    PROBNP 25.1 02/07/2019     No results found for: CKTOTAL, CKMB, CKMBINDEX, TROPONINI, TROPONINT  Lab Results   Component Value Date    TSH 0.531 06/30/2021    TSH 3.340 06/12/2020           ECG 12 Lead    Date/Time: 7/11/2022 9:35 AM  Performed by: Denisse Steward APRN  Authorized by: Denisse Steward  P, APRN   Comparison: compared with previous ECG from 1/6/2022  Similar to previous ECG  Rhythm: sinus rhythm  Rate: normal  ST Segments: ST segments normal  Other findings: non-specific ST-T wave changes          CABG x3 2/2019    Carotid doppler 2/2019:  · Proximal right internal carotid artery plaque without significant stenosis.  · Proximal left internal carotid artery plaque without significant stenosis.    [facility] 1/31/2019:  Left main: Normal  Left anterior descending: Normal proximally 100% occluded at the third septal  left to left collaterals large first diagonal 20% origin disease  Ramus intermedius:Not present  Circumflex: Diffuse 50% proximal and mid circumflex disease this is a large vessel  RCA: Is a dominant vessel.  90% ostial lesion 90% proximal lesion 30% mid 90% distal lesion diffuse 30-40% disease in the proximal PDA     SUMMARY: Normal LV function severe three-vessel disease diabetes     RECOMMENDATIONS: Will discuss with Dr. Ramírez as well as the patient about treatment options of medical therapy versus surgery       Assessment:          Diagnosis Plan   1. Chronic coronary artery disease  ECG 12 Lead    Stress Test With Pet Myocardial Perfusion (MULTI STUDY, REST AND STRESS)   2. Mixed hyperlipidemia     3. Primary hypertension     4. PAF (paroxysmal atrial fibrillation) ([facility])     5. Peripheral vascular disease (HCC)            Plan:       CAD with prior CABG 2019- medical therapy aspirin, Plavix, statin.  Patient is having progressive increase of dyspnea on exertion, no chest pain or tightness.  Previously did not have chest pain or tightness.  EKG with T wave inversions, he had T wave flattening in these areas prior.  I am scheduling him for nuclear stress test.  I will follow-up with him regarding these results    Hyperlipidemia- continue atorvastatin 40 mg.  Lipid panel 4/2022 demonstrates good control    Hypertension- BP is controlled today.  Continue current regimen    Postop A.  fib- no noted recurrent episodes.  Denies palpitations    Peripheral vascular disease- denies claudication.    Patient is seen today for follow-up.  He is complaining of increased dyspnea with exertion.  This is been progressive.  He denies chest tightness.  He does not have any symptoms of heart failure.  With his cardiac history I think it is reasonable to check a stress test.  I will call him with the results of this and follow-up schedule.    Orders Placed This Encounter   Procedures   • Stress Test With Pet Myocardial Perfusion (MULTI STUDY, REST AND STRESS)     Standing Status:   Future     Standing Expiration Date:   7/11/2023     Order Specific Question:   What stress agent will be used?     Answer:   Regadenoson (Lexiscan)     Order Specific Question:   Difficulty walking criteria?     Answer:   Poor Exercise Tolerance     Order Specific Question:   Reason for exam?     Answer:   Chest Pain     Order Specific Question:   Release to patient     Answer:   Immediate   • ECG 12 Lead     This order was created via procedure documentation     Order Specific Question:   Release to patient     Answer:   Immediate            ANETA Jimenez  [facility]  07/11/22  10:52 EDT

## 2022-07-13 ENCOUNTER — HOSPITAL ENCOUNTER (OUTPATIENT)
Dept: CARDIOLOGY | Facility: HOSPITAL | Age: 72
Discharge: HOME | End: 2022-07-13
Admitting: NURSE PRACTITIONER

## 2022-07-13 VITALS — BODY MASS INDEX: 28.78 KG/M2 | WEIGHT: 201.06 LBS | HEIGHT: 70 IN

## 2022-07-13 DIAGNOSIS — I25.10 CHRONIC CORONARY ARTERY DISEASE: ICD-10-CM

## 2022-07-13 LAB
BH CV NUCLEAR PRIOR STUDY: 2
BH CV REST NUCLEAR ISOTOPE DOSE: 60 MCI
BH CV STRESS BP STAGE 1: NORMAL
BH CV STRESS COMMENTS STAGE 1: NORMAL
BH CV STRESS DOSE REGADENOSON STAGE 1: 0.4
BH CV STRESS DURATION MIN STAGE 1: 0
BH CV STRESS DURATION SEC STAGE 1: 10
BH CV STRESS HR STAGE 1: 72
BH CV STRESS NUCLEAR ISOTOPE DOSE: 60 MCI
BH CV STRESS PROTOCOL 1: NORMAL
BH CV STRESS RECOVERY BP: NORMAL MMHG
BH CV STRESS RECOVERY HR: 67 BPM
BH CV STRESS STAGE 1: 1
LV EF NUC BP: 74 %
MAXIMAL PREDICTED HEART RATE: 147 BPM
PERCENT MAX PREDICTED HR: 48.98 %
STRESS BASELINE BP: NORMAL MMHG
STRESS BASELINE HR: 59 BPM
STRESS PERCENT HR: 58 %
STRESS POST EXERCISE DUR SEC: 10 SEC
STRESS POST PEAK BP: NORMAL MMHG
STRESS POST PEAK HR: 72 BPM
STRESS TARGET HR: 125 BPM

## 2022-07-13 PROCEDURE — 93016 CV STRESS TEST SUPVJ ONLY: CPT | Performed by: INTERNAL MEDICINE

## 2022-07-13 PROCEDURE — 25010000002 REGADENOSON 0.4 MG/5ML SOLUTION: Performed by: NURSE PRACTITIONER

## 2022-07-13 PROCEDURE — 78492 MYOCRD IMG PET MLT RST&STRS: CPT

## 2022-07-13 PROCEDURE — 0 RUBIDIUM CHLORIDE: Performed by: NURSE PRACTITIONER

## 2022-07-13 PROCEDURE — A9555 RB82 RUBIDIUM: HCPCS | Performed by: NURSE PRACTITIONER

## 2022-07-13 PROCEDURE — 93017 CV STRESS TEST TRACING ONLY: CPT

## 2022-07-13 PROCEDURE — 93018 CV STRESS TEST I&R ONLY: CPT | Performed by: INTERNAL MEDICINE

## 2022-07-13 PROCEDURE — 78492 MYOCRD IMG PET MLT RST&STRS: CPT | Performed by: INTERNAL MEDICINE

## 2022-07-13 RX ADMIN — REGADENOSON 0.4 MG: 0.08 INJECTION, SOLUTION INTRAVENOUS at 07:49

## 2022-07-13 NOTE — PROGRESS NOTES
Please let the patient know that his stress test was normal meaning that there is a low risk of a significant coronary blockage.  The squeezing function of his heart is normal.

## 2022-10-06 ENCOUNTER — OFFICE VISIT (OUTPATIENT)
Dept: CARDIOLOGY | Facility: CLINIC | Age: 72
End: 2022-10-06

## 2022-10-06 VITALS
DIASTOLIC BLOOD PRESSURE: 80 MMHG | OXYGEN SATURATION: 98 % | HEART RATE: 73 BPM | WEIGHT: 204.4 LBS | BODY MASS INDEX: 29.26 KG/M2 | HEIGHT: 70 IN | SYSTOLIC BLOOD PRESSURE: 140 MMHG

## 2022-10-06 DIAGNOSIS — I25.10 CHRONIC CORONARY ARTERY DISEASE: Primary | ICD-10-CM

## 2022-10-06 PROCEDURE — 93000 ELECTROCARDIOGRAM COMPLETE: CPT | Performed by: NURSE PRACTITIONER

## 2022-10-06 PROCEDURE — 99214 OFFICE O/P EST MOD 30 MIN: CPT | Performed by: NURSE PRACTITIONER

## 2022-10-06 RX ORDER — INSULIN GLARGINE 100 [IU]/ML
INJECTION, SOLUTION SUBCUTANEOUS
COMMUNITY
Start: 2022-09-07 | End: 2023-01-10

## 2022-10-06 RX ORDER — DULAGLUTIDE 1.5 MG/.5ML
INJECTION, SOLUTION SUBCUTANEOUS
COMMUNITY
Start: 2022-10-01

## 2022-10-06 RX ORDER — GABAPENTIN 300 MG/1
CAPSULE ORAL
COMMUNITY
Start: 2022-09-19

## 2022-10-06 RX ORDER — GLIMEPIRIDE 4 MG/1
TABLET ORAL
COMMUNITY
Start: 2022-08-19

## 2022-10-06 RX ORDER — BACLOFEN 5 MG/1
5 TABLET ORAL
COMMUNITY
Start: 2022-08-15

## 2022-10-06 RX ORDER — ROSUVASTATIN CALCIUM 40 MG/1
TABLET, COATED ORAL
COMMUNITY
Start: 2022-09-13

## 2022-10-06 NOTE — PROGRESS NOTES
[facility] Follow Up Office Note     Encounter Date:10/06/22  Patient:Spencer Sinha  :1950  MRN:[id number]      Chief Complaint:   Chief Complaint   Patient presents with   • Follow-up       History of Presenting Illness:        Spencer Sinha is a 72 y.o. male who is here for follow-up.  He is a patient of [facility].    Patient has known CAD. In  left heart cath showed severe LAD disease with good collaterals and medical therapy was recommended.  In , he began experiencing dyspnea with exertion and stress testing was abnormal. He underwent LHC demonstrating normal LM, occluded LAD with left to left collateral, 50% proximal and mid circumflex disease, 90% ostial RCA and 90% proximal and distal RCA.  He is s/p CABG x3 2019 with LIMA to LAD, SVG to OM, SVG to PDA.      Patient also has diagnosis of PVD with prior aortobifemoral bypass in , DM II, Graves' disease, hypertension,  hyperlipidemia, lung cancer with prior thoracotomy in .  He has history of PAF, due to thyroid toxicosis and again post-op in  without recurrence. He was a previous cigarette smoker and quit in .     I saw patient in July of this year due to complaint increased dyspnea on exertion.  Stress MPI 2022 without evidence of ischemia.    Patient was recently admitted to [facility] for concussion.  While admitted he had carotid dopplers with < 50% stenosis bilaterally and echo which demonstrated normal LVEF with mild AS.      Patient reports he fell from nearly the top of an 8 foot ladder while cleaning gutters. He reports he believes he lost his balance but does not actually remember anything about falling until he woke up on the ground.  He denies palpitations, dizziness, chest pain, increasing dyspnea, lower extremity edema or orthopnea.  He has residual double vision and right neck and shoulder pain.  He sees his PCP tomorrow for follow-up.      Review of Systems:  Review of Systems    Cardiovascular: Negative for chest pain, dyspnea on exertion, leg swelling, orthopnea and palpitations.   Respiratory: Negative for shortness of breath.    Musculoskeletal: Positive for neck pain.       Current Outpatient Medications on File Prior to Visit   Medication Sig Dispense Refill   • albuterol sulfate  (90 Base) MCG/ACT inhaler      • aspirin 81 MG EC tablet Take 81 mg by mouth Daily. HOLDING PER MD     • atorvastatin (LIPITOR) 40 MG tablet TAKE ONE TABLET BY MOUTH DAILY 90 tablet 0   • Baclofen 5 MG tablet Take 5 mg by mouth.     • Cholecalciferol (VITAMIN D3) 2000 units tablet Take 1 tablet by mouth Daily.     • clopidogrel (PLAVIX) 75 MG tablet Take 75 mg by mouth Daily. HOLDING PER MD     • DULoxetine (CYMBALTA) 60 MG capsule Take 60 mg by mouth Every Evening.     • fluticasone (FLONASE) 50 MCG/ACT nasal spray      • gabapentin (NEURONTIN) 300 MG capsule      • glimepiride (AMARYL) 4 MG tablet      • glucose blood test strip Kimberly Contour Test In Vitro Strip; Patient Sig: Kimberly Contour Test In Vitro Strip ; 100; 0; 06-May-2012; Active     • Insulin Glargine (BASAGLAR KWIKPEN) 100 UNIT/ML injection pen      • Insulin Pen Needle 32G X 6 MM misc      • levothyroxine (SYNTHROID, LEVOTHROID) 150 MCG tablet Take 1 tablet by mouth Daily. (Patient taking differently: Take 175 mcg by mouth Daily.) 90 tablet 2   • metFORMIN (GLUCOPHAGE) 1000 MG tablet TAKE ONE TABLET BY MOUTH TWO TIMES A  tablet 0   • rOPINIRole (REQUIP) 1 MG tablet Take 1 mg by mouth Daily.     • rosuvastatin (CRESTOR) 40 MG tablet      • Trulicity 1.5 MG/0.5ML solution pen-injector      • vitamin C (ASCORBIC ACID) 250 MG tablet Take 1,000 mg by mouth Daily.     • Zinc 30 MG capsule        No current facility-administered medications on file prior to visit.       Allergies   Allergen Reactions   • Tree Extract Shortness Of Breath   • Lactose Intolerance (Gi) GI Intolerance     Other reaction(s): GI Intolerance   • Mixed Grasses  Other (See Comments)       Past Medical History:   Diagnosis Date   • Atrial fibrillation ([facility])    • CAD (coronary artery disease)     S/p CABG on 02/8/19   • Chronic back pain     Hx Surgery   • Chronic knee pain     S/p Reg Total Knee Replacement   • Depression 07/2009    Trazadone   • Diplopia    • Discoid lupus erythematosus    • Generalized arthritis 1997   • Graves disease 12/2008    Hyperactive thyroid radiation   • Hyperlipidemia     Controlled w/Meds   • Hypertension 07/2008   • Hypogonadism, male    • Hypothyroidism, postablative    • Lactose intolerance 06/2017   • LAFB (left anterior fascicular block) 1/6/2022   • Left ventricular hypertrophy    • Long-term insulin use ([facility])    • Lung mass     R Lower Lung   • Metabolic disorder    • Near syncope 8/20/2020   • NSTEMI (non-ST elevated myocardial infarction) ([facility])    • Orthostatic hypotension 8/20/2020   • PAD (peripheral artery disease) ([facility]) 12/2007   • Right bundle branch block with left anterior fascicular block    • Syncope Hx   • Syncope and collapse 8/20/2020   • Thyrotoxicosis factitia    • Type 2 diabetes mellitus ([facility]) 1982       Past Surgical History:   Procedure Laterality Date   • ABDOMINAL AORTA STENT Bilateral     Using Vein-Aortofemoral   • BACK SURGERY     • CARDIAC CATHETERIZATION N/A 1/31/2019    Procedure: Coronary angiography;  Surgeon: Hardik Silverman MD;  Location: [facility] CATH INVASIVE LOCATION;  Service: Cardiovascular   • CARDIAC CATHETERIZATION N/A 1/31/2019    Procedure: Left Heart Cath;  Surgeon: Hardik Silverman MD;  Location: [facility] CATH INVASIVE LOCATION;  Service: Cardiovascular   • CARDIAC CATHETERIZATION N/A 1/31/2019    Procedure: Left ventriculography;  Surgeon: Hardik Silverman MD;  Location: [facility] CATH INVASIVE LOCATION;  Service: Cardiovascular   • CATARACT EXTRACTION  2018   • COLONOSCOPY     • CORONARY ARTERY BYPASS GRAFT N/A 2/8/2019    Procedure: THANG STERNOTOMY CORONARY ARTERY BYPASS GRAFT TIMES 3 USING LEFT  "INTERNAL MAMMARY ARTERY AND LEFT GREATER SAPHENOUS VEIN GRAFT PER ENDOSCOPIC VEIN GRAFT AND PRP.;  Surgeon: Nathaniel Crawford MD;  Location: [facility];  Service: Cardiothoracic   • CORONARY STENT PLACEMENT  2007    PAD   • LUMBAR LAMINECTOMY  10/2015    And fusion L4/L5   • LUNG BIOPSY      Right lung mass   • OTHER SURGICAL HISTORY      Catheter Ablation   • REPLACEMENT TOTAL KNEE BILATERAL      10/2010 and 2010   • TONSILLECTOMY AND ADENOIDECTOMY         Social History     Socioeconomic History   • Marital status:    Tobacco Use   • Smoking status: Former Smoker     Packs/day: 1.00     Years: 45.00     Pack years: 45.00     Types: Cigarettes     Start date: 1970     Quit date:      Years since quittin.7   • Smokeless tobacco: Never Used   Substance and Sexual Activity   • Alcohol use: No     Comment: Caffeine use: 3-4 cups daily   • Drug use: No   • Sexual activity: Yes     Partners: Female     Birth control/protection: None       Family History   Problem Relation Age of Onset   • Coronary artery disease Other    • Malig Hyperthermia Neg Hx        The following portions of the patient's history were reviewed and updated as appropriate: allergies, current medications, past family history, past medical history, past social history, past surgical history and problem list.       Objective:       Vitals:    10/06/22 1138   BP: 140/80   BP Location: Right arm   Patient Position: Sitting   Cuff Size: Adult   Pulse: 73   SpO2: 98%   Weight: 92.7 kg (204 lb 6.4 oz)   Height: 177.8 cm (70\")         Physical Exam:  Constitutional: Well appearing, well developed, no acute distress   HENT: Oropharynx clear and membrane moist  Eyes: Normal conjunctiva, no sclera icterus  Neck: Supple, no carotid bruit bilaterally  Cardiovascular: Regular rate and rhythm, No Murmur, No bilateral lower extremity edema  Pulmonary: Normal respiratory effort, normal lung sounds  Neurological: Alert and orient x " 3  Skin: Warm, dry, no ecchymosis, no rash  Psych: Appropriate mood and affect. Normal judgment and insight         Lab Results   Component Value Date     06/30/2022     04/05/2022    K 4.7 06/30/2022    K 4.6 04/05/2022     06/30/2022     04/05/2022    CO2 25 06/30/2022    CO2 23 04/05/2022    BUN 21 06/30/2022    BUN 17 04/05/2022    CREATININE 1.11 06/30/2022    CREATININE 153.0 04/05/2022    CREATININE 1.11 04/05/2022    EGFRIFNONA 63 06/12/2020    EGFRIFNONA 65 11/01/2019    EGFRIFAFRI 77 06/12/2020    EGFRIFAFRI 79 11/01/2019    GLUCOSE 177 (H) 06/12/2020    GLUCOSE 115 (H) 11/01/2019    CALCIUM 9.0 06/30/2022    CALCIUM 9.4 04/05/2022    PROTENTOTREF 7.0 06/12/2020    PROTENTOTREF 7.0 11/01/2019    ALBUMIN 4.4 04/05/2022    ALBUMIN 3.9 06/30/2021    BILITOT 0.5 04/05/2022    BILITOT 0.3 06/30/2021    AST 22 04/05/2022    AST 26 06/30/2021    ALT 24 04/05/2022    ALT 31 06/30/2021     Lab Results   Component Value Date    WBC 7.12 06/30/2022    WBC 6.14 01/24/2022    HGB 13.1 (L) 06/30/2022    HGB 15.0 01/24/2022    HCT 41.9 06/30/2022    HCT 46.3 01/24/2022    MCV 89.1 06/30/2022    MCV 84.3 01/24/2022     06/30/2022     01/24/2022     Lab Results   Component Value Date    CHOL 132 02/07/2019    TRIG 135 06/12/2020    TRIG 193 (H) 02/07/2019    HDL 42 06/12/2020    HDL 37 (L) 02/07/2019    LDL 93 06/12/2020    LDL 56 02/07/2019     Lab Results   Component Value Date    PROBNP 25.1 02/07/2019     No results found for: CKTOTAL, CKMB, CKMBINDEX, TROPONINI, TROPONINT  Lab Results   Component Value Date    TSH 0.531 06/30/2021    TSH 3.340 06/12/2020           ECG 12 Lead    Date/Time: 10/6/2022 11:46 AM  Performed by: Denisse Steward APRN  Authorized by: Denisse Steward APRN   Comparison: compared with previous ECG from 7/11/2022  Similar to previous ECG  Rhythm: sinus rhythm  Rate: normal  Conduction: incomplete left bundle branch block  ST Segments: ST segments  normal  T Waves: T waves normal  QRS axis: normal            CABG x3 2/2019    Carotid doppler 2/2019:  · Proximal right internal carotid artery plaque without significant stenosis.  · Proximal left internal carotid artery plaque without significant stenosis.    [facility] 1/31/2019:  Left main: Normal  Left anterior descending: Normal proximally 100% occluded at the third septal  left to left collaterals large first diagonal 20% origin disease  Ramus intermedius:Not present  Circumflex: Diffuse 50% proximal and mid circumflex disease this is a large vessel  RCA: Is a dominant vessel.  90% ostial lesion 90% proximal lesion 30% mid 90% distal lesion diffuse 30-40% disease in the proximal PDA     SUMMARY: Normal LV function severe three-vessel disease diabetes     RECOMMENDATIONS: Will discuss with Dr. Ramírez as well as the patient about treatment options of medical therapy versus surgery       Assessment:          Diagnosis Plan   1. Chronic coronary artery disease  ECG 12 Lead          Plan:       Fall with concussion- echo and carotid doppler studies at [facility] without significant findings. I reviewed with patient today. He has not had pre-syncope or syncope since hospitalization. He thinks he has had progressive balance issues and will discuss with primary care    CAD with prior CABG 2019- medical therapy aspirin, Plavix, statin.  Negative stress MPI 7/2022. Denies anginal symptoms    Hyperlipidemia- continue atorvastatin 40 mg.  Lipid panel 4/2022 demonstrates good control    Hypertension- BP is controlled. I am not making any medication changes today    Postop A. fib- no noted recurrent episodes.  Denies palpitations    Peripheral vascular disease- denies claudication    Patient is seen today for follow-up after hospitalization for syncope to review his cardiac testing done at [facility].  He had carotid dopplers demonstrating < 50% stenosis bilaterally and echo with normal LVEF and only mild AS.  He  denies pre-syncope or syncope.  He is doing well from a cardiac perspective with no anginal symptoms.  I am not making any changes today. He will follow-upw with Dr. Prieto in January as scheduled, earlier with problems.    Orders Placed This Encounter   Procedures   • ECG 12 Lead     This order was created via procedure documentation     Order Specific Question:   Release to patient     Answer:   Routine Release        ANETA Jimenez  [facility]  10/06/22  12:24 EDT

## 2023-01-10 ENCOUNTER — OFFICE VISIT (OUTPATIENT)
Dept: CARDIOLOGY | Facility: CLINIC | Age: 73
End: 2023-01-10
Payer: MEDICARE

## 2023-01-10 VITALS
HEART RATE: 66 BPM | SYSTOLIC BLOOD PRESSURE: 138 MMHG | BODY MASS INDEX: 30.64 KG/M2 | WEIGHT: 214 LBS | DIASTOLIC BLOOD PRESSURE: 84 MMHG | HEIGHT: 70 IN

## 2023-01-10 DIAGNOSIS — I73.9 PERIPHERAL VASCULAR DISEASE: ICD-10-CM

## 2023-01-10 DIAGNOSIS — E78.2 MIXED HYPERLIPIDEMIA: ICD-10-CM

## 2023-01-10 DIAGNOSIS — I44.4 LAFB (LEFT ANTERIOR FASCICULAR BLOCK): ICD-10-CM

## 2023-01-10 DIAGNOSIS — I25.10 CHRONIC CORONARY ARTERY DISEASE: Primary | ICD-10-CM

## 2023-01-10 DIAGNOSIS — I48.0 PAF (PAROXYSMAL ATRIAL FIBRILLATION): ICD-10-CM

## 2023-01-10 DIAGNOSIS — I10 PRIMARY HYPERTENSION: ICD-10-CM

## 2023-01-10 PROCEDURE — 93000 ELECTROCARDIOGRAM COMPLETE: CPT | Performed by: INTERNAL MEDICINE

## 2023-01-10 PROCEDURE — 99214 OFFICE O/P EST MOD 30 MIN: CPT | Performed by: INTERNAL MEDICINE

## 2023-01-10 RX ORDER — LOSARTAN POTASSIUM 25 MG/1
25 TABLET ORAL EVERY EVENING
Qty: 90 TABLET | Refills: 3 | Status: SHIPPED | OUTPATIENT
Start: 2023-01-10

## 2023-01-10 RX ORDER — SILDENAFIL CITRATE 20 MG/1
20 TABLET ORAL AS NEEDED
COMMUNITY

## 2023-01-10 RX ORDER — INSULIN ASPART 100 [IU]/ML
INJECTION, SOLUTION INTRAVENOUS; SUBCUTANEOUS
COMMUNITY

## 2023-01-10 NOTE — PROGRESS NOTES
Date of Office Visit: 01/10/2023  Encounter Provider: Argentina Prieto MD  Place of Service: [facility] CARDIOLOGY  Patient Name: Spencer Sinha  :1950      Patient ID:  Spencer Sinha is a 72 y.o. male is here for  followup for CAD.         History of Present Illness    He was admitted to [facility] on 2011 with syncope and  atrial fibrillation with rapid ventricular response. His atrial fibrillation  was due to thyrotoxicosis. He sees Dr. Brown for this. He converted  to sinus rhythm with medication, was stable and able to be discharged from  the hospital on propylthiouracil.      He had bilateral knee replacements in  with Dr. Mena. Prior to that  he had an abnormal stress Myoview study showing anterior infarct with  periinfarct ischemia. He went on to have a cardiac catheterization showing  severe left anterior descending artery disease, which was well  collateralized and he was treated medically as he did not have any active  angina.      He has a known history of peripheral arterial disease and had aortobifemoral  bypass done by Dr. Solo in . He follows with Dr. Olivera.       He has discoid lupus and follows with Dr. Foster.   He stopped smoking in .      He had a CT scan 2018 which showed extensive coronary artery calcification and he was experiencing mild exertional dyspnea.  A nuclear stress test was obtained which showed a medium sized area of moderate to severe ischemia in the inferior apical distribution.  He underwent cardiac catheterization 19 which showed the following: Normal left main, occluded LAD with left to left collaterals, 50% proximal and mid circumflex, 90% ostial RCA, 90% proximal and distal RCA.  On 19 he underwent coronary artery bypass graft surgery x3 with LIMA to LAD, SVG to OM, and SVG to PDA by Dr. Crawford.  He had a carotid duplex 19 which showed right and left internal carotid  artery plaque bilaterally with no significant stenosis.  He had a brief episode of postoperative atrial fibrillation and converted to normal sinus rhythm.  He was placed on a short course of amiodarone.     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 Silva May 2019.  He also had lymph node dissection.  He is being watched by Dr. Renee with serial scans.       Labs on 12/30/2022 show potassium 4.5, ALT 60, otherwise normal CMP, triglycerides 155, total cholesterol 43, HDL 46, LDL 66, VLDL 31, hemoglobin A1c 8.3%, TSH elevated 4.79, free T4 normal at 1.29.  He had a CBC that was unremarkable 9/26/2022.  He had a nonischemic stress nuclear perfusion study done 7/13/2022.    He fell off of a stepladder at his home September 21, 2022 and sustained a severe concussion and was followed by neurology at [facility] until November 2022.  He was hospitalized.  During hospitalization, he had an echocardiogram which showed ejection fraction 60-65% with grade 1 diastolic dysfunction and mild aortic stenosis.  He will no longer get on ladders.  This is the third time he is fallen off a ladder.  He was placed on insulin glargine but had weight gain and swelling associated with it so this is been discontinued by Dr. Browne with endocrinology at [facility].  He is seeing Dr. Gisela Hutchison for peripheral chill disease and last saw her September 2022, things were stable.    He has no chest pain or pressure.  He has no orthopnea or PND.  He has mild lower extremity edema.  He does not feels heart racing or skipping.  He has no dizziness, syncope or falls.  His balance is off.  He is taking his medications as directed without difficulty.  He has no headaches or visual changes.    Past Medical History:   Diagnosis Date   • Atrial fibrillation (HCC)    • CAD (coronary artery disease)     S/p CABG on 02/8/19   • Chronic back pain     Hx Surgery   •  Chronic knee pain     S/p Reg Total Knee Replacement   • Depression 07/2009    Trazadone   • Diplopia    • Discoid lupus erythematosus    • Generalized arthritis 1997   • Graves disease 12/2008    Hyperactive thyroid radiation   • Hyperlipidemia     Controlled w/Meds   • Hypertension 07/2008   • Hypogonadism, male    • Hypothyroidism, postablative    • Lactose intolerance 06/2017   • LAFB (left anterior fascicular block) 1/6/2022   • Left ventricular hypertrophy    • Long-term insulin use ([facility])    • Lung mass     R Lower Lung   • Metabolic disorder    • Near syncope 8/20/2020   • NSTEMI (non-ST elevated myocardial infarction) ([facility])    • Orthostatic hypotension 8/20/2020   • PAD (peripheral artery disease) ([facility]) 12/2007   • Right bundle branch block with left anterior fascicular block    • Syncope Hx   • Syncope and collapse 8/20/2020   • Thyrotoxicosis factitia    • Type 2 diabetes mellitus ([facility]) 1982         Past Surgical History:   Procedure Laterality Date   • ABDOMINAL AORTA STENT Bilateral     Using Vein-Aortofemoral   • BACK SURGERY     • CARDIAC CATHETERIZATION N/A 1/31/2019    Procedure: Coronary angiography;  Surgeon: Hardik Silverman MD;  Location: [facility] CATH INVASIVE LOCATION;  Service: Cardiovascular   • CARDIAC CATHETERIZATION N/A 1/31/2019    Procedure: Left Heart Cath;  Surgeon: Hardik Silverman MD;  Location: [facility] CATH INVASIVE LOCATION;  Service: Cardiovascular   • CARDIAC CATHETERIZATION N/A 1/31/2019    Procedure: Left ventriculography;  Surgeon: Hardik Silverman MD;  Location: [facility] CATH INVASIVE LOCATION;  Service: Cardiovascular   • CATARACT EXTRACTION  2018   • COLONOSCOPY     • CORONARY ARTERY BYPASS GRAFT N/A 2/8/2019    Procedure: THANG STERNOTOMY CORONARY ARTERY BYPASS GRAFT TIMES 3 USING LEFT INTERNAL MAMMARY ARTERY AND LEFT GREATER SAPHENOUS VEIN GRAFT PER ENDOSCOPIC VEIN GRAFT AND PRP.;  Surgeon: Nathaniel Crawford MD;  Location: [facility] MAIN OR;  Service: Cardiothoracic   • CORONARY  STENT PLACEMENT  12/01/2007    PAD   • LUMBAR LAMINECTOMY  10/2015    And fusion L4/L5   • LUNG BIOPSY      Right lung mass   • OTHER SURGICAL HISTORY      Catheter Ablation   • REPLACEMENT TOTAL KNEE BILATERAL      10/2010 and 12/2010   • TONSILLECTOMY AND ADENOIDECTOMY         Current Outpatient Medications on File Prior to Visit   Medication Sig Dispense Refill   • albuterol sulfate  (90 Base) MCG/ACT inhaler      • aspirin 81 MG EC tablet Take 81 mg by mouth Daily. HOLDING PER MD     • atorvastatin (LIPITOR) 40 MG tablet TAKE ONE TABLET BY MOUTH DAILY 90 tablet 0   • Baclofen 5 MG tablet Take 5 mg by mouth.     • Cholecalciferol (VITAMIN D3) 2000 units tablet Take 1 tablet by mouth Daily.     • clopidogrel (PLAVIX) 75 MG tablet Take 75 mg by mouth Daily. HOLDING PER MD     • Cyanocobalamin (VITAMIN B-12 PO) Take 1 tablet by mouth Daily.     • DULoxetine (CYMBALTA) 60 MG capsule Take 60 mg by mouth Every Evening.     • Ferrous Sulfate (IRON PO) Take 1 tablet by mouth Daily.     • fluticasone (FLONASE) 50 MCG/ACT nasal spray      • gabapentin (NEURONTIN) 300 MG capsule      • glimepiride (AMARYL) 4 MG tablet      • glucose blood test strip Kimberly Contour Test In Vitro Strip; Patient Sig: Kimberly Contour Test In Vitro Strip ; 100; 0; 06-May-2012; Active     • Insulin Aspart (novoLOG) 100 UNIT/ML injection Inject  under the skin into the appropriate area as directed 3 (Three) Times a Day Before Meals.     • Insulin Pen Needle 32G X 6 MM misc      • levothyroxine (SYNTHROID, LEVOTHROID) 150 MCG tablet Take 1 tablet by mouth Daily. (Patient taking differently: Take 175 mcg by mouth Daily.) 90 tablet 2   • metFORMIN (GLUCOPHAGE) 1000 MG tablet TAKE ONE TABLET BY MOUTH TWO TIMES A  tablet 0   • rOPINIRole (REQUIP) 1 MG tablet Take 2 mg by mouth Daily.     • rosuvastatin (CRESTOR) 40 MG tablet      • sildenafil (REVATIO) 20 MG tablet Take 20 mg by mouth As Needed.     • Trulicity 1.5 MG/0.5ML solution  pen-injector      • vitamin C (ASCORBIC ACID) 250 MG tablet Take 1,000 mg by mouth Daily.     • Zinc 30 MG capsule      • [DISCONTINUED] Insulin Glargine (BASAGLAR KWIKPEN) 100 UNIT/ML injection pen        No current facility-administered medications on file prior to visit.       Social History     Socioeconomic History   • Marital status:    Tobacco Use   • Smoking status: Former     Packs/day: 1.00     Years: 45.00     Pack years: 45.00     Types: Cigarettes     Start date: 1970     Quit date:      Years since quittin.0   • Smokeless tobacco: Never   Substance and Sexual Activity   • Alcohol use: No     Comment: Caffeine use: 3-4 cups daily   • Drug use: No   • Sexual activity: Yes     Partners: Female     Birth control/protection: None           ROS    Procedures    ECG 12 Lead    Date/Time: 1/10/2023 9:27 AM  Performed by: Argentina Prieto MD  Authorized by: Argentina Prieto MD   Comparison: compared with previous ECG   Similar to previous ECG  Rhythm: sinus rhythm  Conduction: left anterior fascicular block  Other findings: left ventricular hypertrophy    Clinical impression: abnormal EKG                Objective:      Vitals:    01/10/23 0908   BP: 138/84   Pulse: 66   Weight: 97.1 kg (214 lb)   Height: 177.8 cm (70\")     Body mass index is 30.71 kg/m².    Vitals reviewed.   Constitutional:       General: Not in acute distress.     Appearance: Well-developed. Not diaphoretic.   Eyes:      General: No scleral icterus.     Conjunctiva/sclera: Conjunctivae normal.   HENT:      Head: Normocephalic and atraumatic.   Neck:      Thyroid: No thyromegaly.      Vascular: No carotid bruit or JVD.      Lymphadenopathy: No cervical adenopathy.   Pulmonary:      Effort: Pulmonary effort is normal. No respiratory distress.      Breath sounds: Normal breath sounds. No wheezing. No rhonchi. No rales.   Chest:      Chest wall: Not tender to palpatation.   Cardiovascular:      Normal rate. Regular  rhythm.      Murmurs: There is no murmur.      No gallop.   Pulses:     Intact distal pulses.   Edema:     Peripheral edema absent.   Abdominal:      General: Bowel sounds are normal. There is no distension or abdominal bruit.      Palpations: Abdomen is soft. There is no abdominal mass.      Tenderness: There is no abdominal tenderness.   Musculoskeletal:         General: No deformity.      Extremities: No clubbing present.     Cervical back: Neck supple. Skin:     General: Skin is warm and dry. There is no cyanosis.      Coloration: Skin is not pale.      Findings: No rash.   Neurological:      Mental Status: Alert and oriented to person, place, and time.      Cranial Nerves: No cranial nerve deficit.   Psychiatric:         Judgment: Judgment normal.         Lab Review:       Assessment:      Diagnosis Plan   1. Chronic coronary artery disease        2. Primary hypertension        3. Mixed hyperlipidemia        4. LAFB (left anterior fascicular block)        5. PAF (paroxysmal atrial fibrillation) ([facility])        6. Peripheral vascular disease ([facility])          1. History of atrial fibrillation due to thyroid toxicosis. No recurrence of atrial fibrillation.  2. Hypertension, goal <120/80.  Blood pressure slightly high.  Start losartan 25 mg nightly.  3. Hyperlipidemia, on atorvastatin.  4. Diabetes mellitus type 2, per Dr. Browne. Well controlled.  He had swelling and weight gain with insulin glargine and this had to be discontinued.  5. Peripheral arterial disease status post bilateral aorta with bifemoral  bypasses. Follows with Dr. Gisela Hutchison annually and everything has been stable.  Carotid duplex study done 9/2022 showed less than 50% bilateral carotid artery stenosis  6. Tobacco use. He stopped smoking in 2015.   7. Coronary disease with CABG 2/2019. No angina.  Nonischemic stress nuclear perfusion study done 7/2022.  8. Abnormal ECG with left anterior fascicular block.  9. Osteoarthritis.   10. Stress with  depression, under better control.   11. Right lung cancer with lobectomy 5/2019, doing well, follows with Dr. Renee.   12.  Fall off ladder September 2022 with resultant concussion, was followed by neurology until November 2022.     Plan:       See Sydney in 6 months at [facility].  Start losartan 25 mg in the evening.  Check a BMP in about 1 month.  He does have labs coming up with Dr. Renee his oncologist.

## 2023-02-07 ENCOUNTER — TELEPHONE (OUTPATIENT)
Dept: CARDIOLOGY | Facility: CLINIC | Age: 73
End: 2023-02-07
Payer: MEDICARE

## 2023-02-07 NOTE — TELEPHONE ENCOUNTER
Received labs - stable.     ----- Message from Nanette Baugh MA sent at 2/7/2023  8:37 AM EST -----  Regarding: FW: Comprehensive Metabolic Panel 02/06/2023  Contact: [phone]    ----- Message -----  From: Spencer Sinha  Sent: 2/6/2023   5:48 PM EST  To: Paolo Hi [facility]  Subject: Comprehensive Metabolic Panel 02/06/2023           Log out    (Image)  (Image: [email or web address] by [employer])  (Image: Printer friendly page--New window will open)  (Image)About this test  If there is incorrect information on this page, you may submit a chart correction request.  If you are missing information or you have questions about your test results, please contact your provider.  Details  Past Results  Graph of Past Results  Component Results  Component Your Value Standard Range Flag  Sodium 139 mmol/L  136 - 145 mmol/L     Potassium 3.9 mmol/L  3.5 - 5.1 mmol/L     Chloride 104 mmol/L  98 - 107 mmol/L     Carbon Dioxide 26 mmol/L  22 - 29 mmol/L     Anion Gap 9 (arb'U)  5 - 13 (arb'U)     Glucose 118 mg/dL  71 - 139 mg/dL     Blood Urea Nitrogen (BUN) 17 mg/dL  8 - 26 mg/dL     Creatinine-Blood 1.15 mg/dL  0.73 - 1.18 mg/dL     BUN/Creatinine Ratio 14.8 RATIO  RATIO     Estimated GFR >60 /1.73 m2  >60 /1.73 m2     Estimated GFR if -American >60 /1.73 m2  >60 /1.73 m2      7.5 g/dL      Globulin 3.2 g/dL  1.5 - 4.5 g/dL     Albumin/Globulin Ratio 1.3 RATIO  1.1 - 2.5 RATIO     Calcium 9.5 mg/dL  8.4 - 10.2 mg/dL     Total Bilirubin 0.5 mg/dL  0.2 - 1.2 mg/dL     AST/SGOT 19 U/L  5 - 34 U/L     ALT/SGPT 23 U/L  0 - 55 U/L     Alkaline Phosphatase 71 U/L  40 - 150 U/L   General Information  Ordered by Robert Renee  Collected on 02/06/2023 1:42 PM from Blood (Plasma)  Resulted on 02/06/2023 2:29 PM  Result Status: Final result

## 2023-09-26 ENCOUNTER — TELEPHONE (OUTPATIENT)
Dept: CARDIOLOGY | Facility: CLINIC | Age: 73
End: 2023-09-26
Payer: MEDICARE

## 2023-09-26 NOTE — TELEPHONE ENCOUNTER
Will with [facility] called and stated that the patient needs to have a [facility] orthoplasty on 10/04/23 with Dr. Kaminski.  They are wanting directions on how to hold Plavix and ASA.  They are also needing cardiac clearance.    CB: [phone] ext [phone]    Thanks,  Ovi

## 2023-10-17 ENCOUNTER — OFFICE (AMBULATORY)
Dept: URBAN - METROPOLITAN AREA CLINIC 76 | Facility: CLINIC | Age: 73
End: 2023-10-17

## 2023-10-17 VITALS
WEIGHT: 219 LBS | SYSTOLIC BLOOD PRESSURE: 140 MMHG | DIASTOLIC BLOOD PRESSURE: 77 MMHG | HEART RATE: 70 BPM | OXYGEN SATURATION: 96 % | HEIGHT: 71 IN

## 2023-10-17 DIAGNOSIS — K59.1 FUNCTIONAL DIARRHEA: ICD-10-CM

## 2023-10-17 DIAGNOSIS — K64.8 OTHER HEMORRHOIDS: ICD-10-CM

## 2023-10-17 DIAGNOSIS — K30 FUNCTIONAL DYSPEPSIA: ICD-10-CM

## 2023-10-17 PROCEDURE — 99203 OFFICE O/P NEW LOW 30 MIN: CPT | Performed by: NURSE PRACTITIONER

## 2023-11-28 RX ORDER — LOSARTAN POTASSIUM 25 MG/1
25 TABLET ORAL EVERY EVENING
Qty: 90 TABLET | Refills: 3 | Status: SHIPPED | OUTPATIENT
Start: 2023-11-28

## 2024-01-16 ENCOUNTER — OFFICE VISIT (OUTPATIENT)
Dept: CARDIOLOGY | Facility: CLINIC | Age: 74
End: 2024-01-16
Payer: MEDICARE

## 2024-01-16 VITALS
WEIGHT: 223 LBS | SYSTOLIC BLOOD PRESSURE: 118 MMHG | HEART RATE: 58 BPM | DIASTOLIC BLOOD PRESSURE: 80 MMHG | HEIGHT: 70 IN | BODY MASS INDEX: 31.92 KG/M2

## 2024-01-16 DIAGNOSIS — E78.2 MIXED HYPERLIPIDEMIA: ICD-10-CM

## 2024-01-16 DIAGNOSIS — Z79.4 TYPE 2 DIABETES MELLITUS WITH OTHER CIRCULATORY COMPLICATION, WITH LONG-TERM CURRENT USE OF INSULIN: ICD-10-CM

## 2024-01-16 DIAGNOSIS — I48.0 PAF (PAROXYSMAL ATRIAL FIBRILLATION): ICD-10-CM

## 2024-01-16 DIAGNOSIS — E11.59 TYPE 2 DIABETES MELLITUS WITH OTHER CIRCULATORY COMPLICATION, WITH LONG-TERM CURRENT USE OF INSULIN: ICD-10-CM

## 2024-01-16 DIAGNOSIS — I25.10 CHRONIC CORONARY ARTERY DISEASE: Primary | ICD-10-CM

## 2024-01-16 DIAGNOSIS — I10 PRIMARY HYPERTENSION: ICD-10-CM

## 2024-01-16 DIAGNOSIS — I35.0 NONRHEUMATIC AORTIC VALVE STENOSIS: ICD-10-CM

## 2024-01-16 PROCEDURE — 3074F SYST BP LT 130 MM HG: CPT | Performed by: INTERNAL MEDICINE

## 2024-01-16 PROCEDURE — 1159F MED LIST DOCD IN RCRD: CPT | Performed by: INTERNAL MEDICINE

## 2024-01-16 PROCEDURE — 3079F DIAST BP 80-89 MM HG: CPT | Performed by: INTERNAL MEDICINE

## 2024-01-16 PROCEDURE — 93000 ELECTROCARDIOGRAM COMPLETE: CPT | Performed by: INTERNAL MEDICINE

## 2024-01-16 PROCEDURE — 99214 OFFICE O/P EST MOD 30 MIN: CPT | Performed by: INTERNAL MEDICINE

## 2024-01-16 PROCEDURE — 1160F RVW MEDS BY RX/DR IN RCRD: CPT | Performed by: INTERNAL MEDICINE

## 2024-01-16 RX ORDER — FLUTICASONE FUROATE, UMECLIDINIUM BROMIDE AND VILANTEROL TRIFENATATE 100; 62.5; 25 UG/1; UG/1; UG/1
POWDER RESPIRATORY (INHALATION)
COMMUNITY
Start: 2023-11-15

## 2024-01-16 RX ORDER — INSULIN ASPART INJECTION 100 [IU]/ML
INJECTION, SOLUTION SUBCUTANEOUS
COMMUNITY
Start: 2023-11-06

## 2024-01-16 NOTE — PROGRESS NOTES
Date of Office Visit: 01/10/2023  Encounter Provider: Argentina Prieto MD  Place of Service: [facility] CARDIOLOGY  Patient Name: Spencer Sinha  :1950      Patient ID:  Spencer Sinha is a 73 y.o. male is here for  followup for CAD.         History of Present Illness    He has a history of CAD- s/p CABG in 2019, history of lung cancer - s/p RLL lobectomy in 2019, hypertension, hyperlipidemia, diabetes mellitus, PAF, PAD.    He was admitted to [facility] on 2011 with syncope and  atrial fibrillation with rapid ventricular response. His atrial fibrillation  was due to thyrotoxicosis. He sees Dr. Brown for this. He converted  to sinus rhythm with medication, was stable and able to be discharged from  the hospital on propylthiouracil.      He had bilateral knee replacements in  with Dr. Mena. Prior to that  he had an abnormal stress Myoview study showing anterior infarct with  periinfarct ischemia. He went on to have a cardiac catheterization showing  severe left anterior descending artery disease, which was well  collateralized and he was treated medically as he did not have any active  angina.      He has a known history of peripheral arterial disease and had aortobifemoral  bypass done by Dr. Solo in . He follows with Dr. Gisela Sampson. He has discoid lupus and follows with Dr. Foster.   He stopped smoking in .      He had a CT scan 2018 which showed extensive coronary artery calcification and he was experiencing mild exertional dyspnea.  A nuclear stress test was obtained which showed a medium sized area of moderate to severe ischemia in the inferior apical distribution.  He underwent cardiac catheterization 19 which showed the following: Normal left main, occluded LAD with left to left collaterals, 50% proximal and mid circumflex, 90% ostial RCA, 90% proximal and distal RCA.  On 19 he underwent coronary artery  bypass graft surgery x3 with LIMA to LAD, SVG to OM, and SVG to PDA by Dr. Crawford.  He had a carotid duplex 2/7/19 which showed right and left internal carotid artery plaque bilaterally with no significant stenosis.  He had a brief episode of postoperative atrial fibrillation and converted to normal sinus rhythm.  He was placed on a short course of amiodarone.     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 Silva May 2019.  He also had lymph node dissection.  He is being watched by Dr. Renee with serial scans.       He had a nonischemic stress nuclear perfusion study done 7/13/2022.    He fell off of a stepladder at his home September 21, 2022 and sustained a severe concussion and was followed by neurology at [facility] until November 2022.  He was hospitalized.  During hospitalization, he had an echocardiogram which showed ejection fraction 60-65% with grade 1 diastolic dysfunction and mild aortic stenosis.  He will no longer get on ladders.  This is the third time he is fallen off a ladder.  He was placed on insulin glargine but had weight gain and swelling associated with it so this is been discontinued by Dr. Browne with endocrinology at [facility].      Ankle-brachial indices done 8/24/2023 showed normal right lower extremity, normal left lower extremity.  Carotid duplex study done 8/24/2023 showed less than 50% bilateral internal carotid artery stenosis, normal flow in the bilateral vertebral arteries.    He has some irritation of his left eye and its red.  He has had no dizziness or syncope.  He has no orthopnea or PND.  He does not feels heart racing or skipping.  His energy level is good and he is breathing well.  He has not really had labs done recently.  He is taking his medications as directed without difficulty.  He has no exertional dyspnea and no exertional chest tightness or pressure.  He has gained weight and is  not exercising a lot.    Past Medical History:   Diagnosis Date    Atrial fibrillation     CAD (coronary artery disease)     S/p CABG on 02/8/19    Cancer May 2019    Chronic back pain     Hx Surgery    Chronic knee pain     S/p Reg Total Knee Replacement    Depression 07/2009    Trazadone    Diplopia     Discoid lupus erythematosus     Generalized arthritis 1997    Graves disease 12/2008    Hyperactive thyroid radiation    Hyperlipidemia     Controlled w/Meds    Hypertension 07/2008    Hypogonadism, male     Hypothyroidism, postablative     Lactose intolerance 06/2017    LAFB (left anterior fascicular block) 01/06/2022    Left ventricular hypertrophy     Long-term insulin use     Lung mass     R Lower Lung    Metabolic disorder     Near syncope 08/20/2020    NSTEMI (non-ST elevated myocardial infarction)     Orthostatic hypotension 08/20/2020    PAD (peripheral artery disease) 12/2007    Right bundle branch block with left anterior fascicular block     Syncope Hx    Syncope and collapse 08/20/2020    Thyrotoxicosis factitia     Type 2 diabetes mellitus 1982         Past Surgical History:   Procedure Laterality Date    ABDOMINAL AORTA STENT Bilateral     Using Vein-Aortofemoral    BACK SURGERY      CARDIAC CATHETERIZATION N/A 1/31/2019    Procedure: Coronary angiography;  Surgeon: Hardik Silverman MD;  Location:  [facility];  Service: Cardiovascular    CARDIAC CATHETERIZATION N/A 1/31/2019    Procedure: Left Heart Cath;  Surgeon: Hardik Silverman MD;  Location:  [facility];  Service: Cardiovascular    CARDIAC CATHETERIZATION N/A 1/31/2019    Procedure: Left ventriculography;  Surgeon: Hardik Silverman MD;  Location:  [facility];  Service: Cardiovascular    CATARACT EXTRACTION  2018    COLONOSCOPY      CORONARY ARTERY BYPASS GRAFT N/A 2/8/2019    Procedure: THANG STERNOTOMY CORONARY ARTERY BYPASS GRAFT TIMES 3 USING LEFT INTERNAL MAMMARY ARTERY AND LEFT GREATER  SAPHENOUS VEIN GRAFT PER ENDOSCOPIC VEIN GRAFT AND PRP.;  Surgeon: Nathaniel Crawford MD;  Location: [facility] OR;  Service: Cardiothoracic    CORONARY STENT PLACEMENT  12/01/2007    PAD    LUMBAR LAMINECTOMY  10/2015    And fusion L4/L5    LUNG BIOPSY      Right lung mass    OTHER SURGICAL HISTORY      Catheter Ablation    REPLACEMENT TOTAL KNEE BILATERAL      10/2010 and 12/2010    TONSILLECTOMY AND ADENOIDECTOMY         Current Outpatient Medications on File Prior to Visit   Medication Sig Dispense Refill    albuterol sulfate  (90 Base) MCG/ACT inhaler       aspirin 81 MG EC tablet Take 1 tablet by mouth Daily. HOLDING PER MD      atorvastatin (LIPITOR) 40 MG tablet TAKE ONE TABLET BY MOUTH DAILY 90 tablet 0    Baclofen 5 MG tablet Take 1 tablet by mouth.      Cholecalciferol (VITAMIN D3) 2000 units tablet Take 1 tablet by mouth Daily.      clopidogrel (PLAVIX) 75 MG tablet Take 1 tablet by mouth Daily. HOLDING PER MD      Cyanocobalamin (VITAMIN B-12 PO) Take 1 tablet by mouth Daily.      DULoxetine (CYMBALTA) 60 MG capsule Take 1 capsule by mouth Every Evening.      Ferrous Sulfate (IRON PO) Take 1 tablet by mouth Daily.      Fiasp FlexTouch 100 UNIT/ML solution pen-injector       fluticasone (FLONASE) 50 MCG/ACT nasal spray       gabapentin (NEURONTIN) 600 MG tablet Take 1 tablet by mouth 3 (Three) Times a Day.      glimepiride (AMARYL) 4 MG tablet       glucose blood test strip Kimberly Contour Test In Vitro Strip; Patient Sig: Kimberly Contour Test In Vitro Strip ; 100; 0; 06-May-2012; Active      Insulin Glargine (BASAGLAR KWIKPEN) 100 UNIT/ML injection pen Inject 20 Units under the skin into the appropriate area as directed Daily.      Insulin Pen Needle 32G X 6 MM misc       levothyroxine (SYNTHROID, LEVOTHROID) 175 MCG tablet Take 1 tablet by mouth Daily. 30 tablet 0    losartan (COZAAR) 25 MG tablet TAKE ONE TABLET BY MOUTH EVERY EVENING 90 tablet 3    metFORMIN (GLUCOPHAGE) 1000 MG tablet TAKE ONE  TABLET BY MOUTH TWO TIMES A  tablet 0    rOPINIRole (REQUIP) 2 MG tablet Take 1 tablet by mouth 3 (Three) Times a Day.      rosuvastatin (CRESTOR) 40 MG tablet       Trelegy Ellipta 100-62.5-25 MCG/ACT inhaler       vitamin C (ASCORBIC ACID) 250 MG tablet Take 4 tablets by mouth Daily.      Zinc 30 MG capsule       Dulaglutide (Trulicity) 3 MG/0.5ML solution pen-injector Inject 3 mg under the skin into the appropriate area as directed.      Fluticasone-Salmeterol (ADVAIR/WIXELA) 250-50 MCG/ACT DISKUS Inhale 2 puffs 2 (Two) Times a Day. (Patient not taking: Reported on 1/16/2024)      Insulin Aspart (novoLOG) 100 UNIT/ML injection Inject  under the skin into the appropriate area as directed 3 (Three) Times a Day Before Meals.      sildenafil (REVATIO) 20 MG tablet Take 1 tablet by mouth As Needed.       No current facility-administered medications on file prior to visit.       Social History     Socioeconomic History    Marital status:    Tobacco Use    Smoking status: Former     Packs/day: 1.00     Years: 45.00     Additional pack years: 0.00     Total pack years: 45.00     Types: Cigarettes     Start date: 1/1/1970     Quit date: 1/1/2014     Years since quitting: 10.0     Passive exposure: Past    Smokeless tobacco: Never   Vaping Use    Vaping Use: Never used   Substance and Sexual Activity    Alcohol use: No     Comment: Caffeine use: 3-4 cups daily    Drug use: Never    Sexual activity: Not Currently     Partners: Female     Birth control/protection: None           ROS    Procedures    ECG 12 Lead    Date/Time: 1/16/2024 10:27 AM  Performed by: Argentina Prieto MD    Authorized by: Argentina Prieto MD  Comparison: compared with previous ECG   Similar to previous ECG  Rhythm: sinus rhythm  Q waves: V3 and V4    T inversion: V5, V6 and II  T flattening: all  QRS axis: left    Clinical impression: abnormal EKG              Objective:      Vitals:    01/16/24 1017   BP: 118/80   Pulse: 58  "  Weight: 101 kg (223 lb)   Height: 177.8 cm (70\")     Body mass index is 32 kg/m².    Vitals reviewed.   Constitutional:       General: Not in acute distress.     Appearance: Well-developed. Not diaphoretic.   Eyes:      General: No scleral icterus.     Conjunctiva/sclera: Conjunctivae normal.   HENT:      Head: Normocephalic and atraumatic.   Neck:      Thyroid: No thyromegaly.      Vascular: No carotid bruit or JVD.      Lymphadenopathy: No cervical adenopathy.   Pulmonary:      Effort: Pulmonary effort is normal. No respiratory distress.      Breath sounds: Normal breath sounds. No wheezing. No rhonchi. No rales.   Chest:      Chest wall: Not tender to palpatation.   Cardiovascular:      Normal rate. Regular rhythm.      Murmurs: There is a grade 3/6 harsh midsystolic murmur at the URSB, radiating to the neck.      No gallop.    Pulses:     Intact distal pulses.   Edema:     Peripheral edema absent.   Abdominal:      General: Bowel sounds are normal. There is no distension or abdominal bruit.      Palpations: Abdomen is soft. There is no abdominal mass.      Tenderness: There is no abdominal tenderness.   Musculoskeletal:         General: No deformity.      Extremities: No clubbing present.     Cervical back: Neck supple. Skin:     General: Skin is warm and dry. There is no cyanosis.      Coloration: Skin is not pale.      Findings: No rash.   Neurological:      Mental Status: Alert and oriented to person, place, and time.      Cranial Nerves: No cranial nerve deficit.   Psychiatric:         Judgment: Judgment normal.         Lab Review:       Assessment:      Diagnosis Plan   1. Chronic coronary artery disease  CBC & Differential    Comprehensive Metabolic Panel    Hemoglobin A1c    Lipid Panel    Magnesium    Uric Acid    Thyroid Panel With TSH      2. PAF (paroxysmal atrial fibrillation)  CBC & Differential    Comprehensive Metabolic Panel    Hemoglobin A1c    Lipid Panel    Magnesium    Uric Acid    Thyroid " Panel With TSH      3. Primary hypertension  CBC & Differential    Comprehensive Metabolic Panel    Hemoglobin A1c    Lipid Panel    Magnesium    Uric Acid    Thyroid Panel With TSH      4. Mixed hyperlipidemia  CBC & Differential    Comprehensive Metabolic Panel    Hemoglobin A1c    Lipid Panel    Magnesium    Uric Acid    Thyroid Panel With TSH      5. Nonrheumatic aortic valve stenosis  Adult Transthoracic Echo Complete W/ Cont if Necessary Per Protocol      6. Type 2 diabetes mellitus with other circulatory complication, with long-term current use of insulin  CBC & Differential    Comprehensive Metabolic Panel    Hemoglobin A1c    Lipid Panel    Magnesium    Uric Acid    Thyroid Panel With TSH            CAD, s/p CABG 2/2019, nonischemic stress nuclear perfusion study 7/2022.    History of atrial fibrillation due to thyroid toxicosis. No recurrence of atrial fibrillation.  Hypertension, goal <120/80.  He is at goal on losartan 25 mg daily.  Hyperlipidemia, on atorvastatin.  Diabetes mellitus type 2, per Dr. Browne. Well controlled.  He had swelling and weight gain with insulin glargine and this had to be discontinued.  Peripheral arterial disease status post bilateral aorta with bifemoral bypasses. Follows with Dr. Gisela Sampson annually and everything has been stable.  Carotid duplex study done 8/2023 showed less than 50% bilateral carotid artery stenosis  History of tobacco use. He stopped smoking in 2015.   Right lung cancer with right lower lobe lobectomy done 5/2019, follows with Dr. Renee.   History of thyrotoxicosis, status post ablation  Aortic stenosis, mild, repeat echocardiogram in 1 year.       Plan:       See Sydney in 1 year with an echocardiogram the same day, get laboratory done-ordered.  No medication changes.        STOP-Bang Score  Have you been diagnosed with Sleep Apnea?: no  Snoring?: yes  Tired?: no  Observed?: no  Pressure?: yes  Stop Score: 2  Body Mass Index more than 35 kg/m2?:  "no  Age older than 50 year old?: yes  Neck large? \">17\"/43cm-M, >16\"/41cm-F: no  Gender=Male?: yes  Total Stop-Bang Score: 4      "

## 2024-06-20 ENCOUNTER — TELEPHONE (OUTPATIENT)
Dept: CARDIOLOGY | Facility: CLINIC | Age: 74
End: 2024-06-20
Payer: MEDICARE

## 2024-06-20 DIAGNOSIS — I35.0 NONRHEUMATIC AORTIC VALVE STENOSIS: Primary | ICD-10-CM

## 2024-06-20 NOTE — TELEPHONE ENCOUNTER
Reviewed recommendations with patient, verbalized understanding, will call with any further questions or complaints.  Pt states that he only had echo at [facility] because he was being worked up for another issue, and had multiple tests done at that time.    Dr. Prieto- pt states that the only symptom he has, is increased DIETZ over the past year, particularly when climbing steps/carrying bags.    Leonidas Garland's schedule is not built out yet.  He is to see APRN and have echo on the same day in October per Dr. Prieto's request.  Can you help facilitate this?  Thanks so much!    Leslie Enrique, RN  Triage Nurse  06/20/24 09:06 EDT

## 2024-06-20 NOTE — TELEPHONE ENCOUNTER
"I looked at the echo from March.  Will have him repeat an echocardiogram in October with a visit to the nurse practitioner that same day.  Really he does not need anything sooner if he is not having symptoms.  Please set up appointment, echocardiogram ordered and arrange for this to.    Also please ask the patient not to get echoes at [facility] because I cannot see those images-any echoes that he has or any cardiac imaging needs to be done at [facility] so that I can see them.    Cancel January echo.    Please call patient    ----- Message from Derrek MENA sent at 6/20/2024  8:32 AM EDT -----  Regarding: FW: TRANSTHORACIC ECHO 2D MMODE SPECTRAL COLOR, COMPLETE  Contact: [phone]  Please see attachment  ----- Message -----  From: Spencer Sinha  Sent: 6/20/2024   8:05 AM EDT  To: [facility]  Subject: TRANSTHORACIC ECHO 2D MMODE SPECTRAL COLOR, #    My PCP suggested that I share this test result from March, and to make you aware of her comment that my murmur is potentially worse. In the attached report are references to the words \"severe calcifications\".  I am very concerned.  I am noticing I am more frequently having shortness of breath episodes.  My next appointment is Jan. 16th.  I suggest that I be seen sooner & perhaps be retested.  "

## 2024-07-09 ENCOUNTER — TELEPHONE (OUTPATIENT)
Dept: CARDIOLOGY | Facility: CLINIC | Age: 74
End: 2024-07-09
Payer: MEDICARE

## 2024-07-09 ENCOUNTER — HOSPITAL ENCOUNTER (OUTPATIENT)
Dept: CARDIOLOGY | Facility: HOSPITAL | Age: 74
Discharge: HOME | End: 2024-07-09
Admitting: INTERNAL MEDICINE
Payer: MEDICARE

## 2024-07-09 VITALS
DIASTOLIC BLOOD PRESSURE: 72 MMHG | HEART RATE: 49 BPM | BODY MASS INDEX: 31.92 KG/M2 | HEIGHT: 70 IN | WEIGHT: 223 LBS | SYSTOLIC BLOOD PRESSURE: 132 MMHG

## 2024-07-09 DIAGNOSIS — I35.0 NONRHEUMATIC AORTIC VALVE STENOSIS: ICD-10-CM

## 2024-07-09 LAB
AORTIC DIMENSIONLESS INDEX: 0.3 (DI)
BH CV ECHO MEAS - ACS: 1.01 CM
BH CV ECHO MEAS - AO MAX PG: 33.4 MMHG
BH CV ECHO MEAS - AO MEAN PG: 19.2 MMHG
BH CV ECHO MEAS - AO ROOT DIAM: 3.6 CM
BH CV ECHO MEAS - AO V2 MAX: 288.8 CM/SEC
BH CV ECHO MEAS - AO V2 VTI: 71.6 CM
BH CV ECHO MEAS - AVA(I,D): 0.96 CM2
BH CV ECHO MEAS - EDV(CUBED): 132.6 ML
BH CV ECHO MEAS - EDV(MOD-SP2): 108 ML
BH CV ECHO MEAS - EDV(MOD-SP4): 105 ML
BH CV ECHO MEAS - EF(MOD-BP): 57.8 %
BH CV ECHO MEAS - EF(MOD-SP2): 57.4 %
BH CV ECHO MEAS - EF(MOD-SP4): 57.1 %
BH CV ECHO MEAS - ESV(CUBED): 43.2 ML
BH CV ECHO MEAS - ESV(MOD-SP2): 46 ML
BH CV ECHO MEAS - ESV(MOD-SP4): 45 ML
BH CV ECHO MEAS - FS: 31.2 %
BH CV ECHO MEAS - IVS/LVPW: 1.04 CM
BH CV ECHO MEAS - IVSD: 1.19 CM
BH CV ECHO MEAS - LAT PEAK E' VEL: 8.3 CM/SEC
BH CV ECHO MEAS - LV DIASTOLIC VOL/BSA (35-75): 48 CM2
BH CV ECHO MEAS - LV MASS(C)D: 230.4 GRAMS
BH CV ECHO MEAS - LV MAX PG: 2.9 MMHG
BH CV ECHO MEAS - LV MEAN PG: 1.56 MMHG
BH CV ECHO MEAS - LV SYSTOLIC VOL/BSA (12-30): 20.6 CM2
BH CV ECHO MEAS - LV V1 MAX: 85.7 CM/SEC
BH CV ECHO MEAS - LV V1 VTI: 20.3 CM
BH CV ECHO MEAS - LVIDD: 5.1 CM
BH CV ECHO MEAS - LVIDS: 3.5 CM
BH CV ECHO MEAS - LVOT AREA: 3.4 CM2
BH CV ECHO MEAS - LVOT DIAM: 2.08 CM
BH CV ECHO MEAS - LVPWD: 1.14 CM
BH CV ECHO MEAS - MED PEAK E' VEL: 7.6 CM/SEC
BH CV ECHO MEAS - MV A DUR: 0.11 SEC
BH CV ECHO MEAS - MV A MAX VEL: 55.1 CM/SEC
BH CV ECHO MEAS - MV DEC SLOPE: 244 CM/SEC2
BH CV ECHO MEAS - MV DEC TIME: 0.25 SEC
BH CV ECHO MEAS - MV E MAX VEL: 74.1 CM/SEC
BH CV ECHO MEAS - MV E/A: 1.35
BH CV ECHO MEAS - MV MAX PG: 2.5 MMHG
BH CV ECHO MEAS - MV MEAN PG: 0.95 MMHG
BH CV ECHO MEAS - MV P1/2T: 94.9 MSEC
BH CV ECHO MEAS - MV V2 VTI: 35.1 CM
BH CV ECHO MEAS - MVA(P1/2T): 2.32 CM2
BH CV ECHO MEAS - MVA(VTI): 1.96 CM2
BH CV ECHO MEAS - PA ACC TIME: 0.06 SEC
BH CV ECHO MEAS - PA V2 MAX: 74.2 CM/SEC
BH CV ECHO MEAS - PULM A REVS DUR: 0.1 SEC
BH CV ECHO MEAS - PULM A REVS VEL: 22.4 CM/SEC
BH CV ECHO MEAS - PULM DIAS VEL: 24.9 CM/SEC
BH CV ECHO MEAS - PULM S/D: 1.42
BH CV ECHO MEAS - PULM SYS VEL: 35.2 CM/SEC
BH CV ECHO MEAS - RAP SYSTOLE: 3 MMHG
BH CV ECHO MEAS - RV MAX PG: 1.5 MMHG
BH CV ECHO MEAS - RV V1 MAX: 61.3 CM/SEC
BH CV ECHO MEAS - RV V1 VTI: 14.5 CM
BH CV ECHO MEAS - RVSP: 28 MMHG
BH CV ECHO MEAS - SV(LVOT): 68.9 ML
BH CV ECHO MEAS - SV(MOD-SP2): 62 ML
BH CV ECHO MEAS - SV(MOD-SP4): 60 ML
BH CV ECHO MEAS - SVI(LVOT): 31.5 ML/M2
BH CV ECHO MEAS - SVI(MOD-SP2): 28.4 ML/M2
BH CV ECHO MEAS - SVI(MOD-SP4): 27.4 ML/M2
BH CV ECHO MEAS - TAPSE (>1.6): 1.75 CM
BH CV ECHO MEAS - TR MAX PG: 24.5 MMHG
BH CV ECHO MEAS - TR MAX VEL: 247.3 CM/SEC
BH CV ECHO MEASUREMENTS AVERAGE E/E' RATIO: 9.32
BH CV XLRA - TDI S': 9.5 CM/SEC
LEFT ATRIUM VOLUME INDEX: 25.1 ML/M2
SINUS: 3.3 CM
STJ: 2.9 CM

## 2024-07-09 PROCEDURE — 25510000001 PERFLUTREN (DEFINITY) 8.476 MG IN SODIUM CHLORIDE (PF) 0.9 % 10 ML INJECTION: Performed by: INTERNAL MEDICINE

## 2024-07-09 PROCEDURE — 93306 TTE W/DOPPLER COMPLETE: CPT

## 2024-07-09 RX ADMIN — PERFLUTREN 2 ML: 6.52 INJECTION, SUSPENSION INTRAVENOUS at 10:57

## 2024-07-09 NOTE — TELEPHONE ENCOUNTER
Notified patient of results. Patient verbalized understanding.    Adelina Warren RN  [place] [facility]

## 2024-08-12 ENCOUNTER — DOCUMENTATION (OUTPATIENT)
Dept: CARDIOLOGY | Facility: CLINIC | Age: 74
End: 2024-08-12
Payer: MEDICARE

## 2024-08-12 NOTE — PROGRESS NOTES
CARDIOLOGY    Patient Name: Spencer Sinha  :1950  Age: 74 y.o.    24    To whom it may concern:    Spencer Sinha was referred to my office for cardiovascular risk assessment exam for upcoming cervical laminectomy and fusion.    From a cardiovascular standpoint, the patient is at the following risk of major cardiovascular event in the jeffry-operative setting:  [] Low         [] Modifiable  [] Low-Moderate       [x] Non-Modifiable   [x] Moderate  [] Moderare - high  [] High    Cardiac Testing:  [] Needs further cardiac testing  [x] Does not need further cardiac testing    Anticoagulant Status:  [] No anticoagulants    [x] The patient is on  [x] ASA; hold for _7__ days.  [] Coumadin; hold for ___ days.  [x] Plavix; hold for __7_ days.  [] Eliquis; hold for ___ days.  [] Brilinta; hold for ___ days.  [] Xarelto; hold for ___ days.  [] Effient; hold for ___ days.    [] The patient requires Lovenox bridging, which has been prescribed.     Beta Blockers:  [] The patient will need pre-op beta blockers which will be prescribed by my clinic.    If there are any problems during surgery, please do not hesitate to contact my office.    Thank you for allowing me to participate in this patient's care.    Sincerely,         Argentina Prieto MD  [facility]   [facility]  [address] [place] [address]  Office: [phone]

## 2025-01-07 NOTE — TELEPHONE ENCOUNTER
Failed protocol    NOV-01/16/25-EE  LOV-01/16/24-RM    CAD, s/p CABG 2/2019, nonischemic stress nuclear perfusion study 7/2022.    History of atrial fibrillation due to thyroid toxicosis. No recurrence of atrial fibrillation.  Hypertension, goal <120/80.  He is at goal on losartan 25 mg daily.  Hyperlipidemia, on atorvastatin.  Diabetes mellitus type 2, per Dr. Browne. Well controlled.  He had swelling and weight gain with insulin glargine and this had to be discontinued.  Peripheral arterial disease status post bilateral aorta with bifemoral bypasses. Follows with Dr. Gisela Sampson annually and everything has been stable.  Carotid duplex study done 8/2023 showed less than 50% bilateral carotid artery stenosis  History of tobacco use. He stopped smoking in 2015.   Right lung cancer with right lower lobe lobectomy done 5/2019, follows with Dr. Renee.   History of thyrotoxicosis, status post ablation  Aortic stenosis, mild, repeat echocardiogram in 1 year.        Plan:       See Sydney in 1 year with an echocardiogram the same day, get laboratory done-ordered.  No medication changes.    LABS: 06/19/24

## 2025-01-08 RX ORDER — LOSARTAN POTASSIUM 25 MG/1
25 TABLET ORAL EVERY EVENING
Qty: 90 TABLET | Refills: 3 | Status: SHIPPED | OUTPATIENT
Start: 2025-01-08

## 2025-01-16 ENCOUNTER — TELEPHONE (OUTPATIENT)
Dept: CARDIOLOGY | Facility: CLINIC | Age: 75
End: 2025-01-16
Payer: MEDICARE

## 2025-01-16 ENCOUNTER — HOSPITAL ENCOUNTER (OUTPATIENT)
Dept: CARDIOLOGY | Facility: HOSPITAL | Age: 75
Discharge: HOME | End: 2025-01-16
Admitting: INTERNAL MEDICINE
Payer: MEDICARE

## 2025-01-16 ENCOUNTER — OFFICE VISIT (OUTPATIENT)
Dept: CARDIOLOGY | Facility: CLINIC | Age: 75
End: 2025-01-16
Payer: MEDICARE

## 2025-01-16 VITALS
SYSTOLIC BLOOD PRESSURE: 140 MMHG | OXYGEN SATURATION: 100 % | DIASTOLIC BLOOD PRESSURE: 70 MMHG | BODY MASS INDEX: 30.21 KG/M2 | HEIGHT: 70 IN | WEIGHT: 211 LBS | HEART RATE: 53 BPM

## 2025-01-16 VITALS
DIASTOLIC BLOOD PRESSURE: 68 MMHG | WEIGHT: 223 LBS | BODY MASS INDEX: 31.92 KG/M2 | HEIGHT: 70 IN | SYSTOLIC BLOOD PRESSURE: 142 MMHG

## 2025-01-16 DIAGNOSIS — E78.2 MIXED HYPERLIPIDEMIA: ICD-10-CM

## 2025-01-16 DIAGNOSIS — I35.0 NONRHEUMATIC AORTIC VALVE STENOSIS: ICD-10-CM

## 2025-01-16 DIAGNOSIS — I10 PRIMARY HYPERTENSION: ICD-10-CM

## 2025-01-16 DIAGNOSIS — I25.10 CHRONIC CORONARY ARTERY DISEASE: ICD-10-CM

## 2025-01-16 DIAGNOSIS — I35.0 NONRHEUMATIC AORTIC VALVE STENOSIS: Primary | ICD-10-CM

## 2025-01-16 DIAGNOSIS — I48.0 PAF (PAROXYSMAL ATRIAL FIBRILLATION): ICD-10-CM

## 2025-01-16 LAB
AORTIC DIMENSIONLESS INDEX: 0.28 (DI)
ASCENDING AORTA: 3.3 CM
AV MEAN PRESS GRAD SYS DOP V1V2: 28.5 MMHG
AV VMAX SYS DOP: 350.9 CM/SEC
BH CV ECHO MEAS - ACS: 0.63 CM
BH CV ECHO MEAS - AO MAX PG: 49.3 MMHG
BH CV ECHO MEAS - AO ROOT DIAM: 3.6 CM
BH CV ECHO MEAS - AO V2 VTI: 90 CM
BH CV ECHO MEAS - AVA(I,D): 0.84 CM2
BH CV ECHO MEAS - EDV(CUBED): 143.1 ML
BH CV ECHO MEAS - EDV(MOD-SP2): 138 ML
BH CV ECHO MEAS - EDV(MOD-SP4): 119 ML
BH CV ECHO MEAS - EF(MOD-SP2): 59.4 %
BH CV ECHO MEAS - EF(MOD-SP4): 59.7 %
BH CV ECHO MEAS - ESV(CUBED): 46.6 ML
BH CV ECHO MEAS - ESV(MOD-SP2): 56 ML
BH CV ECHO MEAS - ESV(MOD-SP4): 48 ML
BH CV ECHO MEAS - FS: 31.2 %
BH CV ECHO MEAS - IVS/LVPW: 1.06 CM
BH CV ECHO MEAS - IVSD: 1.3 CM
BH CV ECHO MEAS - LAT PEAK E' VEL: 9.4 CM/SEC
BH CV ECHO MEAS - LV DIASTOLIC VOL/BSA (35-75): 54.4 CM2
BH CV ECHO MEAS - LV MASS(C)D: 270.2 GRAMS
BH CV ECHO MEAS - LV MAX PG: 3.2 MMHG
BH CV ECHO MEAS - LV MEAN PG: 2.01 MMHG
BH CV ECHO MEAS - LV SYSTOLIC VOL/BSA (12-30): 22 CM2
BH CV ECHO MEAS - LV V1 MAX: 89.5 CM/SEC
BH CV ECHO MEAS - LV V1 VTI: 25.1 CM
BH CV ECHO MEAS - LVIDD: 5.2 CM
BH CV ECHO MEAS - LVIDS: 3.6 CM
BH CV ECHO MEAS - LVOT AREA: 3 CM2
BH CV ECHO MEAS - LVOT DIAM: 1.96 CM
BH CV ECHO MEAS - LVPWD: 1.23 CM
BH CV ECHO MEAS - MED PEAK E' VEL: 7.1 CM/SEC
BH CV ECHO MEAS - MV A DUR: 0.15 SEC
BH CV ECHO MEAS - MV A MAX VEL: 92.1 CM/SEC
BH CV ECHO MEAS - MV DEC SLOPE: 241.4 CM/SEC2
BH CV ECHO MEAS - MV DEC TIME: 0.32 SEC
BH CV ECHO MEAS - MV E MAX VEL: 81.8 CM/SEC
BH CV ECHO MEAS - MV E/A: 0.89
BH CV ECHO MEAS - MV MAX PG: 3.4 MMHG
BH CV ECHO MEAS - MV MEAN PG: 1.19 MMHG
BH CV ECHO MEAS - MV P1/2T: 102.9 MSEC
BH CV ECHO MEAS - MV V2 VTI: 33.7 CM
BH CV ECHO MEAS - MVA(P1/2T): 2.14 CM2
BH CV ECHO MEAS - MVA(VTI): 2.25 CM2
BH CV ECHO MEAS - PA ACC TIME: 0.12 SEC
BH CV ECHO MEAS - PA V2 MAX: 96.6 CM/SEC
BH CV ECHO MEAS - PULM A REVS DUR: 0.11 SEC
BH CV ECHO MEAS - PULM A REVS VEL: 28.7 CM/SEC
BH CV ECHO MEAS - PULM DIAS VEL: 32.5 CM/SEC
BH CV ECHO MEAS - PULM S/D: 1.26
BH CV ECHO MEAS - PULM SYS VEL: 40.9 CM/SEC
BH CV ECHO MEAS - QP/QS: 1.02
BH CV ECHO MEAS - RAP SYSTOLE: 3 MMHG
BH CV ECHO MEAS - RV MAX PG: 1.72 MMHG
BH CV ECHO MEAS - RV V1 MAX: 65.6 CM/SEC
BH CV ECHO MEAS - RV V1 VTI: 12.4 CM
BH CV ECHO MEAS - RVOT DIAM: 2.8 CM
BH CV ECHO MEAS - RVSP: 30 MMHG
BH CV ECHO MEAS - SUP REN AO DIAM: 2.2 CM
BH CV ECHO MEAS - SV(LVOT): 75.7 ML
BH CV ECHO MEAS - SV(MOD-SP2): 82 ML
BH CV ECHO MEAS - SV(MOD-SP4): 71 ML
BH CV ECHO MEAS - SV(RVOT): 77 ML
BH CV ECHO MEAS - SVI(LVOT): 34.6 ML/M2
BH CV ECHO MEAS - SVI(MOD-SP2): 37.5 ML/M2
BH CV ECHO MEAS - SVI(MOD-SP4): 32.5 ML/M2
BH CV ECHO MEAS - TAPSE (>1.6): 1.97 CM
BH CV ECHO MEAS - TR MAX PG: 27.4 MMHG
BH CV ECHO MEAS - TR MAX VEL: 261.8 CM/SEC
BH CV ECHO MEASUREMENTS AVERAGE E/E' RATIO: 9.92
BH CV XLRA - RV BASE: 3.4 CM
BH CV XLRA - RV LENGTH: 8.6 CM
BH CV XLRA - RV MID: 3 CM
BH CV XLRA - TDI S': 11 CM/SEC
LEFT ATRIUM VOLUME INDEX: 53.4 ML/M2
LV EF BIPLANE MOD: 59.8 %
SINUS: 3.4 CM
STJ: 2.8 CM

## 2025-01-16 PROCEDURE — 3077F SYST BP >= 140 MM HG: CPT | Performed by: FAMILY MEDICINE

## 2025-01-16 PROCEDURE — 1159F MED LIST DOCD IN RCRD: CPT | Performed by: FAMILY MEDICINE

## 2025-01-16 PROCEDURE — 99214 OFFICE O/P EST MOD 30 MIN: CPT | Performed by: FAMILY MEDICINE

## 2025-01-16 PROCEDURE — 1160F RVW MEDS BY RX/DR IN RCRD: CPT | Performed by: FAMILY MEDICINE

## 2025-01-16 PROCEDURE — 25510000001 PERFLUTREN 6.52 MG/ML SUSPENSION 2 ML VIAL: Performed by: INTERNAL MEDICINE

## 2025-01-16 PROCEDURE — 93306 TTE W/DOPPLER COMPLETE: CPT

## 2025-01-16 PROCEDURE — 3078F DIAST BP <80 MM HG: CPT | Performed by: FAMILY MEDICINE

## 2025-01-16 RX ADMIN — PERFLUTREN 2 ML: 6.52 INJECTION, SUSPENSION INTRAVENOUS at 10:05

## 2025-01-16 NOTE — PROGRESS NOTES
Date of Office Visit: 2025  Encounter Provider: ANETA Jarquin  Place of Service: [facility] CARDIOLOGY  Established cardiologist: Argentina Prieto MD  Patient Name: Spencer Sinha  :1950      Patient ID:  Spencer Sinha is a 74 y.o. male is here for  followup    With a pertinent medical history of CAD status post CABG , lung cancer status post right lower lobe lobectomy in , hypertension, hyperlipidemia, diabetes mellitus, paroxysmal atrial fibrillation, peripheral artery disease.    History of Present Illness  Admitted to [facility] 2011 with syncope and A-fib with RVR.  This was due to thyrotoxicosis.  He converted to sinus rhythm with medicine and was stable discharged on propylthiouracil.    Bilateral knee replacements .  Prior to this had abnormal stress study showing anterior infarct with jeffry-infarct ischemia went on to have LHC which showed severe LAD disease that was well collateralized and he was treated medically as he had no active angina.     Has followed with Dr. Solo for PAD underwent aortobifemoral bypass in .  Follows with Dr. Sampson with discoid lupus and Dr. Foster.  He quit smoking in .    CT scan 2018 showed extensive coronary artery calcification and he had mild exertional dyspnea.  Nuclear stress at that time showed medium sized area of moderate to severe ischemia in the inferior apical distribution.  LHC 2019 showed a normal left main, occluded LAD with left to left collaterals, 50% proximal and mid circumflex stenosis, 90% ostial RCA stenosis, 90% proximal distal RCA stenosis.  2019 he underwent three-vessel CABG receiving LIMA to LAD, SVG to OM, and SVG to PDA with Dr. Crawford.  Carotid duplex 2019 showed bilateral ICA plaquing with no significant stenosis.  Brief episode of postop A-fib and converted to normal sinus.  He was given amiodarone for short duration.    He was diagnosed  with stage III moderately differentiated mucinous adenocarcinoma and had right lower lobectomy in 2019.  He also had lymph node dissection.    2022 he had a nonischemic stress nuclear study.    He fell off a stepladder September 2022 and had a severe concussion followed by neurology at [facility].  He was hospitalized from that.  During this an echo was done which showed EF 60 to 65%, G1 DD, mild aortic stenosis.  This is actually the third time he has fallen off a ladder.    MISA done 8/24/2023 showed normal right lower extremity, normal left lower extremity.  Carotid duplex less than 50% bilateral ICA stenosis with normal flow in the bilateral vertebral arteries.    He last saw Dr. Prieto in January 2024.  He was stable with recommendations for echocardiogram in 1 year.    March 2024 he was seen in the [facility] emergency department for dizziness, he had had 4 teeth pulled the day prior.  In an episode of dizziness that was noted at home blood pressure recording showed a systolic of 68.  In the emergency department his blood pressure was 146/68.  He was suspected to have had a hypoglycemic event.  A CT head showed no acute intracranial findings.  Carotid duplex done at this time showed bilateral ICA with plaque and less than 50% stenosis which was unchanged from prior study.  MRI brain showed no acute abnormality.  MRI cervical spine showed congenitally narrowed cervical canal with moderate multilevel spondylosis resulting in moderate severity canal stenosis from C3-C7.This was favoring chronic myelomalacia.  He had multilevel bilateral high-grade foraminal stenosis in the same distribution.He was deemed stable for discharge    He completed an echocardiogram 7/9/2024 that showed an ejection fraction of 57.8%, moderate LVH, with wall motion abnormalities, apical septal, apical inferior, and apex were hypokinetic.  All other wall segments were normal.  Moderately calcified aortic valve, moderate aortic stenosis with a  maximum pressure gradient of 33.4 mmHg and a mean pressure gradient of 19.2 mmHg.  Moderate mitral annular calcification with trace mitral regurgitation.  Trace tricuspid regurgitation normal RVSP.    August 2024 he underwent cervical laminectomy and fusion of C3-C7.    September 2024 he suffered a fall and eyebrow laceration He was seen in the [place] emergency department for this.  The fall occurred after he was in the bathroom and he had stood up and lost balance. Denies presyncope, dizziness, or lightheadedness.     Today 1/16/25 Spencer completed an echocardiogram prior to this appointment.  This shows an ejection fraction of 59.8%, mild to moderate LVH, moderately calcified aortic valve, moderate aortic stenosis with a maximum pressure gradient of 49.3 mmHg and a mean pressure gradient of 28.5 mmHg.  Mild MAC.  Mild TR.  Normal RVSP.  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  Now he is a very active .  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  He keeps files on his phone if different plants in the instructions to care for them and is very educated on this. he also works out at the gym often and he has a .  He denies exertional difficulty or complaint except for mild dyspnea when he does the elliptical, that has been stable and unchanged.  There is no chest pain or pressure.  He has not had any recurrent syncope.  No heart racing or palpitations.  No leg swelling.  No shortness of breath or PND.      Current Outpatient Medications on File Prior to Visit   Medication Sig Dispense Refill    albuterol sulfate  (90 Base) MCG/ACT inhaler       aspirin 81 MG EC tablet Take 1 tablet by mouth Daily. HOLDING PER MD      Baclofen 5 MG tablet Take 1 tablet by mouth.      clopidogrel (PLAVIX) 75 MG tablet Take 1 tablet by mouth Daily. HOLDING PER MD      DULoxetine (CYMBALTA) 60 MG capsule Take 1 capsule by mouth Every  Evening.      Ferrous Sulfate (IRON PO) Take 1 tablet by mouth Daily.      Fiasp FlexTouch 100 UNIT/ML solution pen-injector       fluticasone (FLONASE) 50 MCG/ACT nasal spray       gabapentin (NEURONTIN) 600 MG tablet Take 1 tablet by mouth 3 (Three) Times a Day.      glimepiride (AMARYL) 4 MG tablet       glucose blood test strip Kimberly Contour Test In Vitro Strip; Patient Sig: Kimberly Contour Test In Vitro Strip ; 100; 0; 06-May-2012; Active      Insulin Pen Needle 32G X 6 MM misc       levothyroxine (SYNTHROID, LEVOTHROID) 175 MCG tablet Take 1 tablet by mouth Daily. 30 tablet 0    losartan (COZAAR) 25 MG tablet Take 1 tablet by mouth Every Evening. 90 tablet 3    metFORMIN (GLUCOPHAGE) 1000 MG tablet TAKE ONE TABLET BY MOUTH TWO TIMES A  tablet 0    rOPINIRole (REQUIP) 2 MG tablet Take 1 tablet by mouth 3 (Three) Times a Day.      rosuvastatin (CRESTOR) 40 MG tablet       Trelegy Ellipta 100-62.5-25 MCG/ACT inhaler       vitamin C (ASCORBIC ACID) 250 MG tablet Take 4 tablets by mouth Daily.      Zinc 30 MG capsule       [DISCONTINUED] atorvastatin (LIPITOR) 40 MG tablet TAKE ONE TABLET BY MOUTH DAILY 90 tablet 0    [DISCONTINUED] Cholecalciferol (VITAMIN D3) 2000 units tablet Take 1 tablet by mouth Daily.      [DISCONTINUED] Cyanocobalamin (VITAMIN B-12 PO) Take 1 tablet by mouth Daily.      [DISCONTINUED] Insulin Glargine (BASAGLAR KWIKPEN) 100 UNIT/ML injection pen Inject 20 Units under the skin into the appropriate area as directed Daily.       No current facility-administered medications on file prior to visit.         Procedures    ECG 12 Lead    Date/Time: 1/17/2025 1:02 PM  Performed by: Gill Muniz APRN    Authorized by: Gill Muniz APRN  Comparison: compared with previous ECG from 1/16/2024  Rhythm: sinus rhythm  BPM: 53  Conduction: non-specific intraventricular conduction delay  T flattening: II, III, aVF, V1, V2, V3, V4, V5 and V6    Clinical impression: non-specific  "ECG              Objective:      Vitals:    01/16/25 1046   BP: 140/70   Pulse: 53   SpO2: 100%   Weight: 95.7 kg (211 lb)   Height: 177.8 cm (70\")     Body mass index is 30.28 kg/m².  Wt Readings from Last 3 Encounters:   01/16/25 101 kg (223 lb)   01/16/25 95.7 kg (211 lb)   07/09/24 101 kg (223 lb)         Constitutional:       Appearance: Healthy appearance. Not in distress.   Eyes:      Pupils: Pupils are equal, round, and reactive to light.   Pulmonary:      Effort: Pulmonary effort is normal.      Breath sounds: Normal breath sounds.   Cardiovascular:      Normal rate. Regular rhythm.      Murmurs: There is a grade 2to 3/6 systolic murmur.   Pulses:     Radial: 2+ bilaterally.     Dorsalis pedis: 2+ bilaterally.     Posterior tibial: 2+ bilaterally.  Edema:     Peripheral edema absent.   Musculoskeletal:      Cervical back: Normal range of motion. Skin:     General: Skin is warm and dry.   Neurological:      Mental Status: Alert and oriented to person, place and time.       Lab Review:      Lab Results   Component Value Date    TSH 0.531 06/30/2021       Lab Results   Component Value Date    CHOL 132 02/07/2019    CHLPL 162 06/12/2020    CHLPL 129 01/08/2019    CHLPL 127 10/08/2018     Lab Results   Component Value Date    TRIG 135 06/12/2020    TRIG 193 (H) 02/07/2019    TRIG 163 (H) 01/08/2019     Lab Results   Component Value Date    HDL 42 06/12/2020    HDL 37 (L) 02/07/2019    HDL 36 (L) 01/08/2019     Lab Results   Component Value Date    LDL 93 06/12/2020    LDL 56 02/07/2019    LDL 60 01/08/2019       Lab Results   Component Value Date    WBC 8.17 09/08/2024    HGB 12.3 (L) 09/08/2024    HCT 38.4 (L) 09/08/2024    MCV 84.6 09/08/2024     09/08/2024       Lab Results   Component Value Date    GLUCOSE 177 (H) 06/12/2020    BUN 17 02/06/2023    CREATININE 1.15 02/06/2023    BCR 14.8 02/06/2023    K 3.9 02/06/2023    CO2 26 02/06/2023    CALCIUM 9.5 02/06/2023    PROTENTOTREF 7.0 06/12/2020    " ALBUMIN 4.3 02/06/2023    BILITOT 0.5 02/06/2023    AST 19 02/06/2023    ALT 23 02/06/2023       Lab Results   Component Value Date    HGBA1C 8.4 (H) 08/19/2024       Assessment:     1. Nonrheumatic aortic valve stenosis    2. Chronic coronary artery disease    3. Primary hypertension    4. Mixed hyperlipidemia    5. PAF (paroxysmal atrial fibrillation)    Moderate aortic stenosis, mean pressure gradient of 29 mmHg.  On TTE done today 1/16/2025  CAD status post CABG in 2019.  Nonischemic stress nuclear study 7/2022.  He continues on aspirin, clopidogrel, ARB, and statin.  He has no angina.  His blood pressure is well-controlled on my recheck today 120/72.  Hyperlipidemia on rosuvastatin.  He did not tolerate atorvastatin with myalgia.  History of atrial fibrillation due to thyroid toxicosis.  He has had no known recurrence of this.  Diabetes mellitus type 2, per Dr. Browne. Well controlled.  He had swelling and weight gain with insulin glargine and this had to be discontinued.  Peripheral arterial disease status post bilateral aorta with bifemoral bypasses. Follows with Dr. Gisela Sampson annually and everything has been stable.  Carotid duplex study done 8/2023 showed less than 50% bilateral carotid artery stenosis  History of tobacco use. He stopped smoking in 2015.   Right lung cancer with right lower lobe lobectomy done 5/2019, follows with Dr. Renee.   History of thyrotoxicosis, status post ablation    Plan:   No medication changes were made  Echocardiogram in 1 year  Overall he is stable and feeling well.  Return in about 1 year (around 1/16/2026) for Dr. Prieto.        Thank you for allowing me to participate in this patient's care. Please call with any questions or concerns.          Dragon dictation device was utilized in this note.

## 2025-01-17 PROCEDURE — 93000 ELECTROCARDIOGRAM COMPLETE: CPT | Performed by: FAMILY MEDICINE

## (undated) DEVICE — Device

## (undated) DEVICE — OPTIFOAM GENTLE SA, POSTOP, 4X12: Brand: MEDLINE

## (undated) DEVICE — GLV SURG BIOGEL LTX PF 7

## (undated) DEVICE — SYS PERFUS SEP PLATLT W TIPS CUST

## (undated) DEVICE — GLV SURG BIOGEL LTX PF 6 1/2

## (undated) DEVICE — 8 FOOT DISPOSABLE EXTENSION CABLE WITH SAFE CONNECT / ALLIGATOR CLIP

## (undated) DEVICE — CLAMP INSERT: Brand: STEALTH® CLAMP INSERT

## (undated) DEVICE — SOL IRR RNG BTL 1000ML

## (undated) DEVICE — TEMP PACING WIRE: Brand: MYO/WIRE

## (undated) DEVICE — ST TOURNI COMPL A/ 7IN

## (undated) DEVICE — SPNG GZ WOVN 4X4IN 12PLY 10/BX STRL

## (undated) DEVICE — ST. SORBAVIEW ULTIMATE IJ SYSTEM A,C: Brand: CENTURION

## (undated) DEVICE — GLV SURG BIOGEL LTX PF 7 1/2

## (undated) DEVICE — DRP SLUSH MACH FOR STND ALONE OM-ORS-321

## (undated) DEVICE — ROTATING SURGICAL PUNCHES, 1 PER POUCH: Brand: A&E MEDICAL / ROTATING SURGICAL PUNCHES

## (undated) DEVICE — HEMOCONCENTRATOR PERFUS LPS06

## (undated) DEVICE — INSUFFLATION TUBING,LAPAROSCOPIC: Brand: DEROYAL

## (undated) DEVICE — KT MANIFLD CARDIAC

## (undated) DEVICE — CORONARY ARTERY BYPASS GRAFT MARKERS, STAINLESS STEEL, DISTAL, WITHOUT HOLDER: Brand: ANASTOMARK CORONARY ARTERY BYPASS GRAFT MARKERS, STAINLESS STEEL, DISTAL

## (undated) DEVICE — AIRLIFE™ HCH HYGROSCOPIC CONDENSER HUMIDFIER: Brand: AIRLIFE™

## (undated) DEVICE — ADAPT ANTEGRADE RETRGR

## (undated) DEVICE — MARKR SKIN W/RULR AND LBL

## (undated) DEVICE — TOWEL,OR,DSP,ST,BLUE,STD,4/PK,20PK/CS: Brand: MEDLINE

## (undated) DEVICE — CANN AORT ROOT DLP VNT 14G 7F

## (undated) DEVICE — BLOWER/MISTER AXIOUS OPCAB W/TBG

## (undated) DEVICE — SOL ISO/ALC RUB 70PCT 4OZ

## (undated) DEVICE — 28 FR STRAIGHT – SOFT PVC CATHETER: Brand: PVC THORACIC CATHETERS

## (undated) DEVICE — BIOPATCH™ ANTIMICROBIAL DRESSING WITH CHLORHEXIDINE GLUCONATE IS A HYDROPHILLIC POLYURETHANE ABSORPTIVE FOAM WITH CHLORHEXIDINE GLUCONATE (CHG) WHICH INHIBITS BACTERIAL GROWTH UNDER THE DRESSING. THE DRESSING IS INTENDED TO BE USED TO ABSORB EXUDATE, COVER A WOUND CAUSED BY VASCULAR AND NONVASCULAR PERCUTANEOUS MEDICAL DEVICES DURING SURGERY, AS WELL AS REDUCE LOCAL INFECTION AND COLONIZATION OF MICROORGANISMS.: Brand: BIOPATCH

## (undated) DEVICE — DRSNG WND BORDR/ADHS NONADHR/GZ LF 4X10IN STRL

## (undated) DEVICE — CANN IRR VEN BVL TP

## (undated) DEVICE — DRSNG WND BORDR/ADHS NONADHR/GZ LF 4X14IN STRL

## (undated) DEVICE — PK PERFUS CUST W/CARDIOPLEGIA

## (undated) DEVICE — PK ATS CUST W CARDIOTOMY RESEVOIR

## (undated) DEVICE — 3M™ MEDIPORE™ H SOFT CLOTH SURGICAL TAPE 2862, 2 INCH X 10 YARD (5CM X 9,1M), 12 ROLLS/CASE: Brand: 3M™ MEDIPORE™

## (undated) DEVICE — CANN VESL FREE FLO 2MM

## (undated) DEVICE — SENSR CERBRL O2 PK/2

## (undated) DEVICE — DRSNG SURESITE WNDW 2.38X2.75

## (undated) DEVICE — SOL IRR NACL 0.9PCT BT 1000ML

## (undated) DEVICE — OASIS DRAIN, SINGLE, INLINE & ATS COMPATIBLE: Brand: OASIS

## (undated) DEVICE — PK CATH CARD 40

## (undated) DEVICE — CANN ART EOPA 3D NV W/CONN 20F

## (undated) DEVICE — CATH VENT MIV RADL PIG ST TIP 5F 110CM

## (undated) DEVICE — GW EMR FIX EXCHG J STD .035 3MM 260CM

## (undated) DEVICE — SOL NACL 0.9PCT 1000ML

## (undated) DEVICE — PK HEART OPN 40

## (undated) DEVICE — BNDG ELAS ELITE V/CLOSE 3IN 5YD LF STRL

## (undated) DEVICE — SOL BETADINE 4OZ

## (undated) DEVICE — BNDG ELAS ELITE V/CLOSE 6IN 5YD LF STRL

## (undated) DEVICE — SPNG DISECTOR KTNER XRAY COTN 1/4X9/16IN PK/5

## (undated) DEVICE — GLIDESHEATH BASIC HYDROPHILIC COATED INTRODUCER SHEATH: Brand: GLIDESHEATH

## (undated) DEVICE — VASOVIEW VV6 PRO: Brand: VASOVIEW VV6 PRO

## (undated) DEVICE — CATH DIAG IMPULSE FL3.5 5F 100CM

## (undated) DEVICE — BNDG ELAS ELITE V/CLOSE 4IN 5YD LF STRL